# Patient Record
Sex: FEMALE
[De-identification: names, ages, dates, MRNs, and addresses within clinical notes are randomized per-mention and may not be internally consistent; named-entity substitution may affect disease eponyms.]

---

## 2019-04-02 ENCOUNTER — HOSPITAL ENCOUNTER (OUTPATIENT)
Dept: HOSPITAL 92 - BICMRI | Age: 69
Discharge: HOME | End: 2019-04-02
Attending: ANESTHESIOLOGY
Payer: MEDICARE

## 2019-04-02 DIAGNOSIS — M48.02: ICD-10-CM

## 2019-04-02 DIAGNOSIS — M54.12: Primary | ICD-10-CM

## 2019-04-02 PROCEDURE — 72141 MRI NECK SPINE W/O DYE: CPT

## 2019-04-02 NOTE — MRI
FExam: MRI cervical spine without contrast



HISTORY: Cervical radicular pain. Intermittent neck pain x2-3 years..



COMPARISON: None



FINDINGS:

 Limited evaluation due to motion dictation multiple sequences

Appropriate T1 marrow signal intensity of the cervical vertebra. Vertebral body height is maintained.
 No fracture.

No significant STIR hyperintensity to suggest vertebral body edema or ligamentous injury

Visualized brain parenchyma, cervical junction, cervical cord and upper thoracic cord have a normal s
ize and signal intensity





C2-C3: No significant central canal stenosis. Moderate right and moderate-to-severe left foraminal na
rrowing.



C2-C4: No significant central canal stenosis. Mild right and moderate to severe left foraminal narrow
ing.



C4-C5: No significant central canal stenosis. Moderate right and moderate to severe left foraminal na
rrowing. There is left facet hypertrophy.



C5-C6: No high-grade central canal stenosis. Moderate bilateral foraminal narrowing. Mild to moderate
 left facet hypertrophy.



C6-C7: No high-grade central canal stenosis. Mild bilateral foraminal narrowing.



C7-T1: Suboptimal evaluation. Based on sagittal images, no high-grade stenosis



IMPRESSION:

 Limited evaluation due to motion examination. No high-grade central canal stenosis. Varying degrees 
of foraminal narrowing as detailed above.



Reported By: Wendy Street 

Electronically Signed:  4/2/2019 10:00 AM

## 2019-06-27 ENCOUNTER — HOSPITAL ENCOUNTER (INPATIENT)
Dept: HOSPITAL 92 - ERS | Age: 69
LOS: 6 days | Discharge: HOME HEALTH | DRG: 377 | End: 2019-07-03
Attending: FAMILY MEDICINE | Admitting: FAMILY MEDICINE
Payer: MEDICARE

## 2019-06-27 VITALS — BODY MASS INDEX: 43.4 KG/M2

## 2019-06-27 DIAGNOSIS — K57.10: ICD-10-CM

## 2019-06-27 DIAGNOSIS — J45.901: ICD-10-CM

## 2019-06-27 DIAGNOSIS — J44.9: ICD-10-CM

## 2019-06-27 DIAGNOSIS — D63.1: ICD-10-CM

## 2019-06-27 DIAGNOSIS — I13.0: ICD-10-CM

## 2019-06-27 DIAGNOSIS — K26.4: Primary | ICD-10-CM

## 2019-06-27 DIAGNOSIS — Z79.51: ICD-10-CM

## 2019-06-27 DIAGNOSIS — F32.9: ICD-10-CM

## 2019-06-27 DIAGNOSIS — F03.90: ICD-10-CM

## 2019-06-27 DIAGNOSIS — I50.33: ICD-10-CM

## 2019-06-27 DIAGNOSIS — Z96.652: ICD-10-CM

## 2019-06-27 DIAGNOSIS — N18.3: ICD-10-CM

## 2019-06-27 DIAGNOSIS — E11.22: ICD-10-CM

## 2019-06-27 DIAGNOSIS — E78.5: ICD-10-CM

## 2019-06-27 DIAGNOSIS — E03.9: ICD-10-CM

## 2019-06-27 DIAGNOSIS — K63.5: ICD-10-CM

## 2019-06-27 DIAGNOSIS — M54.12: ICD-10-CM

## 2019-06-27 DIAGNOSIS — G47.33: ICD-10-CM

## 2019-06-27 DIAGNOSIS — N17.9: ICD-10-CM

## 2019-06-27 DIAGNOSIS — Z79.84: ICD-10-CM

## 2019-06-27 DIAGNOSIS — E66.01: ICD-10-CM

## 2019-06-27 DIAGNOSIS — Z79.899: ICD-10-CM

## 2019-06-27 LAB
ALBUMIN SERPL BCG-MCNC: 3.3 G/DL (ref 3.4–4.8)
ALP SERPL-CCNC: 272 U/L (ref 40–150)
ALT SERPL W P-5'-P-CCNC: 20 U/L (ref 8–55)
ANALYZER IN CARDIO: (no result)
ANION GAP SERPL CALC-SCNC: 15 MMOL/L (ref 10–20)
AST SERPL-CCNC: 22 U/L (ref 5–34)
BASE EXCESS STD BLDA CALC-SCNC: 3.1 MEQ/L
BASOPHILS # BLD AUTO: 0 THOU/UL (ref 0–0.2)
BASOPHILS NFR BLD AUTO: 0.1 % (ref 0–1)
BILIRUB SERPL-MCNC: 0.4 MG/DL (ref 0.2–1.2)
BUN SERPL-MCNC: 52 MG/DL (ref 9.8–20.1)
CA-I BLDA-SCNC: 1.19 MMOL/L (ref 1.12–1.3)
CALCIUM SERPL-MCNC: 9.3 MG/DL (ref 7.8–10.44)
CHLORIDE SERPL-SCNC: 101 MMOL/L (ref 98–107)
CK MB SERPL-MCNC: 3.5 NG/ML (ref 0–6.6)
CK SERPL-CCNC: 103 U/L (ref 29–168)
CO2 SERPL-SCNC: 28 MMOL/L (ref 23–31)
CREAT CL PREDICTED SERPL C-G-VRATE: 0 ML/MIN (ref 70–130)
EOSINOPHIL # BLD AUTO: 0.1 THOU/UL (ref 0–0.7)
EOSINOPHIL NFR BLD AUTO: 1 % (ref 0–10)
GLOBULIN SER CALC-MCNC: 3.4 G/DL (ref 2.4–3.5)
GLUCOSE SERPL-MCNC: 81 MG/DL (ref 80–115)
HCO3 BLDA-SCNC: 28.2 MEQ/L (ref 22–28)
HCT VFR BLDA CALC: 24 % (ref 36–47)
HGB BLD-MCNC: 7.6 G/DL (ref 12–16)
HGB BLD-MCNC: 7.6 G/DL (ref 12–16)
HGB BLDA-MCNC: 8.1 G/DL (ref 12–16)
IRON SERPL-MCNC: 78 UG/DL (ref 50–170)
LIPASE SERPL-CCNC: 37 U/L (ref 8–78)
LYMPHOCYTES # BLD: 1.8 THOU/UL (ref 1.2–3.4)
LYMPHOCYTES NFR BLD AUTO: 14 % (ref 21–51)
MCH RBC QN AUTO: 32.1 PG (ref 27–31)
MCV RBC AUTO: 95.4 FL (ref 78–98)
MONOCYTES # BLD AUTO: 0.6 THOU/UL (ref 0.11–0.59)
MONOCYTES NFR BLD AUTO: 4.5 % (ref 0–10)
NEUTROPHILS # BLD AUTO: 10.2 THOU/UL (ref 1.4–6.5)
NEUTROPHILS NFR BLD AUTO: 80.4 % (ref 42–75)
PCO2 BLDA: 45.8 MMHG (ref 35–45)
PH BLDA: 7.41 [PH] (ref 7.35–7.45)
PLATELET # BLD AUTO: 237 THOU/UL (ref 130–400)
PO2 BLDA: 65.7 MMHG (ref 80–?)
POTASSIUM BLD-SCNC: 4.04 MMOL/L (ref 3.7–5.3)
POTASSIUM SERPL-SCNC: 4 MMOL/L (ref 3.5–5.1)
RBC # BLD AUTO: 2.37 MILL/UL (ref 4.2–5.4)
RETICS/RBC NFR: 3.8 % (ref 0.5–1.5)
SODIUM SERPL-SCNC: 140 MMOL/L (ref 136–145)
SPECIMEN DRAWN FROM PATIENT: (no result)
TROPONIN I SERPL DL<=0.01 NG/ML-MCNC: (no result) NG/ML (ref ?–0.03)
TROPONIN I SERPL DL<=0.01 NG/ML-MCNC: (no result) NG/ML (ref ?–0.03)
UIBC SERPL-MCNC: 293 MCG/DL (ref 265–497)
VIT B12 SERPL-MCNC: 745 PG/ML (ref 211–911)
WBC # BLD AUTO: 12.7 THOU/UL (ref 4.8–10.8)

## 2019-06-27 PROCEDURE — 85014 HEMATOCRIT: CPT

## 2019-06-27 PROCEDURE — P9016 RBC LEUKOCYTES REDUCED: HCPCS

## 2019-06-27 PROCEDURE — 85046 RETICYTE/HGB CONCENTRATE: CPT

## 2019-06-27 PROCEDURE — 83550 IRON BINDING TEST: CPT

## 2019-06-27 PROCEDURE — 84484 ASSAY OF TROPONIN QUANT: CPT

## 2019-06-27 PROCEDURE — 93306 TTE W/DOPPLER COMPLETE: CPT

## 2019-06-27 PROCEDURE — 71045 X-RAY EXAM CHEST 1 VIEW: CPT

## 2019-06-27 PROCEDURE — 83540 ASSAY OF IRON: CPT

## 2019-06-27 PROCEDURE — 82274 ASSAY TEST FOR BLOOD FECAL: CPT

## 2019-06-27 PROCEDURE — 86850 RBC ANTIBODY SCREEN: CPT

## 2019-06-27 PROCEDURE — 80069 RENAL FUNCTION PANEL: CPT

## 2019-06-27 PROCEDURE — C9113 INJ PANTOPRAZOLE SODIUM, VIA: HCPCS

## 2019-06-27 PROCEDURE — 82553 CREATINE MB FRACTION: CPT

## 2019-06-27 PROCEDURE — 36430 TRANSFUSION BLD/BLD COMPNT: CPT

## 2019-06-27 PROCEDURE — 82746 ASSAY OF FOLIC ACID SERUM: CPT

## 2019-06-27 PROCEDURE — 83036 HEMOGLOBIN GLYCOSYLATED A1C: CPT

## 2019-06-27 PROCEDURE — 96361 HYDRATE IV INFUSION ADD-ON: CPT

## 2019-06-27 PROCEDURE — 86900 BLOOD TYPING SEROLOGIC ABO: CPT

## 2019-06-27 PROCEDURE — 94640 AIRWAY INHALATION TREATMENT: CPT

## 2019-06-27 PROCEDURE — 30233N1 TRANSFUSION OF NONAUTOLOGOUS RED BLOOD CELLS INTO PERIPHERAL VEIN, PERCUTANEOUS APPROACH: ICD-10-PCS | Performed by: INTERNAL MEDICINE

## 2019-06-27 PROCEDURE — 80048 BASIC METABOLIC PNL TOTAL CA: CPT

## 2019-06-27 PROCEDURE — 82550 ASSAY OF CK (CPK): CPT

## 2019-06-27 PROCEDURE — 83880 ASSAY OF NATRIURETIC PEPTIDE: CPT

## 2019-06-27 PROCEDURE — 96374 THER/PROPH/DIAG INJ IV PUSH: CPT

## 2019-06-27 PROCEDURE — 93005 ELECTROCARDIOGRAM TRACING: CPT

## 2019-06-27 PROCEDURE — 96375 TX/PRO/DX INJ NEW DRUG ADDON: CPT

## 2019-06-27 PROCEDURE — 82728 ASSAY OF FERRITIN: CPT

## 2019-06-27 PROCEDURE — 85018 HEMOGLOBIN: CPT

## 2019-06-27 PROCEDURE — 36415 COLL VENOUS BLD VENIPUNCTURE: CPT

## 2019-06-27 PROCEDURE — 36416 COLLJ CAPILLARY BLOOD SPEC: CPT

## 2019-06-27 PROCEDURE — 82607 VITAMIN B-12: CPT

## 2019-06-27 PROCEDURE — 83690 ASSAY OF LIPASE: CPT

## 2019-06-27 PROCEDURE — 86901 BLOOD TYPING SEROLOGIC RH(D): CPT

## 2019-06-27 PROCEDURE — 88305 TISSUE EXAM BY PATHOLOGIST: CPT

## 2019-06-27 PROCEDURE — 80053 COMPREHEN METABOLIC PANEL: CPT

## 2019-06-27 PROCEDURE — 82805 BLOOD GASES W/O2 SATURATION: CPT

## 2019-06-27 PROCEDURE — 85025 COMPLETE CBC W/AUTO DIFF WBC: CPT

## 2019-06-27 NOTE — RAD
EXAM: Single view of the chest



HISTORY:   Altered mental status



COMPARISON: 9/23/2016



FINDINGS: Single view of the chest shows a normal sized cardiomediastinal silhouette.  Stable opacity
 is seen in the right apex medially from the patient's aortic arch abnormality.  There is no

evidence of consolidation, mass, or pleural effusion. The bones are unremarkable.



IMPRESSION: No evidence of acute cardiopulmonary disease



Reported By: Tam Oliva 

Electronically Signed:  6/27/2019 3:25 PM

## 2019-06-27 NOTE — CON
DATE OF CONSULTATION:  06/27/2019



CHIEF COMPLAINT:  Weakness.



HISTORY OF PRESENT ILLNESS:  Ms. Gomez is a 69-year-old woman who presented to

the emergency room today after her friends found her to be weak and short of breath

this morning.  She has no chest pain.  She has had a pressure type pain in the left

upper quadrant over the last several days.  She has had no visible blood in the

stool, black stools or red stools.  She has had no diarrhea or constipation.  No

change in her appetite or weight.  She was found to have severe anemia in the

emergency room and she has been started on blood transfusion.  GI was consulted to

evaluate her anemia. 



PAST MEDICAL HISTORY:  She has a history of a malignant colon polyp resected in the

distant past.  Her last colonoscopy was in 2015 by Dr. Samuel and was normal except

for hemorrhoids.  She has diabetes mellitus and hypertension, chronic kidney

disease, dementia, hyperlipidemia, hypothyroidism, obesity. 



PAST SURGICAL HISTORY:  Left total knee replacement, multiple endoscopies.



FAMILY HISTORY:  Positive for colon cancer in her mother.



SOCIAL HISTORY:  No alcohol, tobacco, or drugs currently.



ALLERGIES:  NO KNOWN DRUG ALLERGIES.



MEDICATIONS:  At home prior to admission:

1. Atorvastatin.

2. Namenda.

3. Calcitriol.

4. Donepezil. 

5. Gabapentin.

6. Amitriptyline.

7. Levothyroxine.

8. Glipizide.

9. Tramadol.

10. Lisinopril.

11. Ventolin inhaler.

12. Acetaminophen with codeine.



REVIEW OF SYSTEMS:  Negative x10 systems reviewed except as stated in the history of

present illness. 



PHYSICAL EXAMINATION:

VITAL SIGNS:  Temperature 98.1, pulse 88, blood pressure 156/76. 

GENERAL:  She is in no acute distress.  Awake and alert.   

HEENT:  Eyes have no scleral icterus.  Oropharynx is clear without lesions.  No

cervical or supraclavicular lymphadenopathy. LUNGS:  Clear to auscultation

bilaterally. 

HEART:  Regular rate and rhythm without murmur. 

ABDOMEN:  Soft, nontender, and nondistended.  Bowel sounds are present.  No

hepatomegaly. EXTREMITIES:  No lower extremity edema. 

NEUROLOGIC:  Cranial nerves are grossly intact.  She had a Hemoccult in the

emergency room, which was negative for occult blood. 



IMPRESSION:  

1. Normocytic anemia.  Her hemoglobin is 7.6 with an MCV of 95.  This is a

significant drop from 04/16/2019 when her hemoglobin was 11.5.  She has had no overt

gastrointestinal bleeding associated with this and she is Hemoccult negative.

Source of the anemia is not determined at this point. 

2. History of colon cancer status post removal of malignant polyp.  Last colonoscopy

in 2015 was normal except for internal hemorrhoids. 

3. Left upper quadrant pressure type abdominal pain.  This appears to be mild

overall. 



RECOMMENDATIONS:  

1. Check iron studies, B12 and folate.  Check reticulocyte count.

2. We will follow through with the EGD and colonoscopy for tomorrow.

3. She is receiving transfusion today.

4. Continue to follow the trend of her creatinine and hemoglobin.







Job ID:  [id number]

## 2019-06-27 NOTE — HP
REASON FOR ADMISSION:  GI bleed, asthma exacerbation, and acute kidney injury.



HISTORY OF PRESENTING ILLNESS:  The patient gives history of feeling short of 
breath

and feeling weak from this morning.  She called her friend and neighbor who

essentially brought her to emergency room in a car.  She lives at [facility] from last 5 years.  She says she ambulates with a rolling walker.  No

obvious bleeding from her rectum that she has noticed.  No black stools.  No 
history

of hematemesis.  She says she fell 2 weeks back and hit her right knee with

bruising.  The patient also mentions that she has a followup appointment for

colonoscopy on  with Dr. Alba Samuel.  She has no complaints of chest 
pain or

palpitation.  No complaints of altered phlegm, but has some dry coughing spells 
due

to asthma.  She uses 2L oxygen at bedtime.  She also uses nebulizers twice 
daily.

Has no complaints of palpitations or fever. 



PAST MEDICAL AND SURGICAL HISTORY:  

1. History of dementia.

2. Chronic kidney disease.

3. Diabetes mellitus, type 2.

4. Dyslipidemia.

5. Hypertension.

6. Hypothyroidism.

7. Cervical radiculopathy and has a followup appointment for an MRI on  at

[facility]. 

8. Left total knee replacement.

9. Depression.

10. Obesity.



CURRENT MEDICATIONS:  The patient is on;

1. Atorvastatin 40 mg p.o. daily.

2. Namenda extended release 14 mg p.o. daily.

3. Calcitriol 0.5 mg p.o. daily.

4. Donepezil 5 mg p.o. nightly.

5. Gabapentin 300 mg p.o. twice daily.

6. Amitriptyline 25 mg p.o. daily.

7. Levothyroxine 100 mcg p.o. daily.

8. Glipizide 5 mg p.o. daily.

9. Tramadol 50 mg p.o. twice daily.

10. Lisinopril 2.5 mg p.o. daily.

11. Ventolin inhaler p.r.n.

12. Ventolin nebulizer twice daily.

13. The patient mentions that she forgets to take her aspirin, which she is not 
sure

when she last took. 



ALLERGIES:  NO KNOWN DRUG ALLERGIES.  SHE GETS A RASH WITH TAPE.



PERSONAL HISTORY:  Does not abuse alcohol or drugs.  No history of smoking.



FAMILY HISTORY:  Both parents  in their 80s from unknown cancer.  She cannot

recollect the exact nature of death they had. 



CODE STATUS:  Full.  Power of  is her son, Mr. Salty Gomez.
  She

normally ambulates with a walker for short distances.  She lives at [facility] from last 5 years. 



REVIEW OF SYSTEMS:  CONSTITUTIONAL:  Negative for weight loss or gain, ability 
to

conduct usual activities. 

SKIN:  Negative for rash, itching. 

EYES:  Negative for double vision, pain. 

ENT/MOUTH:  Negative for nose bleeding, neck stiffness, pain, tenderness. 

CARDIOVASCULAR:  Negative for palpitations, dyspnea on exertion, orthopnea. 

RESPIRATORY:  Negative for shortness of breath, wheezing, cough, hemoptysis, 
fever

or night sweats. 

GASTROINTESTINAL:  Negative for poor appetite, abdominal pain, heartburn, nausea
,

vomiting, constipation, or diarrhea. 

GENITOURINARY:  Negative for urgency, frequency, dysuria, nocturia. 

MUSCULOSKELETAL:  Negative for pain, swelling. 

NEUROLOGIC/PSYCHIATRIC:  Negative for anxiety, depression. 

ALLERGY/IMMUNOLOGIC:  Negative for skin rash, bleeding tendency.



PHYSICAL EXAMINATION:

GENERAL:  The patient is a 69-year-old female, who is currently not in any acute

distress. 

VITAL SIGNS:  Blood pressure 156/76, pulse 88 per minute, respiratory rate 22 
per

minute, temperature 98.1 degrees Fahrenheit, saturating 96% on 2L nasal 
cannula. 

NECK:  Supple.  No elevated JVP. 

EYES:  Extraocular muscles intact.  Pupils reacting to light. 

ORAL CAVITY:  Mucous membranes are dry.  No exudates or congestion. 

CARDIOVASCULAR SYSTEM:  S1 and S2 heard.  Regular rhythm. 

RESPIRATORY SYSTEM:  Air entry 1+ bilateral.  The breath sounds are distant.

Scattered rhonchi plus bilateral.  No rales. 

ABDOMEN:  Soft.  Bowel sounds heard.  No tenderness, rigidity, or guarding. 

EXTREMITIES:  No peripheral edema.  There is bruising seen over the right knee 
and

left shin area.  These are small bruises.  No edema or deformity seen in the

extremities.  Peripheral pulses are 1+ bilateral.  No ischemic ulcerations or

gangrene. 

CENTRAL NERVOUS SYSTEM:  No gross focal deficits noted.  The patient is alert,

awake, and responds well to verbal questions. 

PSYCHIATRIC:  The patient's mood is euthymic.  No hallucinations or delusions.



LABORATORY DATA:  EKG done shows normal sinus rhythm at 82 beats per minute.  
There

is LBBB seen.  QRS duration is 166 milliseconds, corrected QT is 483 
milliseconds.

White count of 12, hemoglobin and hematocrit 7.6 and 22.6, MCV is 95 with 
platelet

count of 237, and 80% neutrophils.  Blood gas done in the ER shows a pH of 7.41,

pCO2 of 45, pO2 of 65, bicarb is 28 on the blood gas.  Serum bicarb is 28, BUN 
52,

creatinine 2.4, and potassium is 4.0.  Liver enzymes; AST and ALT within normal

limits, alkaline phosphatase is 272, and total bilirubin 0.4.  Troponin-I 0.029,

CK-MB 3.5, and BNP is 202.  Albumin is 3.3.  Lipase is 37.  Chest x-ray done 
shows

no evidence of acute cardiopulmonary disease. 



CLINICAL IMPRESSION AND PLAN:  The patient will be admitted to telemetry for

gastrointestinal bleed with acute kidney injury.  The patient has mild asthma

exacerbation.  We will obtain hemoglobin and hematocrit q.6 h.  The ER physician

has ordered a unit of packed cells.  She appears hemodynamically stable at 
present.

She will be on DuoNeb q.6 h., Protonix drip.  We will obtain GI consultation 
with

Dr. Alex Shaw, who is on call.  Her prior colonoscopy done on 10/27/2015 by 
Dr. Alba Samuel showed small internal hemorrhoids, otherwise was normal.  She has

known history of malignant polyp and strong family history of colon cancer.  We 
will

continue her Lipitor, Namenda extended release, donepezil, gabapentin,

amitriptyline, levothyroxine, and lisinopril for now.  The patient will be on 
clear

liquid diet until GI clears her.  We will 

obtain an echo with 2D Doppler for left ventricular function as well.  The 
patient

says she has never had a stress test in the past, but has seen Dr. Van.  
She

will be gently hydrated with normal saline at 50 mL per hour until she is on a 
clear

liquid diet.  Code status is full. 







Job ID:  [id number]



[facility]

## 2019-06-28 LAB
ANION GAP SERPL CALC-SCNC: 17 MMOL/L (ref 10–20)
BUN SERPL-MCNC: 40 MG/DL (ref 9.8–20.1)
CALCIUM SERPL-MCNC: 8.6 MG/DL (ref 7.8–10.44)
CHLORIDE SERPL-SCNC: 103 MMOL/L (ref 98–107)
CO2 SERPL-SCNC: 25 MMOL/L (ref 23–31)
CREAT CL PREDICTED SERPL C-G-VRATE: 53 ML/MIN (ref 70–130)
GLUCOSE SERPL-MCNC: 183 MG/DL (ref 80–115)
HGB BLD-MCNC: 8.3 G/DL (ref 12–16)
HGB BLD-MCNC: 8.7 G/DL (ref 12–16)
HGB BLD-MCNC: 9.1 G/DL (ref 12–16)
POTASSIUM SERPL-SCNC: 4.5 MMOL/L (ref 3.5–5.1)
SODIUM SERPL-SCNC: 140 MMOL/L (ref 136–145)

## 2019-06-28 PROCEDURE — 0W3P8ZZ CONTROL BLEEDING IN GASTROINTESTINAL TRACT, VIA NATURAL OR ARTIFICIAL OPENING ENDOSCOPIC: ICD-10-PCS | Performed by: INTERNAL MEDICINE

## 2019-06-28 PROCEDURE — 0DBN8ZZ EXCISION OF SIGMOID COLON, VIA NATURAL OR ARTIFICIAL OPENING ENDOSCOPIC: ICD-10-PCS | Performed by: INTERNAL MEDICINE

## 2019-06-28 RX ADMIN — Medication SCH ML: at 21:52

## 2019-06-28 RX ADMIN — Medication SCH ML: at 08:57

## 2019-06-28 NOTE — PDOC.PN
- Subjective


Encounter Start Date: 06/28/19


Encounter Start Time: 10:50


Subjective: Admitted with SOB and weakness and was found to have anemia.


-: Feeling better. Awaiting endoscopic evaluation.





- Objective


Resuscitation Status - Order Detail:





06/27/19 17:43


Resuscitation Status Routine 


   Resuscitation Status: FULL: Full Resuscitation








Vital Signs & Weight: 


 Vital Signs (12 hours)











  Temp Pulse Resp BP Pulse Ox


 


 06/28/19 07:44   53 L  16  


 


 06/28/19 07:13  98.0 F  65  18  129/58 L  99


 


 06/28/19 03:04  98.5 F  59 L  18  139/76  98


 


 06/28/19 00:01      98


 


 06/28/19 00:00      98


 


 06/27/19 23:35  96.7 F L  63  20  161/77 H  97








 Weight











Weight                         267 lb














Result Diagrams: 


 06/28/19 06:09





 06/28/19 06:09


Additional Labs: 


 Accuchecks











  06/28/19 06/27/19 06/27/19





  05:33 22:02 18:10


 


POC Glucose  196 H  245 H  111 H














Phys Exam





- Physical Examination


Constitutional: NAD


morbidly obese


HEENT: moist MMs


Neck: supple


Respiratory: no wheezing, no rhonchi


fair air entry bilaterally with some transmitted sound


Cardiovascular: RRR


systolic murmur noted


Gastrointestinal: soft, non-tender, no distention, positive bowel sounds


obese


Musculoskeletal: no edema, pulses present


Neurological: non-focal, moves all 4 limbs


Hard of hearing.


Psychiatric: normal affect, A&O x 3





Dx/Plan


(1) Acute anemia


Code(s): D64.9 - ANEMIA, UNSPECIFIED   Status: Acute   





(2) Morbid obesity with BMI of 40.0-44.9, adult


Code(s): E66.01 - MORBID (SEVERE) OBESITY DUE TO EXCESS CALORIES; Z68.41 - BODY 

MASS INDEX (BMI) 40.0-44.9, ADULT   Status: Acute   





(3) COPD (chronic obstructive pulmonary disease)


Status: Acute   





(4) AARON (acute kidney injury)


Code(s): N17.9 - ACUTE KIDNEY FAILURE, UNSPECIFIED   Status: Acute   





(5) CKD (chronic kidney disease) stage 3, GFR 30-59 ml/min


Code(s): N18.3 - CHRONIC KIDNEY DISEASE, STAGE 3 (MODERATE)   Status: Acute   





(6) Diabetes mellitus


Code(s): E11.9 - TYPE 2 DIABETES MELLITUS WITHOUT COMPLICATIONS   Status: Acute

   





(7) HTN (hypertension)


Code(s): I10 - ESSENTIAL (PRIMARY) HYPERTENSION   Status: Acute   





- Plan


Continue PPI.


-: Follow H/H and transfuse as needed


-: Continue cautious IVF and monitor renal function.


-: Continue bronchodilators


-: Start insulin therapy. get hba1c in the am.





* .

## 2019-06-28 NOTE — OP
DATE OF PROCEDURE:  06/28/2019



PROCEDURES PERFORMED:  Esophagogastroduodenoscopy with control of hemorrhage and

colonoscopy with snare polypectomy. 



PREOPERATIVE DIAGNOSES:  Anemia, left upper quadrant abdominal pain, and history of

malignant colon polyp. 



DESCRIPTION OF PROCEDURE:  Informed consent was obtained from the patient.  She was

sedated with total intravenous anesthesia.  The bite block was placed and the

endoscope was advanced easily to the second portion of the duodenum and retroflexion

was performed in the stomach.  The esophagus was normal.  The GE junction was

normal.  The stomach was normal including retroflexed views.  The pylorus and first

portions of the duodenum were normal.  There was a small 4 mm erosion in the second

portion of the duodenum with a bloody central spot or AVM.  This was cauterized with

argon plasma coagulation.  The blood would be washed clear and then reaccumulate

prior to cautery and therefore, this lesion was treated.  Still, there was no

staining of old blood or significant amount of bleeding around this site and I doubt

this was a significant bleeding source.  There was a moderately large diverticulum

in the second portion of the duodenum.  The remainder of the duodenum was normal.

The patient was turned around.  Rectal exam was performed and was normal.  The

colonoscope was advanced to the terminal ileum with some difficulty due to ventral

hernia near the umbilicus that had to be reduced to advance the scope.  The mucosa

of the terminal ileum was normal.  The ileocecal valve was clearly identified.  The

mucosa could not be visualized due to retained fibrous vegetable matter in that

area.  The colonic mucosa was otherwise normal.  There was a 4 mm polyp removed from

the sigmoid colon by cold snare polypectomy.  The remainder of the colonic mucosa

was normal.  Retroflexed views in the rectum were normal. 



IMPRESSION:  

1. Small very lightly bleeding erosion in the second portion of the duodenum,

measuring around 4 mm, which was cauterized with good hemostasis confirmed.  I doubt

this is a major bleeding source. 

2. Duodenal diverticulum.

3. Otherwise normal EGD.

4. A 4 mm polyp was removed by cold snare polypectomy from the sigmoid colon.

5. Ventral/umbilical hernia containing colon, which had to be reduced to advance the

scope around the colon. 

6. Otherwise normal colonoscopy to the terminal ileum.  The cecum could not be

visualized due to a large amount of retained vegetable matter in this area.

However, the prep was otherwise good throughout the rest of the colon. 



RECOMMENDATIONS:  

1. Await histopathology.

2. Consider Hematology evaluation if the anemia persists.

3. I will sign off.  Please call if GI can help.







Job ID:  [id number]

## 2019-06-29 LAB
ALBUMIN SERPL BCG-MCNC: 3 G/DL (ref 3.4–4.8)
ANION GAP SERPL CALC-SCNC: 13 MMOL/L (ref 10–20)
BASOPHILS # BLD AUTO: 0 THOU/UL (ref 0–0.2)
BASOPHILS NFR BLD AUTO: 0.1 % (ref 0–1)
BUN SERPL-MCNC: 34 MG/DL (ref 9.8–20.1)
BUN/CREAT SERPL: 18.09
CALCIUM SERPL-MCNC: 8 MG/DL (ref 7.8–10.44)
CHLORIDE SERPL-SCNC: 103 MMOL/L (ref 98–107)
CO2 SERPL-SCNC: 27 MMOL/L (ref 23–31)
CREAT CL PREDICTED SERPL C-G-VRATE: 56 ML/MIN (ref 70–130)
EOSINOPHIL # BLD AUTO: 0.1 THOU/UL (ref 0–0.7)
EOSINOPHIL NFR BLD AUTO: 0.4 % (ref 0–10)
GLUCOSE SERPL-MCNC: 110 MG/DL (ref 80–115)
HGB BLD-MCNC: 8 G/DL (ref 12–16)
LYMPHOCYTES # BLD: 1.8 THOU/UL (ref 1.2–3.4)
LYMPHOCYTES NFR BLD AUTO: 13.7 % (ref 21–51)
MCH RBC QN AUTO: 30.7 PG (ref 27–31)
MCV RBC AUTO: 93.6 FL (ref 78–98)
MONOCYTES # BLD AUTO: 0.7 THOU/UL (ref 0.11–0.59)
MONOCYTES NFR BLD AUTO: 4.9 % (ref 0–10)
NEUTROPHILS # BLD AUTO: 10.8 THOU/UL (ref 1.4–6.5)
NEUTROPHILS NFR BLD AUTO: 80.9 % (ref 42–75)
PLATELET # BLD AUTO: 187 THOU/UL (ref 130–400)
POTASSIUM SERPL-SCNC: 3.7 MMOL/L (ref 3.5–5.1)
RBC # BLD AUTO: 2.6 MILL/UL (ref 4.2–5.4)
SODIUM SERPL-SCNC: 139 MMOL/L (ref 136–145)
WBC # BLD AUTO: 13.3 THOU/UL (ref 4.8–10.8)

## 2019-06-29 RX ADMIN — Medication SCH ML: at 08:41

## 2019-06-29 RX ADMIN — INSULIN LISPRO PRN UNIT: 100 INJECTION, SOLUTION INTRAVENOUS; SUBCUTANEOUS at 11:42

## 2019-06-29 NOTE — PDOC.PN
- Subjective


Encounter Start Date: 06/29/19


Encounter Start Time: 10:33


Subjective: Feeling better. Denied N/V/D. tolerating oral intake well.





- Objective


Resuscitation Status - Order Detail:





06/27/19 17:43


Resuscitation Status Routine 


   Resuscitation Status: FULL: Full Resuscitation








Vital Signs & Weight: 


 Vital Signs (12 hours)











  Temp Pulse Resp BP Pulse Ox


 


 06/29/19 08:00   68  16   99


 


 06/29/19 07:51  98.3 F  69  18  156/92 H  100


 


 06/29/19 04:08  98.1 F  68  18  145/67 H  100


 


 06/29/19 00:01  97.7 F  73  18  102/66  100








 Weight











Weight                         272 lb














I&O: 


 











 06/28/19 06/29/19 06/30/19





 06:59 06:59 06:59


 


Intake Total  1770 


 


Output Total  970 


 


Balance  800 











Result Diagrams: 


 06/29/19 05:00





 06/29/19 05:00


Additional Labs: 


 Accuchecks











  06/29/19 06/28/19 06/28/19





  05:32 20:13 16:54


 


POC Glucose  109  127 H  90














  06/28/19





  11:14


 


POC Glucose  151 H














Phys Exam





- Physical Examination


Constitutional: NAD


morbidly obese


HEENT: moist MMs


Neck: supple


Respiratory: no rales, no rhonchi


fair air entry bilatereally


Cardiovascular: RRR, no significant murmur


Gastrointestinal: soft, non-tender, no distention, positive bowel sounds


obese


Musculoskeletal: no edema, pulses present


Neurological: non-focal, moves all 4 limbs


Psychiatric: A&O x 3





Dx/Plan


(1) Acute anemia


Code(s): D64.9 - ANEMIA, UNSPECIFIED   Status: Acute   Comment: etiology 

unclear. GI bleed +/- anemia of CKD. EGD showed duodenal erosion and 

colonoscopy was unremarkabale. Iron chemistry was normal.   





(2) Morbid obesity with BMI of 40.0-44.9, adult


Code(s): E66.01 - MORBID (SEVERE) OBESITY DUE TO EXCESS CALORIES; Z68.41 - BODY 

MASS INDEX (BMI) 40.0-44.9, ADULT   Status: Acute   





(3) COPD (chronic obstructive pulmonary disease)


Status: Acute   





(4) AARON (acute kidney injury)


Code(s): N17.9 - ACUTE KIDNEY FAILURE, UNSPECIFIED   Status: Acute   Comment: 

Resolved.   





(5) CKD (chronic kidney disease) stage 3, GFR 30-59 ml/min


Code(s): N18.3 - CHRONIC KIDNEY DISEASE, STAGE 3 (MODERATE)   Status: Acute   





(6) Diabetes mellitus


Code(s): E11.9 - TYPE 2 DIABETES MELLITUS WITHOUT COMPLICATIONS   Status: Acute

   





(7) HTN (hypertension)


Code(s): I10 - ESSENTIAL (PRIMARY) HYPERTENSION   Status: Acute   





(8) Chronic diastolic (congestive) heart failure


Code(s): I50.32 - CHRONIC DIASTOLIC (CONGESTIVE) HEART FAILURE   Status: Acute 

  





- Plan


Transityion protonix infusion to oral


-: Increase activity.


-: Advance diet.


-: Continue other treatments


-: repeat CBC and BMP in the am.





* .

## 2019-06-30 LAB
ANION GAP SERPL CALC-SCNC: 13 MMOL/L (ref 10–20)
BASOPHILS # BLD AUTO: 0 THOU/UL (ref 0–0.2)
BASOPHILS NFR BLD AUTO: 0.3 % (ref 0–1)
BUN SERPL-MCNC: 30 MG/DL (ref 9.8–20.1)
CALCIUM SERPL-MCNC: 8.1 MG/DL (ref 7.8–10.44)
CHLORIDE SERPL-SCNC: 103 MMOL/L (ref 98–107)
CO2 SERPL-SCNC: 28 MMOL/L (ref 23–31)
CREAT CL PREDICTED SERPL C-G-VRATE: 51 ML/MIN (ref 70–130)
EOSINOPHIL # BLD AUTO: 0.4 THOU/UL (ref 0–0.7)
EOSINOPHIL NFR BLD AUTO: 3.1 % (ref 0–10)
GLUCOSE SERPL-MCNC: 105 MG/DL (ref 80–115)
HGB BLD-MCNC: 8.8 G/DL (ref 12–16)
LYMPHOCYTES # BLD: 1.8 THOU/UL (ref 1.2–3.4)
LYMPHOCYTES NFR BLD AUTO: 15 % (ref 21–51)
MCH RBC QN AUTO: 29.8 PG (ref 27–31)
MCV RBC AUTO: 94.7 FL (ref 78–98)
MONOCYTES # BLD AUTO: 0.6 THOU/UL (ref 0.11–0.59)
MONOCYTES NFR BLD AUTO: 5.1 % (ref 0–10)
NEUTROPHILS # BLD AUTO: 9.1 THOU/UL (ref 1.4–6.5)
NEUTROPHILS NFR BLD AUTO: 76.5 % (ref 42–75)
PLATELET # BLD AUTO: 174 THOU/UL (ref 130–400)
POTASSIUM SERPL-SCNC: 3.8 MMOL/L (ref 3.5–5.1)
RBC # BLD AUTO: 2.94 MILL/UL (ref 4.2–5.4)
SODIUM SERPL-SCNC: 140 MMOL/L (ref 136–145)
WBC # BLD AUTO: 11.9 THOU/UL (ref 4.8–10.8)

## 2019-06-30 RX ADMIN — Medication SCH: at 07:34

## 2019-06-30 RX ADMIN — INSULIN LISPRO PRN UNIT: 100 INJECTION, SOLUTION INTRAVENOUS; SUBCUTANEOUS at 12:44

## 2019-06-30 RX ADMIN — Medication SCH ML: at 20:04

## 2019-06-30 RX ADMIN — Medication SCH ML: at 09:15

## 2019-06-30 NOTE — PDOC.PN
- Subjective


Encounter Start Date: 06/30/19


Encounter Start Time: 18:32


Subjective: No new problem.


-: Was to be discharged but was deem unsafe as patient was requiring assistanc


-: Patient preferred to be discharged to her assisted living facility





- Objective


Resuscitation Status - Order Detail:





06/27/19 17:43


Resuscitation Status Routine 


   Resuscitation Status: FULL: Full Resuscitation








Vital Signs & Weight: 


 Vital Signs (12 hours)











  Temp Pulse Resp BP BP Pulse Ox


 


 06/30/19 15:40  98.7 F  84  15  147/79 H   96


 


 06/30/19 13:21   77  16    94 L


 


 06/30/19 12:34  98.4 F  93  16   162/94 H  95


 


 06/30/19 09:11  98.6 F  88  16  140/83   96


 


 06/30/19 08:45   75  16    96


 


 06/30/19 08:00       96








 Weight











Weight                         263 lb 3.2 oz














I&O: 


 











 06/29/19 06/30/19 07/01/19





 06:59 06:59 06:59


 


Intake Total 1770 1305 


 


Output Total 970 1370 


 


Balance 800 -65 











Result Diagrams: 


 06/30/19 07:19





 06/30/19 07:19


Additional Labs: 


 Accuchecks











  06/30/19 06/30/19 06/30/19





  16:24 11:11 05:17


 


POC Glucose  89  216 H  125 H














  06/29/19





  20:41


 


POC Glucose  220 H














Phys Exam





- Physical Examination


Constitutional: NAD


obese


HEENT: moist MMs


Neck: no JVD, supple


Respiratory: no wheezing, no rales, no rhonchi, clear to auscultation bilateral


Cardiovascular: RRR


Gastrointestinal: soft, non-tender, no distention, positive bowel sounds


obese


Musculoskeletal: no edema, pulses present


Neurological: non-focal, moves all 4 limbs


some memory lapses noted


Psychiatric: normal affect, A&O x 3





Dx/Plan


(1) Acute anemia


Code(s): D64.9 - ANEMIA, UNSPECIFIED   Status: Acute   Comment: etiology 

unclear. GI bleed +/- anemia of CKD. EGD showed duodenal erosion and 

colonoscopy was unremarkabale. Iron chemistry was normal.   





(2) Morbid obesity with BMI of 40.0-44.9, adult


Code(s): E66.01 - MORBID (SEVERE) OBESITY DUE TO EXCESS CALORIES; Z68.41 - BODY 

MASS INDEX (BMI) 40.0-44.9, ADULT   Status: Acute   





(3) COPD (chronic obstructive pulmonary disease)


Status: Acute   





(4) AARON (acute kidney injury)


Code(s): N17.9 - ACUTE KIDNEY FAILURE, UNSPECIFIED   Status: Acute   Comment: 

Resolved.   





(5) CKD (chronic kidney disease) stage 3, GFR 30-59 ml/min


Code(s): N18.3 - CHRONIC KIDNEY DISEASE, STAGE 3 (MODERATE)   Status: Acute   





(6) Diabetes mellitus


Code(s): E11.9 - TYPE 2 DIABETES MELLITUS WITHOUT COMPLICATIONS   Status: Acute

   





(7) HTN (hypertension)


Code(s): I10 - ESSENTIAL (PRIMARY) HYPERTENSION   Status: Acute   





(8) Chronic diastolic (congestive) heart failure


Code(s): I50.32 - CHRONIC DIASTOLIC (CONGESTIVE) HEART FAILURE   Status: Acute 

  





- Plan


Continue PPI.


-: Consult rehab screening.


-: Continue PT/OT and other treatments.


-: For discharge once placement is concluded





* .

## 2019-06-30 NOTE — DIS
DATE OF ADMISSION:  06/27/2019



DATE OF DISCHARGE:  06/30/2019



PRIMARY CARE PHYSICIAN:  Taiwo Burns MD



DISCHARGE DIAGNOSES:  

1. Acute symptomatic anemia.

2. Acute kidney injury.

3. Chronic kidney disease stage 3.

4. Type 2 diabetes mellitus.

5. Hypertension.

6. Physical deconditioning.

7. Acute respiratory insufficiency.

8. Possible asthma exacerbation.

9. Possible acute on chronic diastolic heart failure.

10. Morbid obesity.

11. Hypothyroidism.

12. Suspected obstructive sleep apnea.



CONSULT:  Gastroenterology.



PROCEDURES PERFORMED:  

1. Blood transfusion.

2. Esophagogastroduodenoscopy.

3. Colonoscopy.



HOSPITAL COURSE:  A 69-year-old obese female with known history of CKD, asthma, who

was admitted due to worsening weakness as well as shortness of breath.  She reported

a fall about 2 weeks prior to presentation.  There was no history of nausea,

vomiting, melena, hematemesis, or hematochezia.  She, however, admitted to using

naproxen.  She was found to have hemoglobin of 7.6 on presentation, which is acutely

lower than her baseline.  Though occult blood was negative, there was some concern

for GI bleeding given use of naproxen, hence the patient was transfused 1 unit of

packed red blood cells and subsequently was seen by Gastroenterology, who proceeded

with upper and lower endoscopy.  EGD showed small, lightly bleeding erosion in the

second portion of duodenum, measuring around 4 mm, which was cauterized with good

hemostasis.  The patient also was noted to have a duodenal diverticulum.

Colonoscopy was unremarkable except a 4-mm polyp in the sigmoid colon, which was

removed by cold snare polypectomy.  Following blood transfusion, hemoglobin remained

stable and there was no evidence of further bleeding.  However, chemistry was not

consistent with iron deficiency and it was felt that maybe the patient has both GI

bleed and anemia of chronic illness given history of chronic kidney disease stage

3/4.  The patient also received bronchodilator treatment and IV fluids for acute

kidney injury with improvement in creatinine to baseline.  She also was found to

have physical deconditioning and was treated with physical and occupational therapy

with improvement.  She was subsequently discharged home with home PT for further

restorative therapy.  She was advised to abstain from taking NSAIDs including

ibuprofen, celecoxib, and naproxen. 



PHYSICAL EXAMINATION:

VITAL SIGNS:  Temperature 98.6, pulse 88, respiratory rate 16, SpO2 of 96% on room

air, and blood pressure 140/83. 

GENERAL:  Obese, elderly female, in no obvious distress.  Afebrile.  Anicteric.

Acyanotic. 

HEENT:  Normocephalic and atraumatic.  Pupils are reacting to light.  Oral mucosa is

moist. 

CARDIOVASCULAR:  Regular rhythm and rate with normal heart sounds 1 and 2. 

RESPIRATORY:  Fair air entry bilaterally with no obvious crackle or rhonchi or use

of accessory muscles. 

GI:  Obese, soft, nontender, and nondistended with normal bowel sounds. 

EXTREMITIES:  Grossly normal looking, atraumatic with no edema, erythema, or

cyanosis.  Distal pulses are palpable. 

NEUROLOGIC:  Conscious, alert, oriented x3 with appropriate mental status.  Cranial

nerves 2 through 12 are intact.  The patient is ambulant with mildly unsteady gait. 



CONDITION AT DISCHARGE:  Improved.



DISPOSITION:  Home with home health, PT.



FOLLOWUP:  

1. With PCP in 7 days.

2. With GI in 2 weeks.

3. With Nephrology in 2 to 3 weeks.



DISCHARGE MEDICATIONS:  

1. Acetaminophen with codeine 300/60 mg p.o. one tablet b.i.d. p.r.n. for pain.

2. Albuterol HFA two inhaler q.6 p.r.n. for shortness of breath.

3. Amitriptyline 25 mg p.o. daily.

4. Lipitor 40 mg p.o. daily.

5. Calcitriol 0.5 mcg p.o. daily.

6. Gabapentin 300 mg p.o. b.i.d.

7. Glipizide 5 mg p.o. daily.

8. Levothyroxine 100 mcg p.o. daily.

9. Lisinopril 2.5 mg p.o. daily.

10. Memantine 14 mg capsule daily.

11. Protonix 40 mg p.o. daily.

12. Tramadol 50 mg p.o. b.i.d. p.r.n. for pain.



TIME SPENT:  This discharge took more than 38 minutes.







Job ID:  [id number]

## 2019-07-01 LAB
ANION GAP SERPL CALC-SCNC: 13 MMOL/L (ref 10–20)
BASOPHILS # BLD AUTO: 0.1 THOU/UL (ref 0–0.2)
BASOPHILS NFR BLD AUTO: 1.2 % (ref 0–1)
BUN SERPL-MCNC: 24 MG/DL (ref 9.8–20.1)
CALCIUM SERPL-MCNC: 8.2 MG/DL (ref 7.8–10.44)
CHLORIDE SERPL-SCNC: 104 MMOL/L (ref 98–107)
CO2 SERPL-SCNC: 25 MMOL/L (ref 23–31)
CREAT CL PREDICTED SERPL C-G-VRATE: 59 ML/MIN (ref 70–130)
EOSINOPHIL # BLD AUTO: 0.5 THOU/UL (ref 0–0.7)
EOSINOPHIL NFR BLD AUTO: 4.4 % (ref 0–10)
GLUCOSE SERPL-MCNC: 115 MG/DL (ref 80–115)
HGB BLD-MCNC: 8 G/DL (ref 12–16)
LYMPHOCYTES # BLD: 1.4 THOU/UL (ref 1.2–3.4)
LYMPHOCYTES NFR BLD AUTO: 12.9 % (ref 21–51)
MCH RBC QN AUTO: 30.8 PG (ref 27–31)
MCV RBC AUTO: 96.1 FL (ref 78–98)
MONOCYTES # BLD AUTO: 0.4 THOU/UL (ref 0.11–0.59)
MONOCYTES NFR BLD AUTO: 4.1 % (ref 0–10)
NEUTROPHILS # BLD AUTO: 8.1 THOU/UL (ref 1.4–6.5)
NEUTROPHILS NFR BLD AUTO: 77.5 % (ref 42–75)
PLATELET # BLD AUTO: 145 THOU/UL (ref 130–400)
POTASSIUM SERPL-SCNC: 4.1 MMOL/L (ref 3.5–5.1)
RBC # BLD AUTO: 2.58 MILL/UL (ref 4.2–5.4)
SODIUM SERPL-SCNC: 138 MMOL/L (ref 136–145)
WBC # BLD AUTO: 10.5 THOU/UL (ref 4.8–10.8)

## 2019-07-01 RX ADMIN — Medication SCH ML: at 09:28

## 2019-07-01 RX ADMIN — Medication SCH ML: at 21:44

## 2019-07-01 NOTE — PDOC.EVN
Event Note





- Event Note


Event Note: 





DC SUMMARY SAME AS BEFORE, ADDENDUM TO BE MADE





Patient not discharged due to placement issues, patient had arrangements made 

and then was discharged. Change DC date to 7/1/2019, no other changes to DC 

summary.

## 2019-07-02 RX ADMIN — Medication SCH ML: at 09:46

## 2019-07-02 RX ADMIN — Medication SCH ML: at 20:48

## 2019-07-02 NOTE — PDOC.EVN
Event Note





- Event Note


Event Note: 





ADDDENDUM TO DC SUMMARY





Change DC date to 7/2/2019, no other changes in DC summary

## 2019-07-02 NOTE — PDOC.PN
- Subjective


Encounter Start Date: 07/01/19


Encounter Start Time: 08:00





PATIENT SEEN AND EXAMINED YESTERDAY





NOTE ENTERED ONE DAY LATE AS PATIENT PENDING DISCHARGE DUE TO ARRANGEMENT 

ISSUES.





- Objective


Resuscitation Status - Order Detail:





06/27/19 17:43


Resuscitation Status Routine 


   Resuscitation Status: FULL: Full Resuscitation








Vital Signs & Weight: 


 Vital Signs (12 hours)











  Temp Pulse Resp BP Pulse Ox


 


 07/02/19 07:44  97.5 F L  87  18  136/89  95


 


 07/02/19 07:37   90  16  


 


 07/02/19 04:05  98.6 F  93  18  131/76  92 L


 


 07/02/19 00:00   92  16  148/62 H 


 


 07/01/19 20:06      95








 Weight











Weight                         258 lb














I&O: 


 











 07/01/19 07/02/19 07/03/19





 06:59 06:59 06:59


 


Intake Total 1020 1390 


 


Output Total 400 1260 


 


Balance 620 130 











Result Diagrams: 


 07/01/19 05:04





 07/01/19 05:04


Additional Labs: 


 Accuchecks











  07/02/19 07/01/19 07/01/19





  05:39 16:34 11:12


 


POC Glucose  132 H  113 H  177 H














Phys Exam





- Physical Examination


Constitutional: NAD


HEENT: PERRLA, moist MMs, sclera anicteric


Neck: no nodes


Respiratory: no wheezing, no rales


Cardiovascular: RRR, no significant murmur


Gastrointestinal: soft, non-tender





Dx/Plan


(1) Acute anemia


Code(s): D64.9 - ANEMIA, UNSPECIFIED   Status: Acute   Comment: etiology 

unclear. GI bleed +/- anemia of CKD. EGD showed duodenal erosion and 

colonoscopy was unremarkabale. Iron chemistry was normal.   





(2) CKD (chronic kidney disease) stage 3, GFR 30-59 ml/min


Code(s): N18.3 - CHRONIC KIDNEY DISEASE, STAGE 3 (MODERATE)   Status: Acute   





(3) COPD (chronic obstructive pulmonary disease)


Status: Acute   





(4) Chronic diastolic (congestive) heart failure


Code(s): I50.32 - CHRONIC DIASTOLIC (CONGESTIVE) HEART FAILURE   Status: Acute 

  





(5) Diabetes mellitus


Code(s): E11.9 - TYPE 2 DIABETES MELLITUS WITHOUT COMPLICATIONS   Status: Acute

   





(6) HTN (hypertension)


Code(s): I10 - ESSENTIAL (PRIMARY) HYPERTENSION   Status: Acute   





(7) Morbid obesity with BMI of 40.0-44.9, adult


Code(s): E66.01 - MORBID (SEVERE) OBESITY DUE TO EXCESS CALORIES; Z68.41 - BODY 

MASS INDEX (BMI) 40.0-44.9, ADULT   Status: Acute   





- Plan





* Pending discharge


* DC once arrangements made


* cont plan of care otherwise without any changes

## 2019-07-03 VITALS — TEMPERATURE: 98.7 F | DIASTOLIC BLOOD PRESSURE: 61 MMHG | SYSTOLIC BLOOD PRESSURE: 127 MMHG

## 2019-07-03 RX ADMIN — Medication SCH ML: at 08:54

## 2019-07-03 RX ADMIN — INSULIN LISPRO PRN UNIT: 100 INJECTION, SOLUTION INTRAVENOUS; SUBCUTANEOUS at 11:26

## 2019-07-03 NOTE — DIS
DATE OF ADMISSION:  06/27/2019



DATE OF DISCHARGE:  07/03/2019



ADDENDUM:  

The discharge was postponed.  Originally, she was discharged from the hospital on

the June 30, but because of the problem in placement, the discharge was postponed

until today, which is 07/03/2019.  She is going back to [facility].  Please refer to

discharge summary, which was done by Dr. Tiburcio Capps on the June 30.  There is no

change to her discharge medications or any disposition. 







Job ID:  [id number]

## 2019-07-05 ENCOUNTER — HOSPITAL ENCOUNTER (INPATIENT)
Dept: HOSPITAL 92 - ERS | Age: 69
LOS: 6 days | Discharge: NURSING, REHAB OR LONG-TERM CARE | DRG: 683 | End: 2019-07-11
Attending: HOSPITALIST | Admitting: HOSPITALIST
Payer: MEDICARE

## 2019-07-05 VITALS — BODY MASS INDEX: 46.5 KG/M2

## 2019-07-05 DIAGNOSIS — B96.4: ICD-10-CM

## 2019-07-05 DIAGNOSIS — F03.90: ICD-10-CM

## 2019-07-05 DIAGNOSIS — E78.5: ICD-10-CM

## 2019-07-05 DIAGNOSIS — J44.9: ICD-10-CM

## 2019-07-05 DIAGNOSIS — E03.9: ICD-10-CM

## 2019-07-05 DIAGNOSIS — N18.3: ICD-10-CM

## 2019-07-05 DIAGNOSIS — N17.9: Primary | ICD-10-CM

## 2019-07-05 DIAGNOSIS — I50.32: ICD-10-CM

## 2019-07-05 DIAGNOSIS — Z79.899: ICD-10-CM

## 2019-07-05 DIAGNOSIS — N39.0: ICD-10-CM

## 2019-07-05 DIAGNOSIS — E11.22: ICD-10-CM

## 2019-07-05 DIAGNOSIS — I13.0: ICD-10-CM

## 2019-07-05 DIAGNOSIS — F32.9: ICD-10-CM

## 2019-07-05 DIAGNOSIS — Z79.84: ICD-10-CM

## 2019-07-05 DIAGNOSIS — E66.01: ICD-10-CM

## 2019-07-05 LAB
ALBUMIN SERPL BCG-MCNC: 3.6 G/DL (ref 3.4–4.8)
ALP SERPL-CCNC: 269 U/L (ref 40–150)
ALT SERPL W P-5'-P-CCNC: 22 U/L (ref 8–55)
ANION GAP SERPL CALC-SCNC: 17 MMOL/L (ref 10–20)
AST SERPL-CCNC: 27 U/L (ref 5–34)
BACTERIA UR QL AUTO: (no result) HPF
BASOPHILS # BLD AUTO: 0 THOU/UL (ref 0–0.2)
BASOPHILS NFR BLD AUTO: 0.3 % (ref 0–1)
BILIRUB SERPL-MCNC: 0.8 MG/DL (ref 0.2–1.2)
BUN SERPL-MCNC: 29 MG/DL (ref 9.8–20.1)
CALCIUM SERPL-MCNC: 8.5 MG/DL (ref 7.8–10.44)
CHLORIDE SERPL-SCNC: 96 MMOL/L (ref 98–107)
CK MB SERPL-MCNC: 8.9 NG/ML (ref 0–6.6)
CK SERPL-CCNC: 228 U/L (ref 29–168)
CO2 SERPL-SCNC: 26 MMOL/L (ref 23–31)
CREAT CL PREDICTED SERPL C-G-VRATE: 0 ML/MIN (ref 70–130)
EOSINOPHIL # BLD AUTO: 0 THOU/UL (ref 0–0.7)
EOSINOPHIL NFR BLD AUTO: 0.1 % (ref 0–10)
GLOBULIN SER CALC-MCNC: 3.1 G/DL (ref 2.4–3.5)
GLUCOSE SERPL-MCNC: 111 MG/DL (ref 80–115)
HGB BLD-MCNC: 9.1 G/DL (ref 12–16)
LEUKOCYTE ESTERASE UR QL STRIP.AUTO: 75 LEU/UL
LYMPHOCYTES # BLD: 1 THOU/UL (ref 1.2–3.4)
LYMPHOCYTES NFR BLD AUTO: 7.5 % (ref 21–51)
MCH RBC QN AUTO: 30 PG (ref 27–31)
MCV RBC AUTO: 94.6 FL (ref 78–98)
MONOCYTES # BLD AUTO: 0.4 THOU/UL (ref 0.11–0.59)
MONOCYTES NFR BLD AUTO: 3 % (ref 0–10)
NEUTROPHILS # BLD AUTO: 11.9 THOU/UL (ref 1.4–6.5)
NEUTROPHILS NFR BLD AUTO: 89.1 % (ref 42–75)
PLATELET # BLD AUTO: 253 THOU/UL (ref 130–400)
POTASSIUM SERPL-SCNC: 4.1 MMOL/L (ref 3.5–5.1)
PROT UR STRIP.AUTO-MCNC: 30 MG/DL
RBC # BLD AUTO: 3.04 MILL/UL (ref 4.2–5.4)
RBC UR QL AUTO: (no result) HPF (ref 0–3)
SODIUM SERPL-SCNC: 135 MMOL/L (ref 136–145)
TROPONIN I SERPL DL<=0.01 NG/ML-MCNC: (no result) NG/ML (ref ?–0.03)
TROPONIN I SERPL DL<=0.01 NG/ML-MCNC: (no result) NG/ML (ref ?–0.03)
WBC # BLD AUTO: 13.3 THOU/UL (ref 4.8–10.8)
YEAST # UR AUTO: (no result) HPF

## 2019-07-05 PROCEDURE — 93005 ELECTROCARDIOGRAM TRACING: CPT

## 2019-07-05 PROCEDURE — 85025 COMPLETE CBC W/AUTO DIFF WBC: CPT

## 2019-07-05 PROCEDURE — 96360 HYDRATION IV INFUSION INIT: CPT

## 2019-07-05 PROCEDURE — 87077 CULTURE AEROBIC IDENTIFY: CPT

## 2019-07-05 PROCEDURE — 81015 MICROSCOPIC EXAM OF URINE: CPT

## 2019-07-05 PROCEDURE — 82553 CREATINE MB FRACTION: CPT

## 2019-07-05 PROCEDURE — 87186 SC STD MICRODIL/AGAR DIL: CPT

## 2019-07-05 PROCEDURE — 81003 URINALYSIS AUTO W/O SCOPE: CPT

## 2019-07-05 PROCEDURE — 76770 US EXAM ABDO BACK WALL COMP: CPT

## 2019-07-05 PROCEDURE — 90471 IMMUNIZATION ADMIN: CPT

## 2019-07-05 PROCEDURE — G0009 ADMIN PNEUMOCOCCAL VACCINE: HCPCS

## 2019-07-05 PROCEDURE — 36415 COLL VENOUS BLD VENIPUNCTURE: CPT

## 2019-07-05 PROCEDURE — 90670 PCV13 VACCINE IM: CPT

## 2019-07-05 PROCEDURE — 96361 HYDRATE IV INFUSION ADD-ON: CPT

## 2019-07-05 PROCEDURE — 84484 ASSAY OF TROPONIN QUANT: CPT

## 2019-07-05 PROCEDURE — 82550 ASSAY OF CK (CPK): CPT

## 2019-07-05 PROCEDURE — 36416 COLLJ CAPILLARY BLOOD SPEC: CPT

## 2019-07-05 PROCEDURE — 80053 COMPREHEN METABOLIC PANEL: CPT

## 2019-07-05 PROCEDURE — 87086 URINE CULTURE/COLONY COUNT: CPT

## 2019-07-05 PROCEDURE — 80048 BASIC METABOLIC PNL TOTAL CA: CPT

## 2019-07-06 LAB
ALBUMIN SERPL BCG-MCNC: 3 G/DL (ref 3.4–4.8)
ALP SERPL-CCNC: 244 U/L (ref 40–150)
ALT SERPL W P-5'-P-CCNC: 18 U/L (ref 8–55)
ANION GAP SERPL CALC-SCNC: 14 MMOL/L (ref 10–20)
AST SERPL-CCNC: 27 U/L (ref 5–34)
BASOPHILS # BLD AUTO: 0 THOU/UL (ref 0–0.2)
BASOPHILS NFR BLD AUTO: 0.3 % (ref 0–1)
BILIRUB SERPL-MCNC: 0.5 MG/DL (ref 0.2–1.2)
BUN SERPL-MCNC: 29 MG/DL (ref 9.8–20.1)
CALCIUM SERPL-MCNC: 8.3 MG/DL (ref 7.8–10.44)
CHLORIDE SERPL-SCNC: 102 MMOL/L (ref 98–107)
CO2 SERPL-SCNC: 24 MMOL/L (ref 23–31)
CREAT CL PREDICTED SERPL C-G-VRATE: 42 ML/MIN (ref 70–130)
EOSINOPHIL # BLD AUTO: 0.2 THOU/UL (ref 0–0.7)
EOSINOPHIL NFR BLD AUTO: 2.2 % (ref 0–10)
GLOBULIN SER CALC-MCNC: 3.2 G/DL (ref 2.4–3.5)
GLUCOSE SERPL-MCNC: 81 MG/DL (ref 80–115)
HGB BLD-MCNC: 8.3 G/DL (ref 12–16)
LYMPHOCYTES # BLD: 1.5 THOU/UL (ref 1.2–3.4)
LYMPHOCYTES NFR BLD AUTO: 13.3 % (ref 21–51)
MCH RBC QN AUTO: 30.1 PG (ref 27–31)
MCV RBC AUTO: 96.4 FL (ref 78–98)
MONOCYTES # BLD AUTO: 0.7 THOU/UL (ref 0.11–0.59)
MONOCYTES NFR BLD AUTO: 6.3 % (ref 0–10)
NEUTROPHILS # BLD AUTO: 8.5 THOU/UL (ref 1.4–6.5)
NEUTROPHILS NFR BLD AUTO: 78 % (ref 42–75)
PLATELET # BLD AUTO: 171 THOU/UL (ref 130–400)
POTASSIUM SERPL-SCNC: 3.9 MMOL/L (ref 3.5–5.1)
RBC # BLD AUTO: 2.75 MILL/UL (ref 4.2–5.4)
SODIUM SERPL-SCNC: 136 MMOL/L (ref 136–145)
WBC # BLD AUTO: 10.9 THOU/UL (ref 4.8–10.8)

## 2019-07-06 NOTE — PRG
DATE OF SERVICE:  07/06/2019



SUBJECTIVE:  Ms. Gomez is a pleasant 69-year-old female with past medical

history significant for stage 3/4 chronic kidney disease, dementia, type 2 
diabetes

mellitus, and recent admission with GI bleeding, who presented to the hospital 
with

complaints of nausea, vomiting, and diarrhea. 



The patient has been admitted with UTI and acute renal insufficiency.  Her 
nausea

and vomiting are much improved.  She is now tolerating a full diet without any

issue.  She denies chest pain and shortness of breath.  She denies fever and 
chills. 



OBJECTIVE:  VITAL SIGNS:  Blood pressure 126/61, pulse is 72, O2 saturation is 
94%

on room air, respirations 18, and temperature 98.6. 

GENERAL:  This is an obese  female, resting comfortably in bed, in no 
acute

distress. 

HEENT:  Head is atraumatic and normocephalic.  Mucous membranes appear moist. 

NECK:  Trachea is midline.  No JVD. 

CV:  S1 and S2.  Regular rhythm.  No appreciable murmurs, rubs, or gallops. 

LUNGS:  Regular respiratory rate and pattern.  Overall, clear to auscultation

bilaterally. 

ABDOMEN:  Positive bowel sounds.  Soft, nontender. 

EXTREMITIES:  No edema. 

SKIN:  Warm and dry. 

NEUROLOGIC:  Cranial nerves 2 through 12 are grossly intact.  The patient is 
alert

and oriented.  She is nonfocal. 



LABORATORY DATA:  White blood cell count 10.9, hemoglobin 8.3, hematocrit 26.5, 
and

platelet count is 171.  Sodium is 136, potassium 3.9, chloride 102, carbon 
dioxide

24, anion gap is 14, BUN is 29, creatinine 2.43, and glucose 81.  AST, ALT 
normal.

Alkaline phosphatase is 244. 



ASSESSMENT:  

1. Urinary tract infection, culture positive for gram-negative rods.

2. Acute on chronic renal insufficiency, secondary to dehydration, orthostatics

positive, creatinine 2.43, baseline appears to be 1.7 to 1.8. 

3. Indeterminate troponin x1 in the setting of above.

4. Recent gastrointestinal bleed secondary to bleeding duodenal ulcer in the 
setting

of nonsteroidal anti-inflammatory drug use, status post cauterization June 28, 2019,

hemoglobin stable. 

5. Early-onset dementia.

6. Type 2 diabetes mellitus.

7. Hyperlipidemia.

8. Diastolic dysfunction.



PLAN:  At this time, the patient's orthostatics are positive and she will need

continued IV fluid resuscitation; we will continue to monitor her kidney

function closely.  We will avoid nephrotoxins and NSAIDs.  We will continue

Rocephin, and await her culture results.  Continue her

PPI as well. Will obtain a renal ultrasound as well, as Cr at this point has 
even gone up from her admission.







Job ID:  [id number]



MTDD

## 2019-07-06 NOTE — HP
PRIMARY CARE PHYSICIAN:  Taiwo Burns MD



CHIEF COMPLAINT:  Nausea, vomiting, diarrhea.



HISTORY OF PRESENT ILLNESS:  Ms. Gomez is a 69-year-old female with a past

medical history of dementia, who reported to the emergency room today from the

nursing home with evaluation of hypotension, nausea, and decreased appetite.  The

patient denies bloody stools.  Per EMS, her blood pressure on route was 124/61.  Per

the patient's medical record, she was admitted to the hospital on 06/27/2019 for

acute anemia and GI bleed.  While she was admitted, she did receive a blood

transfusion and got a colonoscopy.  During the evaluation in the emergency room, the

patient was found to have a white blood cell count of 13.3, hemoglobin of 9.1, which

has improved over the last one on 07/01/2019, on that last admission when it was 8.

Chemistry; sodium 135, potassium 4.1, BUN 29; creatinine 2.3, which is a bump from

the last time, it was checked on 07/01/2019 and when it was 1.75; alkaline

phosphatase is 269, .  Initial troponin was also slightly bumped at 0.029.

The patient was also found to have a UTI.  UA was positive for a protein, blood,

leukocyte esterase, white blood cell count, squamous, 1+ bacteria.  The patient was

given Levaquin in the emergency room as well as a liter of fluid.  The patient was

initially going to be sent home, but troponin and recent history and hospitalization

felt that the patient should be admitted for observation, should have her troponins

trended, UTI treated with gentle hydration and rechecked in the morning to see how

she was feeling. 



PAST MEDICAL AND SURGICAL HISTORY:  

1. Dementia.

2. Chronic kidney disease.

3. Diabetes type 2.

4. Dyslipidemia.

5. Hypertension.

6. Hypothyroidism.

7. Cervical radiculopathy.

8. Left total knee replacement.

9. Depression.

10. Obesity.



ALLERGIES:  NONE.  DOES GET A RASH WITH TAPE.



SOCIAL HISTORY:  Denies any alcohol or drug use.  Lives in [place].  Denies

alcohol.  No smoking history. 



HOME MEDICATIONS:  

1. Tylenol with Codeine 300-60 mg one tablet p.o. b.i.d.

2. Albuterol inhaler two puffs q.6 hours as needed.

3. Elavil 25 mg p.o. daily.

4. Atorvastatin 40 mg p.o. daily.

5. Calcitriol 0.5 mg p.o. daily.

6. Gabapentin 300 mg p.o. b.i.d.

7. Glipizide 5 mg p.o. daily.

8. Levothyroxine 100 mcg p.o. daily.

9. Lisinopril 2.5 mg p.o. daily.

10. Namenda ER 5 mg p.o. at bedtime.

11. Protonix 20 mg p.o. daily.

12. Tramadol 50 mg p.o. q.12 hours as needed.



REVIEW OF SYSTEMS:  The patient reports that she had some nausea and some vomiting

earlier in the day, but reports that the nausea is better.  She denies any abdominal

pain.  Denies any constipation or diarrhea.  Denies fevers or chills.  Denies any

dizziness or headache.  All other systems are reviewed and are negative unless

mentioned in the HPI. 



PHYSICAL EXAMINATION:

VITAL SIGNS:  Blood pressure is 176/74, pulse is 65, respirations are 17,

temperature is 97.9, pO2 saturations are 97% on room air. 

CONSTITUTIONAL:  The patient appears nontoxic.  She is alert and oriented to person,

place, and time, is in no apparent distress. 

HEENT:  Head is atraumatic and normocephalic.  Eyes; eyelids are normal to

inspection.  Pupils equally round and reactive to light.  ENT; mucous membranes are

moist.  Mouth exam is normal. 

NECK:  Normal range of motion.  Trachea is midline. 

RESPIRATORY/CHEST:  Breath sounds are clear.  Chest movement is symmetrical. 

CARDIOVASCULAR:  Regular heart rate and rhythm.  Heart sounds are normal. 

ABDOMEN:  Nontender.  Bowel sounds are heard. 

BACK:  Normal inspection.  Normal range of motion. 

EXTREMITIES:  Upper extremity, normal inspection, normal range of motion.  Lower

extremity, normal inspection, normal range of motion.  Pedal pulses are equal.  No

edema is noted. 

NEUROLOGIC:  The patient is oriented to person, place, and time.  Speech is normal. 

SKIN:  Warm and dry.  Normal in color. 

PSYCH:  She has a normal affect.



IMAGING STUDIES:  EKG shows sinus suzanne, beats per minute 53.  Compared with the

previous EKG of 06/27, and there is no change.  There is a left axis. 



LABORATORY DATA:  White blood cell count 13.3, hemoglobin 9.1, and platelet count is

253.  Sodium is 135, potassium is 4.1, chloride is 96, BUN is 29, creatinine is 2.3,

estimated GFR is 21, glucose is 111, calcium is 8.5, AST is 27, ALT is 22, alkaline

phosphatase is 269.  CK is 228, CK-MB is 8.9.  First troponin is 0.029.  Subsequent

troponins are undetectable. 



ASSESSMENT AND PLAN:  

1. Elevated troponin.  The patient had a slightly bumped troponin initially in the

ER with an elevated CK-MB that either subsequently returned to normal.  The patient

was hospitalized from 06/27 through 07/03.  She did have an echocardiogram done at

that time, which showed ejection fraction visually estimated at 50%-55%, grade 1/3

diastolic, moderately dilated left atrium, mitral annular calcification, mild mitral

regurgitation, mild tricuspid regurgitation.  The patient denied any chest pain,

shortness of breath, any cardiac symptoms.  Felt it could be potentially acute on

chronic renal. 

2. Acute on chronic renal also with a urinary tract infection.  Creatinine on this

visit is 2.3.  Last time it was checked was 4 days ago at 171 and it was 1.75.

Gentle hydration.  The patient was given 1 L in the emergency room with 1/3

diastolic dysfunction.  We do not want to overload, but we will gently hydrate 75 mL

per hour x1 bag.  We will recheck creatinine in the morning.  The patient was given

Levaquin in the emergency room.  We scheduled some Rocephin while she is

hospitalized. 

3. Hyperlipidemia.  Home medications will be restarted.

4. Diabetes.  We will do Accu-Chek's before meals and at bedtime.  Add a sliding

scale for coverage. 

5. Dementia.  We will restart home medications.  We have sent urine out for culture.

 Check labs in the morning.  GI and deep venous thrombosis prophylaxes have 

been started.

6. Hospital course depend on clinical findings.







Job ID:  [id number]

## 2019-07-07 LAB
ALBUMIN SERPL BCG-MCNC: 2.8 G/DL (ref 3.4–4.8)
ALP SERPL-CCNC: 232 U/L (ref 40–150)
ALT SERPL W P-5'-P-CCNC: 18 U/L (ref 8–55)
ANION GAP SERPL CALC-SCNC: 10 MMOL/L (ref 10–20)
AST SERPL-CCNC: 28 U/L (ref 5–34)
BILIRUB SERPL-MCNC: 0.3 MG/DL (ref 0.2–1.2)
BUN SERPL-MCNC: 24 MG/DL (ref 9.8–20.1)
CALCIUM SERPL-MCNC: 7.9 MG/DL (ref 7.8–10.44)
CHLORIDE SERPL-SCNC: 103 MMOL/L (ref 98–107)
CO2 SERPL-SCNC: 27 MMOL/L (ref 23–31)
CREAT CL PREDICTED SERPL C-G-VRATE: 58 ML/MIN (ref 70–130)
GLOBULIN SER CALC-MCNC: 3.1 G/DL (ref 2.4–3.5)
GLUCOSE SERPL-MCNC: 94 MG/DL (ref 80–115)
HGB BLD-MCNC: 8.4 G/DL (ref 12–16)
MCH RBC QN AUTO: 31 PG (ref 27–31)
MCV RBC AUTO: 95.1 FL (ref 78–98)
MDIFF COMPLETE?: YES
PLATELET # BLD AUTO: 195 THOU/UL (ref 130–400)
POTASSIUM SERPL-SCNC: 4.1 MMOL/L (ref 3.5–5.1)
RBC # BLD AUTO: 2.69 MILL/UL (ref 4.2–5.4)
SODIUM SERPL-SCNC: 136 MMOL/L (ref 136–145)
WBC # BLD AUTO: 10.4 THOU/UL (ref 4.8–10.8)

## 2019-07-07 RX ADMIN — DOCUSATE SODIUM 50 MG AND SENNOSIDES 8.6 MG PRN TAB: 8.6; 5 TABLET, FILM COATED ORAL at 20:47

## 2019-07-07 RX ADMIN — HYDROCODONE BITARTRATE AND ACETAMINOPHEN PRN TAB: 5; 325 TABLET ORAL at 20:42

## 2019-07-07 NOTE — ULT
RENAL ULTRASOUND:

 

CLINICAL INDICATION: 

Diminished renal function.

 

FINDINGS: 

There are small kidneys bilaterally, each measuring approximately 8 cm in length without hydronephros
is or suspicious renal lesion evident.  Doppler color flow imaging is utilized and does reveal ureter
al jets within the urinary bladder, bilaterally.

 

IMPRESSION: 

Small-sized kidneys indicative of chronic medical renal disease.  There is no overt hydronephrosis.  


 

POS: RAYK

## 2019-07-07 NOTE — PDOC.PN
- Subjective


Encounter Start Date: 07/07/19


Encounter Start Time: 10:53





Ms. Gomez was seen today in follow-up of UTI and acute kidney injury. She 

does not have any complaints.  She is concerned however, about her strength and 

balance. She says she was falling a lot at home.





- Objective


MAR Reviewed: Yes


Vital Signs & Weight: 


 Vital Signs (12 hours)











  Temp Pulse Resp BP BP BP Pulse Ox


 


 07/07/19 07:45  97.7 F  80  20  119/70  137/68  119/75  94 L


 


 07/07/19 03:20  98.4 F  82  20  132/68    95








 Weight











Weight                         261 lb 14.4 oz














I&O: 


 











 07/06/19 07/07/19 07/08/19





 06:59 06:59 06:59


 


Intake Total 950 3645 


 


Output Total  1150 


 


Balance 950 2495 











Result Diagrams: 


 07/07/19 06:01





 07/07/19 06:01


Additional Labs: 


 Accuchecks











  07/07/19 07/06/19 07/06/19





  05:32 20:37 16:33


 


POC Glucose  93  109  121 H














Phys Exam





- Physical Examination


HEENT: PERRLA


Respiratory: no wheezing, no rales, no rhonchi, clear to auscultation bilateral


Cardiovascular: RRR, no significant murmur, no rub


Gastrointestinal: soft, non-tender, no distention, positive bowel sounds


Musculoskeletal: no edema





Dx/Plan


(1) UTI (urinary tract infection)


Status: Acute   





(2) Acute-on-chronic kidney injury


Code(s): N17.9 - ACUTE KIDNEY FAILURE, UNSPECIFIED; N18.9 - CHRONIC KIDNEY 

DISEASE, UNSPECIFIED   Status: Acute   





(3) COPD (chronic obstructive pulmonary disease)


Status: Chronic   





(4) Chronic diastolic (congestive) heart failure


Code(s): I50.32 - CHRONIC DIASTOLIC (CONGESTIVE) HEART FAILURE   Status: 

Chronic   





(5) Diabetes mellitus


Code(s): E11.9 - TYPE 2 DIABETES MELLITUS WITHOUT COMPLICATIONS   Status: 

Chronic   





(6) HTN (hypertension)


Code(s): I10 - ESSENTIAL (PRIMARY) HYPERTENSION   Status: Chronic   





- Plan





* UTI- urine culture is growing Proteus- which is sensitive to cephalosporins- 

will change to Omnicef


* Acute on chronic kidney disease- her renal function is back at baseline- 

renal ultrasound was noted- she had some findings consistent with chronic 

medical renal disease- will discontinue IV fluids


* Deconditioning- she would like to go to Rehab- will screen tomorrow


* DM- blood glucose is stable


* HTN- blood pressure is stable

## 2019-07-08 LAB
ANION GAP SERPL CALC-SCNC: 11 MMOL/L (ref 10–20)
BUN SERPL-MCNC: 25 MG/DL (ref 9.8–20.1)
CALCIUM SERPL-MCNC: 8.1 MG/DL (ref 7.8–10.44)
CHLORIDE SERPL-SCNC: 103 MMOL/L (ref 98–107)
CO2 SERPL-SCNC: 26 MMOL/L (ref 23–31)
CREAT CL PREDICTED SERPL C-G-VRATE: 64 ML/MIN (ref 70–130)
GLUCOSE SERPL-MCNC: 104 MG/DL (ref 80–115)
POTASSIUM SERPL-SCNC: 4.1 MMOL/L (ref 3.5–5.1)
SODIUM SERPL-SCNC: 136 MMOL/L (ref 136–145)

## 2019-07-08 RX ADMIN — HYDROCODONE BITARTRATE AND ACETAMINOPHEN PRN TAB: 5; 325 TABLET ORAL at 05:48

## 2019-07-08 RX ADMIN — HYDROCODONE BITARTRATE AND ACETAMINOPHEN PRN TAB: 5; 325 TABLET ORAL at 20:43

## 2019-07-08 NOTE — PDOC.PN
- Subjective


Encounter Start Date: 07/08/19


Encounter Start Time: 14:37





Ms. Gomez was seen today in follow-up of UTI and acute renal failure. He 

does not have any new complaints. She mentioned some swelling in her right 

ankle.





- Objective


MAR Reviewed: Yes


Vital Signs & Weight: 


 Vital Signs (12 hours)











  Temp Pulse Pulse Resp BP BP BP


 


 07/08/19 12:30  98.0 F  87   18    136/84


 


 07/08/19 10:45    96   145/65 H  


 


 07/08/19 08:30  97.6 F  74   18    133/71


 


 07/08/19 07:50       


 


 07/08/19 03:44  99.4 F  90   17   112/55 L 














  Pulse Ox


 


 07/08/19 12:30  96


 


 07/08/19 10:45 


 


 07/08/19 08:30  95


 


 07/08/19 07:50  95


 


 07/08/19 03:44  93 L








 Weight











Weight                         261 lb 14.4 oz














I&O: 


 











 07/07/19 07/08/19 07/09/19





 06:59 06:59 06:59


 


Intake Total 3645 1260 


 


Output Total 1150 1140 


 


Balance 2495 120 











Result Diagrams: 


 07/07/19 06:01





 07/08/19 06:02


Additional Labs: 


 Accuchecks











  07/08/19 07/08/19 07/07/19





  10:34 05:53 20:17


 


POC Glucose  126 H  115 H  106














  07/07/19 07/07/19





  16:34 10:48


 


POC Glucose  142 H  128 H














Phys Exam





- Physical Examination


HEENT: PERRLA


Respiratory: no wheezing, no rales, no rhonchi, clear to auscultation bilateral


Cardiovascular: RRR, no significant murmur, no rub


Gastrointestinal: soft, non-tender, no distention, positive bowel sounds


+ mild swelling about the right ankle, no erythema, no warmth


good D.P. pulses





Dx/Plan


(1) UTI (urinary tract infection)


Status: Acute   





(2) Acute-on-chronic kidney injury


Code(s): N17.9 - ACUTE KIDNEY FAILURE, UNSPECIFIED; N18.9 - CHRONIC KIDNEY 

DISEASE, UNSPECIFIED   Status: Acute   





(3) COPD (chronic obstructive pulmonary disease)


Status: Chronic   





(4) Chronic diastolic (congestive) heart failure


Code(s): I50.32 - CHRONIC DIASTOLIC (CONGESTIVE) HEART FAILURE   Status: 

Chronic   





(5) Diabetes mellitus


Code(s): E11.9 - TYPE 2 DIABETES MELLITUS WITHOUT COMPLICATIONS   Status: 

Chronic   





(6) HTN (hypertension)


Code(s): I10 - ESSENTIAL (PRIMARY) HYPERTENSION   Status: Chronic   





- Plan





* UTI- continue Omnicef


* Acute kidney injury- resolved


* DM- blood glucose is stable


* COPD- stable


* Discussed with Case Management- Patient is already in [facility] skilled 

nursing facility- will need to contact Insurance and make sure she can return- 

plan is back to [facility] with PT/OT.

## 2019-07-09 RX ADMIN — DOCUSATE SODIUM 50 MG AND SENNOSIDES 8.6 MG PRN TAB: 8.6; 5 TABLET, FILM COATED ORAL at 13:26

## 2019-07-09 RX ADMIN — HYDROCODONE BITARTRATE AND ACETAMINOPHEN PRN TAB: 5; 325 TABLET ORAL at 20:55

## 2019-07-09 NOTE — PDOC.PN
- Subjective


Encounter Start Date: 07/09/19


Encounter Start Time: 11:46


Subjective: Admitted due to N/V/D. Found to have UTI and AARON


-: Feeling better.





- Objective


Vital Signs & Weight: 


 Vital Signs (12 hours)











  Temp Pulse Resp BP Pulse Ox


 


 07/09/19 07:36  98.4 F  79  18  118/63  93 L


 


 07/09/19 04:06  99.0 F  82  14  132/80  95








 Weight











Weight                         261 lb 14.4 oz














I&O: 


 











 07/08/19 07/09/19 07/10/19





 06:59 06:59 06:59


 


Intake Total 1260 1060 


 


Output Total 1140 1560 


 


Balance 120 -500 











Result Diagrams: 


 07/07/19 06:01





 07/08/19 06:02


Additional Labs: 


 Accuchecks











  07/09/19 07/08/19 07/08/19





  05:26 20:09 16:49


 


POC Glucose  142 H  235 H  188 H














Phys Exam





- Physical Examination


Constitutional: NAD


morbidly obese


HEENT: PERRLA, moist MMs


Neck: supple


Respiratory: no wheezing, no rales


fair air entry bilaterally


Cardiovascular: RRR


Gastrointestinal: soft, non-tender, no distention, positive bowel sounds


morbidly obese


Musculoskeletal: pulses present


bilateral ankle edema


Neurological: non-focal, moves all 4 limbs


Memory lapses noted


Psychiatric: A&O x 3





Dx/Plan


(1) Acute-on-chronic kidney injury


Code(s): N17.9 - ACUTE KIDNEY FAILURE, UNSPECIFIED; N18.9 - CHRONIC KIDNEY 

DISEASE, UNSPECIFIED   Status: Acute   





(2) UTI (urinary tract infection)


Status: Acute   





(3) CKD (chronic kidney disease) stage 3, GFR 30-59 ml/min


Code(s): N18.3 - CHRONIC KIDNEY DISEASE, STAGE 3 (MODERATE)   Status: Acute   





(4) Morbid obesity with BMI of 40.0-44.9, adult


Code(s): E66.01 - MORBID (SEVERE) OBESITY DUE TO EXCESS CALORIES; Z68.41 - BODY 

MASS INDEX (BMI) 40.0-44.9, ADULT   Status: Acute   





(5) COPD (chronic obstructive pulmonary disease)


Status: Chronic   





(6) Chronic diastolic (congestive) heart failure


Code(s): I50.32 - CHRONIC DIASTOLIC (CONGESTIVE) HEART FAILURE   Status: 

Chronic   





(7) Diabetes mellitus


Code(s): E11.9 - TYPE 2 DIABETES MELLITUS WITHOUT COMPLICATIONS   Status: 

Chronic   





(8) HTN (hypertension)


Code(s): I10 - ESSENTIAL (PRIMARY) HYPERTENSION   Status: Chronic   





- Plan


Continue antibiotic.


-: For discharge once placement is concluded.


-: Continue other current medications


-: PT to continue.


-: monitor renal function





* .

## 2019-07-10 LAB
ANION GAP SERPL CALC-SCNC: 12 MMOL/L (ref 10–20)
BASOPHILS # BLD AUTO: 0 THOU/UL (ref 0–0.2)
BASOPHILS NFR BLD AUTO: 0.1 % (ref 0–1)
BUN SERPL-MCNC: 20 MG/DL (ref 9.8–20.1)
CALCIUM SERPL-MCNC: 7.8 MG/DL (ref 7.8–10.44)
CHLORIDE SERPL-SCNC: 101 MMOL/L (ref 98–107)
CO2 SERPL-SCNC: 27 MMOL/L (ref 23–31)
CREAT CL PREDICTED SERPL C-G-VRATE: 81 ML/MIN (ref 70–130)
EOSINOPHIL # BLD AUTO: 0.5 THOU/UL (ref 0–0.7)
EOSINOPHIL NFR BLD AUTO: 5.2 % (ref 0–10)
GLUCOSE SERPL-MCNC: 118 MG/DL (ref 80–115)
HGB BLD-MCNC: 8 G/DL (ref 12–16)
LYMPHOCYTES # BLD: 1.7 THOU/UL (ref 1.2–3.4)
LYMPHOCYTES NFR BLD AUTO: 16 % (ref 21–51)
MCH RBC QN AUTO: 30 PG (ref 27–31)
MCV RBC AUTO: 94.7 FL (ref 78–98)
MONOCYTES # BLD AUTO: 0.8 THOU/UL (ref 0.11–0.59)
MONOCYTES NFR BLD AUTO: 7.4 % (ref 0–10)
NEUTROPHILS # BLD AUTO: 7.4 THOU/UL (ref 1.4–6.5)
NEUTROPHILS NFR BLD AUTO: 71.3 % (ref 42–75)
PLATELET # BLD AUTO: 209 THOU/UL (ref 130–400)
POTASSIUM SERPL-SCNC: 4.3 MMOL/L (ref 3.5–5.1)
RBC # BLD AUTO: 2.67 MILL/UL (ref 4.2–5.4)
SODIUM SERPL-SCNC: 136 MMOL/L (ref 136–145)
WBC # BLD AUTO: 10.4 THOU/UL (ref 4.8–10.8)

## 2019-07-10 RX ADMIN — DOCUSATE SODIUM 50 MG AND SENNOSIDES 8.6 MG PRN TAB: 8.6; 5 TABLET, FILM COATED ORAL at 08:39

## 2019-07-10 NOTE — PDOC.PN
- Subjective


Encounter Start Date: 07/10/19


Encounter Start Time: 11:38


Subjective: Feeling better. Off oxygen


-: N/V and ill feeling has subsided.





- Objective


Vital Signs & Weight: 


 Vital Signs (12 hours)











  Temp Pulse Resp BP BP Pulse Ox


 


 07/10/19 07:46       96


 


 07/10/19 07:43  98.0 F  76  17   111/56 L  96


 


 07/10/19 04:00  98.6 F  80  20  131/66   92 L


 


 07/10/19 00:00  97.9 F     








 Weight











Weight                         261 lb 14.4 oz














I&O: 


 











 07/09/19 07/10/19 07/11/19





 06:59 06:59 06:59


 


Intake Total 1060 920 


 


Output Total 1560 1200 


 


Balance -500 -280 











Result Diagrams: 


 07/10/19 04:25





 07/10/19 04:25


Additional Labs: 


 Accuchecks











  07/10/19 07/09/19 07/09/19





  06:05 20:35 16:32


 


POC Glucose  121 H  146 H  166 H














  07/09/19





  11:34


 


POC Glucose  121 H














Phys Exam





- Physical Examination


Constitutional: NAD


obese


HEENT: PERRLA, moist MMs


Neck: no JVD


Respiratory: no wheezing, no rales, no rhonchi, clear to auscultation bilateral


Cardiovascular: RRR


Gastrointestinal: soft, non-tender, no distention, positive bowel sounds


mild bilateral feet/leg edema


Neurological: non-focal, moves all 4 limbs


awake and conversatoiona. some memory lapses noted





Dx/Plan


(1) Acute-on-chronic kidney injury


Code(s): N17.9 - ACUTE KIDNEY FAILURE, UNSPECIFIED; N18.9 - CHRONIC KIDNEY 

DISEASE, UNSPECIFIED   Status: Acute   





(2) UTI (urinary tract infection)


Status: Acute   





(3) CKD (chronic kidney disease) stage 3, GFR 30-59 ml/min


Code(s): N18.3 - CHRONIC KIDNEY DISEASE, STAGE 3 (MODERATE)   Status: Acute   





(4) Morbid obesity with BMI of 40.0-44.9, adult


Code(s): E66.01 - MORBID (SEVERE) OBESITY DUE TO EXCESS CALORIES; Z68.41 - BODY 

MASS INDEX (BMI) 40.0-44.9, ADULT   Status: Acute   





(5) COPD (chronic obstructive pulmonary disease)


Status: Chronic   





(6) Chronic diastolic (congestive) heart failure


Code(s): I50.32 - CHRONIC DIASTOLIC (CONGESTIVE) HEART FAILURE   Status: 

Chronic   





(7) Diabetes mellitus


Code(s): E11.9 - TYPE 2 DIABETES MELLITUS WITHOUT COMPLICATIONS   Status: 

Chronic   





(8) HTN (hypertension)


Code(s): I10 - ESSENTIAL (PRIMARY) HYPERTENSION   Status: Chronic   





- Plan


Start low dose lasix for fluid overload.


-: Continue PT/OT.


-: Awaiting placement


-: Repeat BMP in the am.





* .

## 2019-07-11 VITALS — SYSTOLIC BLOOD PRESSURE: 152 MMHG | DIASTOLIC BLOOD PRESSURE: 78 MMHG

## 2019-07-11 VITALS — TEMPERATURE: 98.1 F

## 2019-07-11 LAB
ANION GAP SERPL CALC-SCNC: 14 MMOL/L (ref 10–20)
BASOPHILS # BLD AUTO: 0 THOU/UL (ref 0–0.2)
BASOPHILS NFR BLD AUTO: 0.1 % (ref 0–1)
BUN SERPL-MCNC: 17 MG/DL (ref 9.8–20.1)
CALCIUM SERPL-MCNC: 8.6 MG/DL (ref 7.8–10.44)
CHLORIDE SERPL-SCNC: 95 MMOL/L (ref 98–107)
CO2 SERPL-SCNC: 32 MMOL/L (ref 23–31)
CREAT CL PREDICTED SERPL C-G-VRATE: 74 ML/MIN (ref 70–130)
EOSINOPHIL # BLD AUTO: 0.4 THOU/UL (ref 0–0.7)
EOSINOPHIL NFR BLD AUTO: 3.3 % (ref 0–10)
GLUCOSE SERPL-MCNC: 96 MG/DL (ref 80–115)
HGB BLD-MCNC: 9.1 G/DL (ref 12–16)
LYMPHOCYTES # BLD: 1.7 THOU/UL (ref 1.2–3.4)
LYMPHOCYTES NFR BLD AUTO: 15.6 % (ref 21–51)
MCH RBC QN AUTO: 32.2 PG (ref 27–31)
MCV RBC AUTO: 94.5 FL (ref 78–98)
MONOCYTES # BLD AUTO: 0.6 THOU/UL (ref 0.11–0.59)
MONOCYTES NFR BLD AUTO: 5.7 % (ref 0–10)
NEUTROPHILS # BLD AUTO: 8.1 THOU/UL (ref 1.4–6.5)
NEUTROPHILS NFR BLD AUTO: 75.4 % (ref 42–75)
PLATELET # BLD AUTO: 205 THOU/UL (ref 130–400)
POTASSIUM SERPL-SCNC: 3.9 MMOL/L (ref 3.5–5.1)
RBC # BLD AUTO: 2.81 MILL/UL (ref 4.2–5.4)
SODIUM SERPL-SCNC: 137 MMOL/L (ref 136–145)
WBC # BLD AUTO: 10.8 THOU/UL (ref 4.8–10.8)

## 2019-07-11 RX ADMIN — HYDROCODONE BITARTRATE AND ACETAMINOPHEN PRN TAB: 5; 325 TABLET ORAL at 09:08

## 2019-07-11 NOTE — DIS
DATE OF ADMISSION:  07/05/2019



DATE OF DISCHARGE:  07/11/2019



PRIMARY CARE PHYSICIAN:  Taiwo Burns MD.



DISCHARGE DIAGNOSES:  

1. Urinary tract infection due to Proteus mirabilis.

2. Acute on chronic kidney injury.

3. Chronic kidney disease, stage 3.

4. Morbid obesity.

5. Chronic obstructive pulmonary disease.

6. Chronic diastolic heart failure.

7. Type 2 diabetes mellitus.

8. Hypertension.



CONSULTS:  None.



HOSPITAL COURSE:  A 69-year-old obese female with history of CKD, dementia, recent

GI bleed, and physical deconditioning, requiring recent admission to a skilled

nursing facility, admitted due to acute onset of nausea and vomiting, associated

with generalized ill feeling and poor oral intake.  The patient was found to have

urinary tract infection associated as well as acute kidney injury with a creatinine

of 2.3, which is above her baseline of 1.7.  She was treated appropriately with

antibiotics and IV fluid with resolution of AARON and symptoms of nausea, vomiting,

and diarrhea.  Oral intake improved and antibiotics was subsequently transitioned to

 __________ with bacterial susceptibility.  Hospital course, however, was

complicated by development of bilateral leg swelling, which was thought to be

related to IV fluids and this improved with oral Lasix.  The patient also was found

to have physical deconditioning, requiring PT and was subsequently discharged back

to the skilled nursing facility for further restorative therapy. 



PHYSICAL EXAMINATION:

VITAL SIGNS:  Temperature 99.4, pulse 87, respiratory rate 18, SpO2 of 96% on room

air, blood pressure 108/67. 

GENERAL:  Morbidly obese female, in no obvious distress.  Afebrile.  Anicteric.

Acyanotic. 

HEENT:  Normocephalic, atraumatic.  Pupils are reacting to light.  Oral mucosa is

moist. 

CARDIOVASCULAR:  Regular rhythm and rate with normal heart sounds one and two. 

RESPIRATORY:  Fair air entry bilaterally with no obvious crackle or rhonchi or use

of accessory muscles. 

GI:  Abdomen is obese, soft, nontender, nondistended with normal bowel sounds. 

EXTREMITIES:  Grossly normal looking with bilateral ankle fullness.  However, no

obvious edema was appreciated. 



DISCHARGE DISPOSITION:  Skilled nursing facility.



DISCHARGE CONDITION:  Improved.



FOLLOWUP:  With the primary care provider at the facility within 3 days of discharge.



DISCHARGE MEDICATIONS:  

1. Albuterol sulfate two inhalations as needed.

2. Amitriptyline 25 mg p.o. daily.

3. Lipitor 40 mg p.o. daily.

4. Calcitriol 0.5 mcg daily.

5. Gabapentin 300 mg b.i.d.

6. Glipizide 5 mg p.o. daily.

7. Levothyroxine 100 mcg p.o. daily.

8. Memantine 14 mg capsule p.o. daily.

9. Protonix 20 mg p.o. daily.

10. Tramadol 50 mg b.i.d. as needed for pain.

11. Lasix 20 mg p.o. daily p.r.n. for edema, shortness of breath, and weight gain.



TIME SPENT:  This discharge took more than 35 minutes.







Job ID:  [id number]

## 2019-07-13 NOTE — EKG
Test Reason : 

Blood Pressure : ***/*** mmHG

Vent. Rate : 055 BPM     Atrial Rate : 055 BPM

   P-R Int : 168 ms          QRS Dur : 166 ms

    QT Int : 494 ms       P-R-T Axes : -08 -50 109 degrees

   QTc Int : 472 ms

 

Sinus bradycardia

Left axis deviation

Left bundle branch block

Abnormal ECG

No change from 6/27

 

Confirmed by FRANCISCA NIXON ([id number]),  FISH COLÓN ([id number]) on 7/13/2019 11:38:48 AM

 

Referred By:  HUSSAIN           Confirmed By:FRANCISCA NIXON

## 2019-09-03 ENCOUNTER — HOSPITAL ENCOUNTER (OUTPATIENT)
Dept: HOSPITAL 92 - BICMAMMO | Age: 69
Discharge: HOME | End: 2019-09-03
Attending: FAMILY MEDICINE
Payer: MEDICARE

## 2019-09-03 DIAGNOSIS — Z12.31: Primary | ICD-10-CM

## 2019-09-03 PROCEDURE — 77063 BREAST TOMOSYNTHESIS BI: CPT

## 2019-09-03 PROCEDURE — 77067 SCR MAMMO BI INCL CAD: CPT

## 2019-09-03 NOTE — MMO
Bilateral MAMMO Bilat Screen [facility].

 

CLINICAL HISTORY:

Patient is 69 years old and is seen for screening. The patient has no family

history of breast cancer.  The patient has no personal history of cancer.

 

VIEWS:

The views performed were:  bilateral craniocaudal; bilateral craniocaudal with

tomosynthesis; bilateral mediolateral oblique with tomosynthesis; and left

mediolateral oblique.

 

FILMS COMPARED:

The present examination has been compared to prior imaging studies performed at

[facility] on 08/21/2014, 08/10/2016, 08/14/2017 and 08/21/2018.

 

MAMMOGRAM FINDINGS:

The breasts are almost entirely fat.

 

There are no suspicious masses, suspicious calcifications, or new areas of

architectural distortion.

 

IMPRESSION:

THERE IS NO MAMMOGRAPHIC EVIDENCE OF MALIGNANCY.

 

A ROUTINE FOLLOW-UP MAMMOGRAM IN 1 YEAR IS RECOMMENDED.

 

THE RESULTS OF THIS EXAM WERE SENT TO THE PATIENT.

 

ACR BI-RADS Category 1 - Negative

 

MAMMOGRAPHY NOTE:

 1. A negative mammogram report should not delay a biopsy if a dominant of

 clinically suspicious mass is present.

 2. Approximately 10% to 15% of breast cancers are not detected by

 mammography.

 3. Adenosis and dense breasts may obscure an underlying neoplasm.

 

 

Reported by: ALICIA ALVES MD

Electonically Signed: [id number]

## 2019-09-11 ENCOUNTER — HOSPITAL ENCOUNTER (INPATIENT)
Dept: HOSPITAL 92 - ERS | Age: 69
LOS: 7 days | Discharge: NURSING, REHAB OR LONG-TERM CARE | DRG: 871 | End: 2019-09-18
Attending: INTERNAL MEDICINE | Admitting: INTERNAL MEDICINE
Payer: MEDICARE

## 2019-09-11 VITALS — BODY MASS INDEX: 44.6 KG/M2

## 2019-09-11 DIAGNOSIS — E03.9: ICD-10-CM

## 2019-09-11 DIAGNOSIS — I13.0: ICD-10-CM

## 2019-09-11 DIAGNOSIS — A41.1: Primary | ICD-10-CM

## 2019-09-11 DIAGNOSIS — D63.1: ICD-10-CM

## 2019-09-11 DIAGNOSIS — F32.9: ICD-10-CM

## 2019-09-11 DIAGNOSIS — I50.32: ICD-10-CM

## 2019-09-11 DIAGNOSIS — N17.9: ICD-10-CM

## 2019-09-11 DIAGNOSIS — Z96.652: ICD-10-CM

## 2019-09-11 DIAGNOSIS — N18.4: ICD-10-CM

## 2019-09-11 DIAGNOSIS — J44.9: ICD-10-CM

## 2019-09-11 DIAGNOSIS — Z91.048: ICD-10-CM

## 2019-09-11 DIAGNOSIS — E11.22: ICD-10-CM

## 2019-09-11 DIAGNOSIS — F03.90: ICD-10-CM

## 2019-09-11 DIAGNOSIS — K75.0: ICD-10-CM

## 2019-09-11 DIAGNOSIS — E66.01: ICD-10-CM

## 2019-09-11 DIAGNOSIS — Z79.899: ICD-10-CM

## 2019-09-11 DIAGNOSIS — I34.0: ICD-10-CM

## 2019-09-11 LAB
ALBUMIN SERPL BCG-MCNC: 3.2 G/DL (ref 3.4–4.8)
ALP SERPL-CCNC: 291 U/L (ref 40–150)
ALT SERPL W P-5'-P-CCNC: 12 U/L (ref 8–55)
ANION GAP SERPL CALC-SCNC: 18 MMOL/L (ref 10–20)
AST SERPL-CCNC: 19 U/L (ref 5–34)
BACTERIA UR QL AUTO: (no result) HPF
BILIRUB SERPL-MCNC: 0.6 MG/DL (ref 0.2–1.2)
BUN SERPL-MCNC: 24 MG/DL (ref 9.8–20.1)
CALCIUM SERPL-MCNC: 9.9 MG/DL (ref 7.8–10.44)
CHLORIDE SERPL-SCNC: 99 MMOL/L (ref 98–107)
CK SERPL-CCNC: 33 U/L (ref 29–168)
CO2 SERPL-SCNC: 25 MMOL/L (ref 23–31)
CREAT CL PREDICTED SERPL C-G-VRATE: 0 ML/MIN (ref 70–130)
GLOBULIN SER CALC-MCNC: 3.9 G/DL (ref 2.4–3.5)
GLUCOSE SERPL-MCNC: 163 MG/DL (ref 80–115)
HGB BLD-MCNC: 10.8 G/DL (ref 12–16)
MCH RBC QN AUTO: 27.3 PG (ref 27–31)
MCV RBC AUTO: 87 FL (ref 78–98)
MDIFF COMPLETE?: YES
PLATELET # BLD AUTO: 322 THOU/UL (ref 130–400)
POTASSIUM SERPL-SCNC: 4 MMOL/L (ref 3.5–5.1)
PROT UR STRIP.AUTO-MCNC: 30 MG/DL
RBC # BLD AUTO: 3.95 MILL/UL (ref 4.2–5.4)
RBC UR QL AUTO: (no result) HPF (ref 0–3)
SODIUM SERPL-SCNC: 138 MMOL/L (ref 136–145)
WBC # BLD AUTO: 26.4 THOU/UL (ref 4.8–10.8)
WBC UR QL AUTO: (no result) HPF (ref 0–3)

## 2019-09-11 PROCEDURE — 93306 TTE W/DOPPLER COMPLETE: CPT

## 2019-09-11 PROCEDURE — 99292 CRITICAL CARE ADDL 30 MIN: CPT

## 2019-09-11 PROCEDURE — 93005 ELECTROCARDIOGRAM TRACING: CPT

## 2019-09-11 PROCEDURE — 96365 THER/PROPH/DIAG IV INF INIT: CPT

## 2019-09-11 PROCEDURE — 81003 URINALYSIS AUTO W/O SCOPE: CPT

## 2019-09-11 PROCEDURE — 36415 COLL VENOUS BLD VENIPUNCTURE: CPT

## 2019-09-11 PROCEDURE — 96361 HYDRATE IV INFUSION ADD-ON: CPT

## 2019-09-11 PROCEDURE — 87186 SC STD MICRODIL/AGAR DIL: CPT

## 2019-09-11 PROCEDURE — 83605 ASSAY OF LACTIC ACID: CPT

## 2019-09-11 PROCEDURE — 87040 BLOOD CULTURE FOR BACTERIA: CPT

## 2019-09-11 PROCEDURE — A4353 INTERMITTENT URINARY CATH: HCPCS

## 2019-09-11 PROCEDURE — 36569 INSJ PICC 5 YR+ W/O IMAGING: CPT

## 2019-09-11 PROCEDURE — 76770 US EXAM ABDO BACK WALL COMP: CPT

## 2019-09-11 PROCEDURE — 80053 COMPREHEN METABOLIC PANEL: CPT

## 2019-09-11 PROCEDURE — 80048 BASIC METABOLIC PNL TOTAL CA: CPT

## 2019-09-11 PROCEDURE — 85007 BL SMEAR W/DIFF WBC COUNT: CPT

## 2019-09-11 PROCEDURE — 84484 ASSAY OF TROPONIN QUANT: CPT

## 2019-09-11 PROCEDURE — 96367 TX/PROPH/DG ADDL SEQ IV INF: CPT

## 2019-09-11 PROCEDURE — 85610 PROTHROMBIN TIME: CPT

## 2019-09-11 PROCEDURE — 85730 THROMBOPLASTIN TIME PARTIAL: CPT

## 2019-09-11 PROCEDURE — 84439 ASSAY OF FREE THYROXINE: CPT

## 2019-09-11 PROCEDURE — 47000 NEEDLE BIOPSY OF LIVER PERQ: CPT

## 2019-09-11 PROCEDURE — 81015 MICROSCOPIC EXAM OF URINE: CPT

## 2019-09-11 PROCEDURE — 87086 URINE CULTURE/COLONY COUNT: CPT

## 2019-09-11 PROCEDURE — 80202 ASSAY OF VANCOMYCIN: CPT

## 2019-09-11 PROCEDURE — C1751 CATH, INF, PER/CENT/MIDLINE: HCPCS

## 2019-09-11 PROCEDURE — 86803 HEPATITIS C AB TEST: CPT

## 2019-09-11 PROCEDURE — 87077 CULTURE AEROBIC IDENTIFY: CPT

## 2019-09-11 PROCEDURE — 77012 CT SCAN FOR NEEDLE BIOPSY: CPT

## 2019-09-11 PROCEDURE — 87340 HEPATITIS B SURFACE AG IA: CPT

## 2019-09-11 PROCEDURE — 88307 TISSUE EXAM BY PATHOLOGIST: CPT

## 2019-09-11 PROCEDURE — 85027 COMPLETE CBC AUTOMATED: CPT

## 2019-09-11 PROCEDURE — 36416 COLLJ CAPILLARY BLOOD SPEC: CPT

## 2019-09-11 PROCEDURE — 85025 COMPLETE CBC W/AUTO DIFF WBC: CPT

## 2019-09-11 PROCEDURE — 96360 HYDRATION IV INFUSION INIT: CPT

## 2019-09-11 PROCEDURE — 74176 CT ABD & PELVIS W/O CONTRAST: CPT

## 2019-09-11 PROCEDURE — 84443 ASSAY THYROID STIM HORMONE: CPT

## 2019-09-11 PROCEDURE — 87149 DNA/RNA DIRECT PROBE: CPT

## 2019-09-11 PROCEDURE — 86706 HEP B SURFACE ANTIBODY: CPT

## 2019-09-11 PROCEDURE — 76705 ECHO EXAM OF ABDOMEN: CPT

## 2019-09-11 PROCEDURE — 88333 PATH CONSLTJ SURG CYTO XM 1: CPT

## 2019-09-11 PROCEDURE — 82550 ASSAY OF CK (CPK): CPT

## 2019-09-11 PROCEDURE — 88334 PATH CONSLTJ SURG CYTO XM EA: CPT

## 2019-09-11 PROCEDURE — 51701 INSERT BLADDER CATHETER: CPT

## 2019-09-11 NOTE — ULT
RIGHT UPPER QUADRANT ULTRASOUND:

9/11/19

 

HISTORY: 

Sepsis. Evaluate for abscess. Liver mass. 

 

COMPARISON: 

None.

 

CORRELATION:

CT abdomen and pelvis without contrast, 9/11/19.

 

FINDINGS: 

Limited evaluation of the pancreas due to bowel gas. 

 

Heterogeneous appearance of the liver. There is an ill-defined hypodensity in the left hepatic lobe m
easuring 10.1 x 8.3 x 7.0 cm. Right hepatic lobe measures 15.1 cm and has a slightly heterogeneous ec
hotexture without definable mass. 

 

Common bile duct diameter is 0.4 cm. 

 

Surgically absent gallbladder. 

 

Right renal cortical thinning. No hydronephrosis. Right kidney measures 4.2 x 4.2 x 7.7 cm. 

 

Prominent right adrenal gland measuring 1.5 x 2.6 x 2.3 cm. 

 

IMPRESSION: 

Hypoechoic mass in the left hepatic lobe, incompletely evaluated. This lesion was noted on a CT perfo
rmed earlier today. Mass may represent a neoplastic process. Better interrogation with liver mass pro
tocol CT would be beneficial. 

 

POS: [facility]

## 2019-09-11 NOTE — HP
PRIMARY CARE PHYSICIAN:  Taiwo Burns MD



CHIEF COMPLAINT:  Syncope.



HISTORY OF PRESENT ILLNESS:  Ms. Gomez is a 69-year-old female, who reported to

the emergency room today via EMS after she was found slumped in her seat on the bus

on her way to the doctor's office.  EMS reports that she was hypotensive on the

scene.  The patient reports generalized weakness.  The patient reports that she had

2 episodes of kind of severe crampy abdominal pain diffusely and reported some

nausea.  Denied any vomiting.  Denies any diarrhea or constipation.  Denies any

fever or chills.  EMS reported that when they arrived, she was pale and lethargic,

with no signs of any trauma or neuro deficits.  During the evaluation in the ER, the

patient was found to have acute on chronic kidney injury with a creatinine of 2.15,

BUN of 24 with an estimated GFR of 23.  She was recently admitted in July of this

year and her GFR at that time was 38.  The patient also was found to have

leukocytosis with a white blood cell count of 26.4.  Hemoglobin appeared stable at

10.8 during her July admission, she was admitted for a GI bleed.  Her last

hemoglobin we checked here on 07/11/2019 was 9.1.  Chemistry other than the AARON,

glucose is 163, lactic acid is 2.9, alkaline phosphatase is 291, albumin 3.2,

globulin 3.9, and albumin/globulin ratio is 0.8.  They also did a CT scan without

contrast, impression; large mass in the left lower lobe of the liver, enlarged right

and left adrenal glands, evidence of posterior gastric fundal diverticulum, 1 cm

diameter slightly hyperdense exophytic nodule of the upper pole of the left kidney.

Consideration for followup nonemergent abdomen and pelvis CT with and without

contrast with liver mass protocol is recommended.  The patient admitted to the

telemetry unit for further management. 



PAST MEDICAL HISTORY:  Dementia; chronic kidney disease; diabetes, type 2;

dyslipidemia; hypertension; hypothyroidism; cervical radiculopathy; depression;

obesity; chronic obstructive pulmonary disease; chronic diastolic heart failure; and

hypertension. 



PAST SURGICAL HISTORY:  Left total knee replacement.



SOCIAL HISTORY:  Denies any alcohol or drug use.  Lives at [facility].  Uses a walker

for ambulation.  Denies any alcohol.  No smoking history. 



ALLERGIES:  NONE.  DOES GET A RASH WITH TAPE.



MEDICATIONS:  Home medications, which still need to be verified;

1. Atorvastatin 40 mg p.o. once a day. 

2. Calcitriol 0.5 mcg one tablet p.o. once a day.

3. Gabapentin 300 mg twice a day.

4. Amitriptyline 25 mg p.o. once a day.

5. Levothyroxine 1000 mcg p.o. once a day.  

6. Glipizide 1 tab 5 mg p.o. once a day.

7. Tramadol 50 mg p.o. once a day at bedtime.

8. Lisinopril 2.5 mg p.o. once a day.

9. Namenda XR 14 mg p.o. once a day.

10. Tylenol No. 3 every 12 hours as needed p.r.n.

11. Ventolin HFA one inhalation every 6 hours p.r.n.

12. Donepezil 5 mg p.o. once a day.



REVIEW OF SYSTEMS:  The patient reports some nausea.  Denies any vomiting.  Does

report some cramping like pain, which is diffuse.  Reports currently right now pain

is mostly epigastric.  Denies any constipation or diarrhea.  Denies any fever or

chills.  Does report not feeling well this morning, was going to her doctor's office

to get evaluated.  Denies any dizziness or headache.  Denies any chest pain.  Denies

any shortness of breath.  All other systems were reviewed and are negative unless

mentioned in the HPI. 



PHYSICAL EXAMINATION:

VITAL SIGNS:  Blood pressure 100/61, pulse is 71, respirations are 19, temperature

is 97.7, pO2 sats are 98% with 2 L of oxygen. 

CONSTITUTIONAL:  The patient appears ill.  She is alert and oriented to person,

place, and time. 

HEENT:  Head is atraumatic and normocephalic.  Eyes, pupils are equally round and

reactive to light.  Conjunctivae are pale.  There is no photophobia.  ENT, mouth

exam is normal.  Mucous membranes are moist. 

NECK:  Normal range of motion.  Trachea is midline. 

RESPIRATORY:  Chest, breath sounds are clear.  No signs of any respiratory distress. 

CARDIOVASCULAR:  Heart sounds are normal.  Regular heart rate and rhythm. 

ABDOMEN:  Tender in epigastric region and diffusely.  There is no rebound or

guarding. 

BACK:  Normal range of motion.  No CVA tenderness. 

EXTREMITIES:  Upper extremity; normal inspection, normal range of motion.  Motor

strength is normal.  Extremities are cold.  Radial pulses are normal.  Lower

extremity; motor strength is normal.  Sensation intact.  Pedal pulses are equal.

Lower extremities are also cold.  There is no calf tenderness noted. 

NEURO:  The patient is oriented to person, place, and time.  Speech is normal.

There are no focal motor or sensory deficits. 

SKIN:  Dry and pale. 

PSYCH:  Has a normal affect.



DIAGNOSTIC STUDIES:  EKG in the emergency room shows a normal sinus rhythm, beats

per minute are 64, has a complete left bundle-branch block, axis is left. 



ASSESSMENT AND PLAN:  

1. Sepsis.  The patient was given vancomycin and Zosyn 2 g in the ER and 2 L of

fluid.  Echocardiogram done in June of this year shows an ejection fraction of 50%

to 55% with grade 1/3 diastolic dysfunction.  We will continue the IV hydration at

50 mL per hour.  The patient on the last admission in July was fluid overloaded and

so we will attempt to not repeat that, but can increase fluids as needed.  We will

continue the vancomycin and Zosyn.  We have asked pharmacy to dose the vancomycin.

We will recheck lactic acid and recheck a CBC in the morning.  An abdominal

ultrasound has been ordered to try and differentiate what exactly is going on with

the mass as the patient's kidney function does not allow us to use contrast with the

CT scan. 

2. Acute on chronic kidney injury, on fluids.  We will recheck this in the morning.

3. Leukocytosis.  Please see #1.  We will recheck this in the morning.

4. History of diabetes, type 2.  Accu-Cheks a.c. and at bedtime.  Add a sliding

scale as needed for coverage. 

5. Hypothyroidism history.  We will restart home medications.  Check a TSH level in

the morning. 

6. History of chronic obstructive pulmonary disease.  This appears stable.  We can 

add p.r.n. DuoNebs as needed.

7. History of neuropathy.  We will continue the home medications.

8. Case discussed with Dr. Cope, who agrees with the plan.

9. Deep venous thrombosis and gastrointestinal prophylaxis have been started.

10. Hospital course depend on clinical findings.





Job ID:  [id number]

## 2019-09-11 NOTE — PDOC.EVN
Event Note





- Event Note


Event Note: 





Case reviewed.  Patient examined.  She has a little discomfort in the epigastric

, LUQ area.  Modestly tender there and RUQ.  She appeared to be septic.  No 

source identified.  Discussed with Radiology.  The liver lesion could be 

consistent with abscess.  Will attempt to drain it in the am.  Continue IVF and 

abx.

## 2019-09-11 NOTE — CT
Exam:

Abdomen and pelvic CT scan without IV contrast:



HISTORY:

Abdominal pain, anemia, hypotensive



FINDINGS:

Left hemidiaphragm elevation. The lung bases are clear. Large somewhat lobulated poorly circumscribed
 low-attenuation mass in the left lobe of the liver measuring 8.9 x 11.8 cm in size concerning for

primary or metastatic malignancy. Status post cholecystectomy with air within the common bile duct an
d intrahepatic ducts without significant dilatation. Probable 3.5 cm diameter posterior gastric

fundal diverticulum. Right adrenal mass measuring 1.6 x 2.4 x 2.6 cm. Left adrenal mass measuring 1.9
 x 2.4 x 2.3 cm. Visualized pancreas and spleen are unremarkable. Minimal irregularity of the

superior pole of the left kidney with some mild perirenal fat stranding with a 1 cm diameter slightly
 hyperdense nodular exophytic focus indeterminate from this study. Hemorrhagic cyst versus solid

nodule. No renal hydronephrosis. Moderate size right periumbilical fat-containing hernia. No CT evide
nce for acute appendicitis. Unremarkable uterus and adnexal regions.



IMPRESSION:

Large mass in the left lobe of the liver.

Enlarged right and left adrenal glands, not having the appearance of adenomas worrisome for metastasi
s.

Evidence for a posterior gastric fundal diverticulum.

1 cm diameter slightly hyperdense exophytic nodule off the upper pole of the left kidney, nonspecific
.

Consideration for follow-up nonemergent abdomen and pelvic CT scan with and without contrast with samanta
 mass protocol.



Reported By: Bipin Ortiz 

Electronically Signed:  9/11/2019 11:05 AM

## 2019-09-12 LAB
ALBUMIN SERPL BCG-MCNC: 2.5 G/DL (ref 3.4–4.8)
ALP SERPL-CCNC: 231 U/L (ref 40–150)
ALT SERPL W P-5'-P-CCNC: 13 U/L (ref 8–55)
ANION GAP SERPL CALC-SCNC: 13 MMOL/L (ref 10–20)
APTT PPP: 37.8 SEC (ref 22.9–36.1)
AST SERPL-CCNC: 26 U/L (ref 5–34)
BASOPHILS # BLD AUTO: 0 THOU/UL (ref 0–0.2)
BASOPHILS NFR BLD AUTO: 0.1 % (ref 0–1)
BILIRUB SERPL-MCNC: 0.4 MG/DL (ref 0.2–1.2)
BUN SERPL-MCNC: 27 MG/DL (ref 9.8–20.1)
CALCIUM SERPL-MCNC: 8.3 MG/DL (ref 7.8–10.44)
CHLORIDE SERPL-SCNC: 104 MMOL/L (ref 98–107)
CO2 SERPL-SCNC: 24 MMOL/L (ref 23–31)
CREAT CL PREDICTED SERPL C-G-VRATE: 41 ML/MIN (ref 70–130)
EOSINOPHIL # BLD AUTO: 0.4 THOU/UL (ref 0–0.7)
EOSINOPHIL NFR BLD AUTO: 2.2 % (ref 0–10)
GLOBULIN SER CALC-MCNC: 3.2 G/DL (ref 2.4–3.5)
GLUCOSE SERPL-MCNC: 104 MG/DL (ref 80–115)
HBSAG INDEX: 0.2 S/CO (ref 0–0.99)
HBV SURFACE AB SERPL IA-ACNC: 1.73 MIU/ML
HEP C INDEX: 0.05 S/CO (ref 0–0.79)
HGB BLD-MCNC: 8.9 G/DL (ref 12–16)
INR PPP: 1.2
LYMPHOCYTES # BLD: 1.2 THOU/UL (ref 1.2–3.4)
LYMPHOCYTES NFR BLD AUTO: 7.6 % (ref 21–51)
MCH RBC QN AUTO: 27.8 PG (ref 27–31)
MCV RBC AUTO: 87.8 FL (ref 78–98)
MONOCYTES # BLD AUTO: 1.1 THOU/UL (ref 0.11–0.59)
MONOCYTES NFR BLD AUTO: 6.5 % (ref 0–10)
NEUTROPHILS # BLD AUTO: 13.7 THOU/UL (ref 1.4–6.5)
NEUTROPHILS NFR BLD AUTO: 83.7 % (ref 42–75)
PLATELET # BLD AUTO: 214 THOU/UL (ref 130–400)
POTASSIUM SERPL-SCNC: 4.1 MMOL/L (ref 3.5–5.1)
PROTHROMBIN TIME: 15.1 SEC (ref 12–14.7)
RBC # BLD AUTO: 3.22 MILL/UL (ref 4.2–5.4)
SODIUM SERPL-SCNC: 137 MMOL/L (ref 136–145)
WBC # BLD AUTO: 16.4 THOU/UL (ref 4.8–10.8)

## 2019-09-12 PROCEDURE — 0FB03ZX EXCISION OF LIVER, PERCUTANEOUS APPROACH, DIAGNOSTIC: ICD-10-PCS

## 2019-09-12 PROCEDURE — B548ZZA ULTRASONOGRAPHY OF SUPERIOR VENA CAVA, GUIDANCE: ICD-10-PCS

## 2019-09-12 PROCEDURE — 02HV33Z INSERTION OF INFUSION DEVICE INTO SUPERIOR VENA CAVA, PERCUTANEOUS APPROACH: ICD-10-PCS

## 2019-09-12 RX ADMIN — VANCOMYCIN HYDROCHLORIDE SCH MLS: 750 INJECTION, POWDER, LYOPHILIZED, FOR SOLUTION INTRAVENOUS at 12:46

## 2019-09-12 NOTE — ULT
RENAL ULTRASOUND:

9/12/19

 

HISTORY: 

Evaluate renal mass seen on recent CT examination. 

 

TECHNIQUE:  

Multiplanar gray scale sonographic imaging of the kidneys and urinary bladder obtained.  

 

FINDINGS: 

Evaluation of the kidneys and urinary bladder are limited secondary to body habitus and obscuration f
rom bowel gas. Recent CT examination demonstrated bilateral adrenal masses. Left adrenal mass cannot 
be definitively visualized. There is a probable solid mass in the region of the right adrenal gland m
easuring up to 3.2 cm. 

 

The small lesion identified within the upper pole of the left kidney on recent CT cannot be visualize
d on this examination, likely secondary to obscuration by bowel gas. The left kidney measures approxi
mately 8.3 x 4.5 x 4.3 cm. 

 

Right kidney measures approximately 7.7 x 3.9 x 4.5 cm. Both kidneys are small. No hydronephrosis is 
seen.

 

The urinary bladder could not be visualized secondary to patient positioning. 

 

IMPRESSION: 

Suboptimal assessment of the kidneys. Both kidneys are relatively small. Upper pole of left kidney wa
s obscured by bowel gas and thus the lesion seen within the upper pole of the left kidney on recent C
T cannot be evaluated on this study. Recommend follow-up CT of the abdomen with and without IV contra
st. 

 

POS: [facility]

## 2019-09-12 NOTE — PDOC.HOSPP
- Subjective


Subjective: 





Doing well today.  Tolerated the liver lesion biopsy well.  Feels well in 

general.  





- Objective


Vital Signs & Weight: 


 Vital Signs (12 hours)











  Temp Pulse Pulse Pulse Resp BP BP


 


 09/12/19 19:45  98.2 F  73    16  


 


 09/12/19 16:29  98.9 F  89    16  


 


 09/12/19 14:45    72  87   136/67  136/81


 


 09/12/19 11:18  98.0 F  74    18  














  BP Pulse Ox


 


 09/12/19 19:45  128/64  96


 


 09/12/19 16:29  118/69  96


 


 09/12/19 14:45  


 


 09/12/19 11:18  130/72  99








 Weight











Admit Weight                   252 lb


 


Weight                         252 lb














I&O: 


 











 09/11/19 09/12/19 09/13/19





 06:59 06:59 06:59


 


Intake Total  1100 720


 


Balance  1100 720











Result Diagrams: 


 09/12/19 04:11





 09/12/19 04:11


Additional Labs: 


 Accuchecks











  09/12/19 09/12/19 09/12/19





  20:20 17:04 10:59


 


POC Glucose  102  98  108














  09/12/19 09/11/19





  05:25 18:12


 


POC Glucose  92  149 H














Hospitalist ROS





- Medication


Medications: 


Active Medications











Generic Name Dose Route Start Last Admin





  Trade Name Mattyq  PRN Reason Stop Dose Admin


 


Acetaminophen  650 mg  09/11/19 13:14  09/12/19 14:48





  Tylenol  PO   650 mg





  Q4H PRN   Administration





  Headache/Fever/Mild Pain (1-3)   





     





     





     


 


Famotidine  20 mg  09/11/19 21:00  09/12/19 20:18





  Pepcid  PO   20 mg





  QPM DEDE   Administration





     





     





     





     


 


Piperacillin Sod/Tazobactam  100 mls @ 200 mls/hr  09/11/19 18:00  09/12/19 18:

20





  Sod 4.5 gm/ Sodium Chloride  IVPB   100 mls





  Q6HR DEDE   Administration





     





     





     





     


 


Vancomycin HCl 750 mg/ Sodium  250 mls @ 250 mls/hr  09/12/19 12:00  09/12/19 12

:46





  Chloride  IVPB   250 mls





  1200 DEDE   Administration





     





     





     





     














- Exam


Heart: RRR, no murmur, no gallops, no rubs, normal peripheral pulses


Respiratory: CTAB, no wheezes, no rales, no ronchi, normal chest expansion, no 

tachypnea, normal percussion


Gastrointestinal: soft, non-tender, non-distended, normal bowel sounds, no 

palpable masses, no hepatomegaly, no splenomegaly, no bruit


Skin: normal turgor


Musculoskeletal: normal tone


Psychiatric: normal affect, normal behavior, A&O x 3





Hosp A/P


(1) Sepsis


Code(s): A41.9 - SEPSIS, UNSPECIFIED ORGANISM   Status: Acute   





(2) Liver mass


Code(s): R16.0 - HEPATOMEGALY, NOT ELSEWHERE CLASSIFIED   Status: Acute   





(3) Morbid obesity with BMI of 40.0-44.9, adult


Code(s): E66.01 - MORBID (SEVERE) OBESITY DUE TO EXCESS CALORIES; Z68.41 - BODY 

MASS INDEX (BMI) 40.0-44.9, ADULT   Status: Acute   





(4) COPD (chronic obstructive pulmonary disease)


Status: Chronic   





(5) Chronic diastolic (congestive) heart failure


Code(s): I50.32 - CHRONIC DIASTOLIC (CONGESTIVE) HEART FAILURE   Status: 

Chronic   





(6) Diabetes mellitus


Code(s): E11.9 - TYPE 2 DIABETES MELLITUS WITHOUT COMPLICATIONS   Status: 

Chronic   





(7) HTN (hypertension)


Code(s): I10 - ESSENTIAL (PRIMARY) HYPERTENSION   Status: Chronic   





(8) CKD (chronic kidney disease), stage IV


Code(s): N18.4 - CHRONIC KIDNEY DISEASE, STAGE 4 (SEVERE)   Status: Acute   





(9) Anemia in chronic kidney disease


Code(s): N18.9 - CHRONIC KIDNEY DISEASE, UNSPECIFIED; D63.1 - ANEMIA IN CHRONIC 

KIDNEY DISEASE   Status: Acute   





- Plan





Feeling much better.


Had liver biopsy today.  


Initial concern for liver abscess, but nothing drained.


Blood cultures with one likely contaminant.  One with [facility] with ID still pending.


Given the initial sepsis, Consult ID and continue IV abx.

## 2019-09-12 NOTE — CT
CT Liver Perc Biopsy



History: Liver mass/abscess



Comparison: Gallbladder ultrasound and CT prior day



Findings: Patient was brought to the CT suite. All questions were answered. Informed consent was obta
ined. Timeout performed.



Patient's abdomen was prepped and draped in normal sterile fashion. 8 mL of lidocaine was instilled i
nto the superficial and deep soft tissues. First a 18-gauge 10 cm was used to access the left

hepatic mass. A total of (3) 33 mm cores were obtained. Pathology confirmed atypical cells.



Next a 15 cm trocar was inserted within the center of the mass. There was no free fluid able to be as
pirated.



Patient tolerated the procedure well without complication.



Impression: 

1. Technically successful CT-guided liver mass biopsy.

2. Attempted aspiration/drainage placement although there was no significant central necrosis or pus 
able to be aspirated.



Reported By: Singh Lala 

Electronically Signed:  9/12/2019 11:41 AM

## 2019-09-13 LAB — VANCOMYCIN TROUGH SERPL-MCNC: 35.4 UG/ML

## 2019-09-13 RX ADMIN — VANCOMYCIN HYDROCHLORIDE SCH MLS: 750 INJECTION, POWDER, LYOPHILIZED, FOR SOLUTION INTRAVENOUS at 11:29

## 2019-09-13 RX ADMIN — HYDROCODONE BITARTRATE AND ACETAMINOPHEN PRN TAB: 5; 325 TABLET ORAL at 05:26

## 2019-09-13 NOTE — PDOC.HOSPP
- Subjective


Subjective: 





Has some nausea this morning.  No vomiting.   No abdominal pain.





- Objective


Vital Signs & Weight: 


 Vital Signs (12 hours)











  Temp Pulse Pulse Resp BP BP BP


 


 09/13/19 11:58  98.6 F  67   18   137/74 


 


 09/13/19 10:02    74   133/94 H  


 


 09/13/19 07:58  98.4 F  75   18    118/64


 


 09/13/19 07:50       














  Pulse Ox


 


 09/13/19 11:58  96


 


 09/13/19 10:02 


 


 09/13/19 07:58  95


 


 09/13/19 07:50  94 L








 Weight











Admit Weight                   252 lb


 


Weight                         252 lb














I&O: 


 











 09/12/19 09/13/19 09/14/19





 06:59 06:59 06:59


 


Intake Total 1100 1320 


 


Balance 1100 1320 











Result Diagrams: 


 09/12/19 04:11





 09/12/19 04:11


Additional Labs: 


 Accuchecks











  09/13/19 09/13/19 09/12/19





  10:58 05:33 20:20


 


POC Glucose  82  101  102














  09/12/19





  17:04


 


POC Glucose  98














Hospitalist ROS





- Medication


Medications: 


Active Medications











Generic Name Dose Route Start Last Admin





  Trade Name Freq  PRN Reason Stop Dose Admin


 


Acetaminophen  650 mg  09/11/19 13:14  09/12/19 14:48





  Tylenol  PO   650 mg





  Q4H PRN   Administration





  Headache/Fever/Mild Pain (1-3)   





     





     





     


 


Hydrocodone Bitart/Acetaminophen  1 tab  09/11/19 13:14  09/13/19 05:26





  [place] 5/325  PO   1 tab





  Q4H PRN   Administration





  Moderate Pain (4-6)   





     





     





     


 


Famotidine  20 mg  09/11/19 21:00  09/12/19 20:18





  Pepcid  PO   20 mg





  QPM DEDE   Administration





     





     





     





     


 


Piperacillin Sod/Tazobactam  100 mls @ 200 mls/hr  09/11/19 18:00  09/13/19 13:

09





  Sod 4.5 gm/ Sodium Chloride  IVPB   100 mls





  Q6HR DEDE   Administration





     





     





     





     


 


Vancomycin HCl 750 mg/ Sodium  250 mls @ 250 mls/hr  09/12/19 12:00  09/13/19 11

:29





  Chloride  IVPB   250 mls





  1200 DEDE   Administration





     





     





     





     


 


Ondansetron HCl  4 mg  09/11/19 13:14  09/13/19 09:31





  Zofran Odt  PO   4 mg





  Q6H PRN   Administration





  Nausea/Vomiting   





     





     





     














- Exam


General Appearance: NAD, awake alert


General - other findings: Morbidly obese.


Heart: RRR, no murmur, no gallops, no rubs, normal peripheral pulses


Respiratory: CTAB, no wheezes, no rales, no ronchi, normal chest expansion, no 

tachypnea, normal percussion


Gastrointestinal: soft, non-distended, normal bowel sounds, no palpable masses, 

no hepatomegaly, no splenomegaly, no bruit


Gastrointestinal - other findings: Very mild general tenderness.  


Skin: normal turgor


Neurological: no focal deficits


Musculoskeletal: normal tone


Psychiatric: normal affect, normal behavior





Hosp A/P


(1) Sepsis


Code(s): A41.9 - SEPSIS, UNSPECIFIED ORGANISM   Status: Acute   





(2) Liver mass


Code(s): R16.0 - HEPATOMEGALY, NOT ELSEWHERE CLASSIFIED   Status: Acute   





(3) Morbid obesity with BMI of 40.0-44.9, adult


Code(s): E66.01 - MORBID (SEVERE) OBESITY DUE TO EXCESS CALORIES; Z68.41 - BODY 

MASS INDEX (BMI) 40.0-44.9, ADULT   Status: Acute   





(4) COPD (chronic obstructive pulmonary disease)


Status: Chronic   





(5) Chronic diastolic (congestive) heart failure


Code(s): I50.32 - CHRONIC DIASTOLIC (CONGESTIVE) HEART FAILURE   Status: 

Chronic   





(6) Diabetes mellitus


Code(s): E11.9 - TYPE 2 DIABETES MELLITUS WITHOUT COMPLICATIONS   Status: 

Chronic   





(7) HTN (hypertension)


Code(s): I10 - ESSENTIAL (PRIMARY) HYPERTENSION   Status: Chronic   





(8) CKD (chronic kidney disease), stage IV


Code(s): N18.4 - CHRONIC KIDNEY DISEASE, STAGE 4 (SEVERE)   Status: Acute   





(9) Anemia in chronic kidney disease


Code(s): N18.9 - CHRONIC KIDNEY DISEASE, UNSPECIFIED; D63.1 - ANEMIA IN CHRONIC 

KIDNEY DISEASE   Status: Acute   





(10) Gram-positive cocci bacteremia


Code(s): R78.81 - BACTEREMIA   Status: Acute   





- Plan





Feeling much better.


Had liver biopsy 9/12.  


Initial concern for liver abscess, but nothing drained.


Blood cultures with one likely contaminant.  One with GPC with ID still pending.


Given the initial sepsis, Consult ID and continue IV abx.


Afebrile since admission.

## 2019-09-14 LAB
ANION GAP SERPL CALC-SCNC: 15 MMOL/L (ref 10–20)
BUN SERPL-MCNC: 19 MG/DL (ref 9.8–20.1)
CALCIUM SERPL-MCNC: 8.9 MG/DL (ref 7.8–10.44)
CHLORIDE SERPL-SCNC: 102 MMOL/L (ref 98–107)
CO2 SERPL-SCNC: 19 MMOL/L (ref 23–31)
CREAT CL PREDICTED SERPL C-G-VRATE: 50 ML/MIN (ref 70–130)
GLUCOSE SERPL-MCNC: 65 MG/DL (ref 80–115)
HGB BLD-MCNC: 10.1 G/DL (ref 12–16)
MCH RBC QN AUTO: 28.5 PG (ref 27–31)
MCV RBC AUTO: 87.4 FL (ref 78–98)
MDIFF COMPLETE?: YES
PLATELET # BLD AUTO: 198 THOU/UL (ref 130–400)
POTASSIUM SERPL-SCNC: 3.4 MMOL/L (ref 3.5–5.1)
RBC # BLD AUTO: 3.55 MILL/UL (ref 4.2–5.4)
SODIUM SERPL-SCNC: 133 MMOL/L (ref 136–145)
VANCOMYCIN TROUGH SERPL-MCNC: 21.7 UG/ML
WBC # BLD AUTO: 12.3 THOU/UL (ref 4.8–10.8)

## 2019-09-14 RX ADMIN — VANCOMYCIN HYDROCHLORIDE SCH: 750 INJECTION, POWDER, LYOPHILIZED, FOR SOLUTION INTRAVENOUS at 13:43

## 2019-09-14 NOTE — EKG
Test Reason : 

Blood Pressure : ***/*** mmHG

Vent. Rate : 064 BPM     Atrial Rate : 064 BPM

   P-R Int : 146 ms          QRS Dur : 148 ms

    QT Int : 438 ms       P-R-T Axes : -07 -64 093 degrees

   QTc Int : 451 ms

 

Normal sinus rhythm

Left axis deviation

Left bundle branch block

Abnormal ECG

 

Confirmed by NIDHI EDGAR, MARKEL TORRES ([id number]),  FISH COLÓN ([id number]) on 9/14/2019 1:26:01 PM

 

Referred By:             Confirmed By:MARKEL TERRELL MD

## 2019-09-14 NOTE — PDOC.HOSPP
- Subjective


Encounter Date: 09/14/19


Encounter Time: 09:35


Subjective: 





f/u MRSE bacteremia on Vanc/Zosyn and intermittent hypotension. Feels ok 

overall but nursing has noted occasional hypotension.





- Objective


Vital Signs & Weight: 


 Vital Signs (12 hours)











  Temp Pulse Resp BP Pulse Ox


 


 09/14/19 08:55  98.3 F  90  18  85/52 L  96


 


 09/14/19 04:00  99.3 F  119 H  20  108/53 L  92 L








 Weight











Admit Weight                   252 lb


 


Weight                         253 lb 6.4 oz














I&O: 


 











 09/13/19 09/14/19 09/15/19





 06:59 06:59 06:59


 


Intake Total 1320 1160 


 


Output Total  1040 


 


Balance 1320 120 











Result Diagrams: 


 09/14/19 05:21





 09/14/19 05:21


Additional Labs: 


 Accuchecks











  09/14/19 09/13/19 09/13/19





  05:27 20:23 17:08


 


POC Glucose  69 L  90  95














  09/13/19





  10:58


 


POC Glucose  82








Microbiology





09/11/19 10:50   Urine Straight Catheter   Urine Culture - Final


                              NO GROWTH AT 48 HOURS


09/11/19 09:48   Venous blood - Right Arm   Blood Culture - Preliminary


                              Coagulase Neg Staphylococcus


                              Coagulase Neg Staphylococcus#2


09/11/19 09:40   Venous blood - Left Hand   Blood Culture - Preliminary


                              Coagulase Neg Staphylococcus


                              Coagulase Neg Staphylococcus#2





 Laboratory Tests











  09/11/19 09/11/19 09/12/19





  09:38 09:38 04:11


 


WBC   26.4 H 


 


Hgb   10.8 L 


 


Neutrophils %   


 


Neutrophils % (Manual)   79 H 


 


Band Neuts % (Manual)   


 


Potassium    4.1


 


Creatinine  2.15 H   2.42 H


 


Vancomycin Trough   














  09/12/19 09/13/19 09/14/19





  04:11 13:08 05:21


 


WBC  16.4 H  


 


Hgb  8.9 L  


 


Neutrophils %  83.7 H  


 


Neutrophils % (Manual)    77 H


 


Band Neuts % (Manual)    17 H


 


Potassium   


 


Creatinine   


 


Vancomycin Trough   35.4 H* 











EKG Reviewed by me: Yes (Tele - SR)





Hospitalist ROS





- Medication


Medications: 


Active Medications











Generic Name Dose Route Start Last Admin





  Trade Name Freq  PRN Reason Stop Dose Admin


 


Acetaminophen  650 mg  09/11/19 13:14  09/12/19 14:48





  Tylenol  PO   650 mg





  Q4H PRN   Administration





  Headache/Fever/Mild Pain (1-3)   





     





     





     


 


Hydrocodone Bitart/Acetaminophen  1 tab  09/11/19 13:14  09/13/19 05:26





  [place] 5/325  PO   1 tab





  Q4H PRN   Administration





  Moderate Pain (4-6)   





     





     





     


 


Famotidine  20 mg  09/11/19 21:00  09/13/19 21:03





  Pepcid  PO   20 mg





  QPM DEDE   Administration





     





     





     





     


 


Piperacillin Sod/Tazobactam  100 mls @ 200 mls/hr  09/11/19 18:00  09/14/19 06:

18





  Sod 4.5 gm/ Sodium Chloride  IVPB   100 mls





  Q6HR DEDE   Administration





     





     





     





     


 


Vancomycin HCl 750 mg/ Sodium  250 mls @ 250 mls/hr  09/12/19 12:00  09/13/19 11

:29





  Chloride  IVPB   250 mls





  1200 DEDE   Administration





     





     





     





     


 


Ondansetron HCl  4 mg  09/11/19 13:14  09/14/19 02:59





  Zofran Odt  PO   4 mg





  Q6H PRN   Administration





  Nausea/Vomiting   





     





     





     














- Exam


General Appearance: NAD, awake alert


Eye: PERRL, anicteric sclera


ENT: normocephalic atraumatic, no oropharyngeal lesions


Neck: supple, symmetric, no JVD, no thyromegaly, no lymphadenopathy


Heart: RRR, no murmur, no gallops, no rubs


Respiratory: CTAB, no wheezes, no rales, no ronchi


Gastrointestinal: soft, non-distended, normal bowel sounds, no palpable masses, 

no guarding


Extremities: no cyanosis, no clubbing


Skin: normal turgor, no lesions


Neurological: cranial nerve grossly intact, no focal deficits, no new deficit


Musculoskeletal: generalized weakness


Psychiatric: A&O x 3





Hosp A/P


(1) Sepsis


Code(s): A41.9 - SEPSIS, UNSPECIFIED ORGANISM   Status: Acute   


Plan: 


MRSE on 2/2 blood cx, continue Vanc/Zosyn pending final blood cx results








(2) Hypotension


Status: Acute   


Plan: 


Hold all BP medications, IVF NS 100ml/h, serial BP monitoring








(3) AARON (acute kidney injury)


Code(s): N17.9 - ACUTE KIDNEY FAILURE, UNSPECIFIED   Status: Acute   


Plan: 


Improved, avoid nephrotoxic meds and limit contrast exposure








(4) CKD (chronic kidney disease), stage IV


Code(s): N18.4 - CHRONIC KIDNEY DISEASE, STAGE 4 (SEVERE)   Status: Acute   





(5) Diabetes mellitus


Code(s): E11.9 - TYPE 2 DIABETES MELLITUS WITHOUT COMPLICATIONS   Status: 

Chronic   


Plan: 


ISS, hold Glipizide pending more consistent po intake








- Plan


continue antibiotics, PT/OT, , out of bed/ambulate, DVT proph w/

SCDs





Continue Vanc/Zosyn pending final blood cx sensitivities


Add NS 100ml/h


Hold oral hypoglycemics until po intake more consistent


OOB/PT


AM lab: BMP, CBC, FT4

## 2019-09-15 LAB
ANION GAP SERPL CALC-SCNC: 14 MMOL/L (ref 10–20)
BUN SERPL-MCNC: 20 MG/DL (ref 9.8–20.1)
CALCIUM SERPL-MCNC: 8.1 MG/DL (ref 7.8–10.44)
CHLORIDE SERPL-SCNC: 104 MMOL/L (ref 98–107)
CO2 SERPL-SCNC: 22 MMOL/L (ref 23–31)
CREAT CL PREDICTED SERPL C-G-VRATE: 43 ML/MIN (ref 70–130)
GLUCOSE SERPL-MCNC: 78 MG/DL (ref 80–115)
HGB BLD-MCNC: 8.9 G/DL (ref 12–16)
MCH RBC QN AUTO: 28 PG (ref 27–31)
MCV RBC AUTO: 87.4 FL (ref 78–98)
MDIFF COMPLETE?: YES
PLATELET # BLD AUTO: 149 THOU/UL (ref 130–400)
POTASSIUM SERPL-SCNC: 3.6 MMOL/L (ref 3.5–5.1)
RBC # BLD AUTO: 3.19 MILL/UL (ref 4.2–5.4)
SODIUM SERPL-SCNC: 136 MMOL/L (ref 136–145)
WBC # BLD AUTO: 20.7 THOU/UL (ref 4.8–10.8)

## 2019-09-15 RX ADMIN — HYDROCODONE BITARTRATE AND ACETAMINOPHEN PRN TAB: 5; 325 TABLET ORAL at 12:31

## 2019-09-15 RX ADMIN — CEFTRIAXONE SCH MLS: 2 INJECTION, POWDER, FOR SOLUTION INTRAMUSCULAR; INTRAVENOUS at 15:42

## 2019-09-15 NOTE — CON
DATE OF CONSULTATION:  09/15/2019



REASON FOR CONSULTATION:  Possible liver abscess.



HISTORY OF PRESENT ILLNESS:  A 69-year-old with history of CKD, stage 3 to 4; 
type 2

diabetes; and obesity, who is a resident in a nursing home, [place], and was

admitted in June with gastrointestinal bleeding.  Hemoglobin was 7.6 on 
admission,

and she was transfused 1 unit, and GI did an upper and lower endoscopy.  She 
had a

small erosion in the second portion of duodenum, which was cauterized.  There 
was an

evidence of duodenal diverticulum.  Colonoscopy just with a 4-mm polyp in the

sigmoid colon, which was removed in July, she came in with acute onset of 
nausea and

vomiting, general malaise, poor oral intake.  She was given antimicrobial 
therapy,

broad spectrum, and was diagnosed with urinary tract infection.  The 
microbiology

demonstrated Proteus mirabilis with a broad susceptibility profile.  At this 
time,

she was brought in on 09/11 after having developed a near syncopal event while 
being

transported in the bus to her doctor's office.  She was found to be hypotensive.

Prior to this, she recalls episodes of crampy abdominal pain in the epigastric 
area,

some nausea, creatinine was about the same as previously.  She had a white cell

count elevation 26,000, hemoglobin of 10.8.  Abdomen and pelvis CT showed a 9 x 
12

cm mass, left lobe of the liver.  The bile duct appeared normal.  There was 
right

and left adrenal mass measuring 1.6 x 2 cm.  Microbiology with 2/2 sets of blood

cultures with Staphylococcus epidermidis.  This was a methicillin-resistant 
strain.

The samples were obtained about 10 minutes apart from 2 separate sites.  Urine

culture was no growth for 48 hours.  Biopsy was performed, no fluid was 
aspirated.

The results showed atypical ductal proliferation with cytologic atypia, the

background of acute inflammation, extensive necrosis.  The patient has been 
given

broad-spectrum antimicrobial coverage with vancomycin and currently, she feels

improved.  Denies headaches.  No visual symptoms, sore throat, odynophagia, or

dysphagia.  No neck pain.  A little bit of low back pain, which she has noticed

since admission, but not previously.  No vomiting anymore.  Still with some

abdominal pain in the epigastric area.  No joint symptoms or skin disorder.  No

neurological changes. 



PAST MEDICAL HISTORY:  CKD, stage 3 to 4; type 2 diabetes; dyslipidemia;

hypertension; hypothyroidism; upper GI bleed; duodenal ulcer; colonoscopy with 
colon

polyp; COPD; obesity; diastolic heart failure; hypertension; and bilateral 
TKRs. 



SOCIAL HISTORY:  [place] resident.  Never smoker.  No alcoholic beverage use.



ALLERGIES:  TAPE.



MEDICATIONS:  

1. Zosyn.

2. Vancomycin.

3. Now, she is on albuterol.

4. Hydrocodone.

5. Calcitriol.



PHYSICAL EXAMINATION:

VITAL SIGNS:  She has been afebrile since admission, blood pressure 119/70, 
pulse

99, respirations 18, and O2 saturation 94% to 97%. 

SKIN:  Normal.  No lymphadenopathy. 

HEENT:  Ocular movements conjugate.  Sclerae white.  Pupils are equal.  Nasal

passages patent.  Ear exam normal.  Oral cavity with artificial dentures, 
otherwise

normal. 

NECK:  Supple.  No jugular venous distention. 

LUNGS:  Symmetric, clear breath sounds. 

HEART:  S1 and S2 without murmurs.  No S3 or S4. 

ABDOMEN:  With moderate tenderness.  Some distention.  Tenderness localized to 
the

epigastric area mostly.  Liver about 2 cm below the right costal margin.  No

splenomegaly.  No other masses.  No bladder distention. 

EXTREMITIES:  Artificial joints in the knees appear without any inflammatory

process.  She is able to move extremities on command.  Pulses 1+ in dorsalis 
pedis.

No edema.  Plantar responses are flexor. 

NEUROLOGIC:  She knows her name and knows where she is.  Recollection otherwise 
is

somewhat limited. 



LABORATORY DATA:  White cell count is down from 26 to 20, hemoglobin 8.9, MCV 87
,

platelets 149, bands are down from 17 to 9.  INR 1.2.  Creatinine is 2.15 to 
2.3.

AST is normal.  ALT is normal.  Alkaline phosphatase elevated at 231.  
Bilirubin was

0.4, albumin is down from 3.2 to 2.5, globulin down from 3.9 to 3.2.  T4 0.86.  
TSH

0.1.  Urinalysis essentially normal.  Hepatitis serology negative.  Vancomycin

trough 35 and now 21. 



Chest x-ray with no acute cardiopulmonary disease from June.



ASSESSMENT:  Type 2 diabetes; obesity; hypertension; liver mass, left lobe, 9 x 
12

cm with necrosis, acute inflammatory changes, and some atypical ductal cells, 
and

positive blood cultures for Staph epidermidis. 



DISCUSSION:  The differential diagnosis includes liver abscess as the more 
likely

scenario.  This is probably hematogenous, although portal vein origin is not

completely ruled out.  Endocarditis is less likely.  The Staph epidermidis could

represent contamination of the sample or could be a true pathogen, in her case, 
if

the Staph epidermidis usually associated with devices such as central lines, 

shunts, and so on artificial heart valves.  Her case would be quite unusual to 
have

primary Staph epidermidis liver abscess without any other device in her body.  
She

does have those joint replacements, but they do not appear to have any 
inflammatory

process right now, so it is possible that the Staph epi represents 
contamination of

the sample, but it is hard to prove that beyond reasonable doubt without 
cultures

from the abscess itself.  Those were not feasible due to the lack of fluid that 
was

possible to be aspirated, so we are going to have to do is to treat her 
empirically

with a combination of Rocephin and vancomycin for protracted periods of time.

Follow up imaging studies.  Obtain a 2D echocardiogram, may need a ANGIE 
depending on

findings.  PICC line placement.  I will treat her for probably at least 6 weeks.

Follow up CT in about 2 to 3 weeks to see how it is progressing.  Malignancy 
appears

to be less likely.  The atypical cells are probably reactive change from the

inflammatory process.  Evidently, if the lesion does not reduce in size, then we

will have to repeat biopsy or maybe even consider surgical approach. 







Job ID:  [id number]



MTDD

## 2019-09-15 NOTE — PDOC.HOSPP
- Subjective


Encounter Date: 09/15/19


Encounter Time: 13:15


Subjective: 





f/u for MRSE bacteremia on Vanc/Zosyn. Feels better overall and BP improved.





- Objective


Vital Signs & Weight: 


 Vital Signs (12 hours)











  Temp Pulse Resp BP BP Pulse Ox


 


 09/15/19 09:01  98.3 F  99  18   119/70  94 L


 


 09/15/19 04:00  98.3 F  65  14  126/80   97








 Weight











Admit Weight                   252 lb


 


Weight                         253 lb 6.4 oz














I&O: 


 











 09/14/19 09/15/19 09/16/19





 06:59 06:59 06:59


 


Intake Total 1160 1560 


 


Output Total 1040 760 


 


Balance 120 800 











Result Diagrams: 


 09/15/19 05:36





 09/15/19 05:36


Additional Labs: 


 Accuchecks











  09/15/19 09/15/19 09/14/19





  10:55 05:39 20:23


 


POC Glucose  366 H  86  103














  09/14/19





  17:01


 


POC Glucose  85








Microbiology





09/11/19 10:50   Urine Straight Catheter   Urine Culture - Final


                              NO GROWTH AT 48 HOURS


09/11/19 09:48   Venous blood - Right Arm   Blood Culture - Final


                              Staphylococcus epidermidis


09/11/19 09:40   Venous blood - Left Hand   Blood Culture - Final


                              Staphylococcus epidermidis#2


09/11/19 09:48   Venous blood - Right Arm   Blood Culture - Preliminary


                              Coagulase Neg Staphylococcus


                              Coagulase Neg Staphylococcus#2


09/11/19 09:40   Venous blood - Left Hand   Blood Culture - Preliminary


                              Coagulase Neg Staphylococcus


                              Coagulase Neg Staphylococcus#2





 Laboratory Tests











  09/11/19 09/11/19 09/12/19





  09:38 09:38 04:11


 


WBC   26.4 H 


 


Hgb   10.8 L 


 


Neutrophils %   


 


Neutrophils % (Manual)   79 H 


 


Band Neuts % (Manual)   


 


Potassium    4.1


 


Creatinine  2.15 H   2.42 H


 


Vancomycin Trough   














  09/12/19 09/13/19 09/14/19





  04:11 13:08 05:21


 


WBC  16.4 H  


 


Hgb  8.9 L  


 


Neutrophils %  83.7 H  


 


Neutrophils % (Manual)    77 H


 


Band Neuts % (Manual)    17 H


 


Potassium   


 


Creatinine   


 


Vancomycin Trough   35.4 H* 








Liver Pathology -





reactive inflammation, atypia with necrosis


EKG Reviewed by me: Yes (Tele - SR)





Hospitalist ROS





- Medication


Medications: 


Active Medications











Generic Name Dose Route Start Last Admin





  Trade Name Freq  PRN Reason Stop Dose Admin


 


Acetaminophen  650 mg  09/11/19 13:14  09/12/19 14:48





  Tylenol  PO   650 mg





  Q4H PRN   Administration





  Headache/Fever/Mild Pain (1-3)   





     





     





     


 


Hydrocodone Bitart/Acetaminophen  1 tab  09/11/19 13:14  09/15/19 12:31





  [place] 5/325  PO   1 tab





  Q4H PRN   Administration





  Moderate Pain (4-6)   





     





     





     


 


Amitriptyline HCl  25 mg  09/15/19 09:00  09/15/19 09:05





  Elavil  PO   25 mg





  DAILY DEDE   Administration





     





     





     





     


 


Calcitriol  0.5 mcg  09/15/19 09:00  09/15/19 09:05





  Rocaltrol  PO   0.5 mcg





  DAILY DEDE   Administration





     





     





     





     


 


Donepezil HCl  5 mg  09/14/19 21:00  09/14/19 20:53





  Aricept  PO   5 mg





  HS DEDE   Administration





     





     





     





     


 


Famotidine  20 mg  09/11/19 21:00  09/14/19 20:53





  Pepcid  PO   20 mg





  QPM DEDE   Administration





     





     





     





     


 


Gabapentin  300 mg  09/14/19 21:00  09/15/19 09:05





  Neurontin  PO   300 mg





  BID DEDE   Administration





     





     





     





     


 


Piperacillin Sod/Tazobactam  100 mls @ 200 mls/hr  09/11/19 18:00  09/15/19 12:

27





  Sod 4.5 gm/ Sodium Chloride  IVPB   100 mls





  Q6HR DEDE   Administration





     





     





     





     


 


Sodium Chloride  1,000 mls @ 100 mls/hr  09/14/19 10:15  09/15/19 13:19





  Normal Saline 0.9%  IV   1,000 mls





  .Q10H DEDE   Administration





     





     





     





     


 


Vancomycin HCl 500 mg/ Sodium  100 mls @ 100 mls/hr  09/14/19 13:00  09/14/19 13

:43





  Chloride  IVPB   100 mls





  Q24HR DEDE   Administration





     





     





     





     


 


Levothyroxine Sodium  100 mcg  09/15/19 06:00  09/15/19 05:46





  Synthroid  PO   100 mcg





  0600 DEDE   Administration





     





     





     





     


 


Memantine  5 mg  09/15/19 09:00  09/15/19 09:05





  Namenda  PO   5 mg





  BID DEDE   Administration





     





     





     





     


 


Ondansetron HCl  4 mg  09/11/19 13:14  09/14/19 02:59





  Zofran Odt  PO   4 mg





  Q6H PRN   Administration





  Nausea/Vomiting   





     





     





     














- Exam


General Appearance: NAD, awake alert


Eye: PERRL, anicteric sclera


ENT: normocephalic atraumatic, no oropharyngeal lesions


Neck: supple, symmetric, no JVD, no thyromegaly, no lymphadenopathy


Heart: RRR, no murmur, no gallops, no rubs, normal peripheral pulses


Respiratory: CTAB, no wheezes, no rales, no ronchi, normal chest expansion


Gastrointestinal: soft, non-distended, normal bowel sounds, no palpable masses


Gastrointestinal - other findings: mild TTP in RUQ


Extremities: no cyanosis, no clubbing, no edema


Skin: normal turgor


Neurological: cranial nerve grossly intact, no new deficit


Musculoskeletal: normal tone, generalized weakness


Psychiatric: A&O x 3





Hosp A/P


(1) Sepsis


Code(s): A41.9 - SEPSIS, UNSPECIFIED ORGANISM   Status: Acute   


Plan: 


Likely due to hepatic abscess +/- MRSE as dominant organism, ID consulted, 

check Echo to r/o endocarditis, likely will need IV abx for x 4-6 weeks








(2) Hypotension


Status: Acute   


Plan: 


Improved, continue tx as outlined above








(3) AARON (acute kidney injury)


Code(s): N17.9 - ACUTE KIDNEY FAILURE, UNSPECIFIED   Status: Acute   


Plan: 


Limit nephrotoxic exposure and contrast agents, serial creatinine








(4) CKD (chronic kidney disease), stage IV


Code(s): N18.4 - CHRONIC KIDNEY DISEASE, STAGE 4 (SEVERE)   Status: Acute   





(5) Diabetes mellitus


Code(s): E11.9 - TYPE 2 DIABETES MELLITUS WITHOUT COMPLICATIONS   Status: 

Chronic   


Plan: 


Labile, resume home DM regimen, ISS








- Plan


continue antibiotics, PT/OT, , out of bed/ambulate, DVT proph w/

SCDs





Continue Vancomycin with plans for PICC line placement and IV abx


2D echo pending to r/o endocarditis


Decrease IVFs' 75ml/h


Hold oral hypoglycemics until po intake more consistent


OOB/PT


AM lab: BMP, CBC

## 2019-09-16 LAB
ANION GAP SERPL CALC-SCNC: 11 MMOL/L (ref 10–20)
BUN SERPL-MCNC: 19 MG/DL (ref 9.8–20.1)
CALCIUM SERPL-MCNC: 8 MG/DL (ref 7.8–10.44)
CHLORIDE SERPL-SCNC: 104 MMOL/L (ref 98–107)
CO2 SERPL-SCNC: 22 MMOL/L (ref 23–31)
CREAT CL PREDICTED SERPL C-G-VRATE: 48 ML/MIN (ref 70–130)
GLUCOSE SERPL-MCNC: 109 MG/DL (ref 80–115)
HGB BLD-MCNC: 9.1 G/DL (ref 12–16)
MCH RBC QN AUTO: 28.4 PG (ref 27–31)
MCV RBC AUTO: 87.6 FL (ref 78–98)
MDIFF COMPLETE?: YES
PLATELET # BLD AUTO: 129 THOU/UL (ref 130–400)
POTASSIUM SERPL-SCNC: 3.4 MMOL/L (ref 3.5–5.1)
RBC # BLD AUTO: 3.19 MILL/UL (ref 4.2–5.4)
SODIUM SERPL-SCNC: 134 MMOL/L (ref 136–145)
VANCOMYCIN TROUGH SERPL-MCNC: 15.7 UG/ML
WBC # BLD AUTO: 14.1 THOU/UL (ref 4.8–10.8)

## 2019-09-16 RX ADMIN — CEFTRIAXONE SCH MLS: 2 INJECTION, POWDER, FOR SOLUTION INTRAMUSCULAR; INTRAVENOUS at 15:58

## 2019-09-16 NOTE — SPC
Sonographic guided left upper extremity PICC placement



HISTORY: Staph infection. Liver mass.



FINDINGS: After explaining the procedure and answering all questions, the left arm was prepped and dr
aped in usual sterile fashion. Sterile technique, buffered local anesthesia, sonographic guidance,

and a 22-gauge needle were used to carefully access the left basilic vein. Standard technique was use
d to place the tip of a 5 [place] single lumen PICC so that the tip lies at the cavoatrial junction

of a left-sided superior vena cava. Catheter was flushed and secured externally. Patient tolerated th
e procedure well and was returned in unchanged condition.



Fluoroscopy time 0.1 minute.











IMPRESSION: Technically successful left upper extremity PICC placement. Ready for use.



Persistent left superior vena cava.



Reported By: MERRICK Mckee 

Electronically Signed:  9/16/2019 4:23 PM

## 2019-09-16 NOTE — PRG
DATE OF SERVICE:  09/16/2019



SUBJECTIVE:  Feeling better.  Pain has resolved in the abdominal area.  No

respiratory symptoms.  No diarrhea.  No genitourinary symptoms. 



OBJECTIVE:  VITAL SIGNS:  T-max 98.8, blood pressure 130/70, pulse 71, respirations

18, and O2 saturation 99%. 

GENERAL:  Appears in no distress. 

HEENT:  Ocular movements conjugate. 

LUNGS:  Clear. 

HEART:  S1 and S2, regular rate. 

ABDOMEN:  Mildly tender in the epigastric area.  Bowel sounds are present. 

MUSCULOSKELETAL:  No joint inflammatory activity.



LABORATORY DATA:  White cell count 14.1, hemoglobin 9.1, platelets 129,000.

Creatinine 2.13.  Microbiology with Staph epidermidis, two sets of blood cultures.

Echo not well visualized.  Aortic valve leaflets.  EF 55%.  Other valves were

normal.  PICC line has been inserted. 



ASSESSMENT AND DISCUSSION:  Type 2 diabetes, obesity, hypertension, liver mass

likely abscess.  The area could not be sent to micro because of the lack of fluid

when aspiration was attempted.  The Staphylococcus epidermidis could represent

contamination of the sample or true pathogenic role, although, that is less likely.

The recommendation is to continue Rocephin and vancomycin for at least 42 days.

Follow up liver imaging studies to verify resolution.  May need repeat attempt at

aspiration pending on clinical progress as well as progress of laboratory results. 







Job ID:  [id number]

## 2019-09-16 NOTE — PDOC.HOSPP
- Subjective


Encounter Date: 09/16/19


Encounter Time: 12:10


Subjective: 





f/u for hepatic abscess on Rocephin/Vancomycin with MRSE + blood cx x 2. Feels 

ok overall.





- Objective


Vital Signs & Weight: 


 Vital Signs (12 hours)











  Temp Pulse Resp BP BP Pulse Ox


 


 09/16/19 07:01  98.2 F  81  16  139/81   99


 


 09/16/19 05:23      118/57 L 


 


 09/16/19 04:00  98.8 F  93  16   147/76 H  98








 Weight











Admit Weight                   252 lb


 


Weight                         260 lb 11.2 oz














I&O: 


 











 09/15/19 09/16/19 09/17/19





 06:59 06:59 06:59


 


Intake Total 1560 2310 


 


Output Total 760 800 


 


Balance 800 1510 











Result Diagrams: 


 09/16/19 05:03





 09/16/19 05:03


Additional Labs: 


 Accuchecks











  09/16/19 09/16/19 09/15/19





  10:38 05:48 20:18


 


POC Glucose  142 H  110  136 H














  09/15/19





  17:09


 


POC Glucose  168 H








Microbiology





09/11/19 10:50   Urine Straight Catheter   Urine Culture - Final


                              NO GROWTH AT 48 HOURS


09/11/19 09:48   Venous blood - Right Arm   Blood Culture - Final


                              Staphylococcus epidermidis


09/11/19 09:40   Venous blood - Left Hand   Blood Culture - Final


                              Staphylococcus epidermidis#2


09/11/19 09:48   Venous blood - Right Arm   Blood Culture - Preliminary


                              Coagulase Neg Staphylococcus


                              Coagulase Neg Staphylococcus#2


09/11/19 09:40   Venous blood - Left Hand   Blood Culture - Preliminary


                              Coagulase Neg Staphylococcus


                              Coagulase Neg Staphylococcus#2





 Laboratory Tests











  09/11/19 09/11/19 09/12/19





  09:38 09:38 04:11


 


WBC   26.4 H 


 


Hgb   10.8 L 


 


Neutrophils %   


 


Neutrophils % (Manual)   79 H 


 


Band Neuts % (Manual)   


 


Potassium    4.1


 


Creatinine  2.15 H   2.42 H


 


Vancomycin Trough   














  09/12/19 09/13/19 09/14/19





  04:11 13:08 05:21


 


WBC  16.4 H  


 


Hgb  8.9 L  


 


Neutrophils %  83.7 H  


 


Neutrophils % (Manual)    77 H


 


Band Neuts % (Manual)    17 H


 


Potassium   


 


Creatinine   


 


Vancomycin Trough   35.4 H* 











Radiology Reviewed by me: Yes (Echo - EF 55-60%, technically limited exam)


EKG Reviewed by me: Yes (Tele - SR)





Hospitalist ROS





- Medication


Medications: 


Active Medications











Generic Name Dose Route Start Last Admin





  Trade Name Freq  PRN Reason Stop Dose Admin


 


Acetaminophen  650 mg  09/11/19 13:14  09/12/19 14:48





  Tylenol  PO   650 mg





  Q4H PRN   Administration





  Headache/Fever/Mild Pain (1-3)   





     





     





     


 


Hydrocodone Bitart/Acetaminophen  1 tab  09/11/19 13:14  09/15/19 12:31





  Norco 5/325  PO   1 tab





  Q4H PRN   Administration





  Moderate Pain (4-6)   





     





     





     


 


Calcitriol  0.5 mcg  09/15/19 09:00  09/16/19 09:07





  Rocaltrol  PO   0.5 mcg





  DAILY DEDE   Administration





     





     





     





     


 


Donepezil HCl  5 mg  09/14/19 21:00  09/15/19 22:06





  Aricept  PO   5 mg





  HS DEDE   Administration





     





     





     





     


 


Famotidine  20 mg  09/11/19 21:00  09/15/19 22:06





  Pepcid  PO   20 mg





  QPM DEDE   Administration





     





     





     





     


 


Gabapentin  300 mg  09/14/19 21:00  09/16/19 09:07





  Neurontin  PO   300 mg





  BID DEDE   Administration





     





     





     





     


 


Glipizide  5 mg  09/16/19 09:00  09/16/19 09:07





  Glucotrol  PO   5 mg





  DAILY DEDE   Administration





     





     





     





     


 


Vancomycin HCl 500 mg/ Sodium  100 mls @ 100 mls/hr  09/14/19 13:00  09/15/19 14

:33





  Chloride  IVPB   100 mls





  Q24HR DEDE   Administration





     





     





     





     


 


Sodium Chloride  1,000 mls @ 75 mls/hr  09/15/19 13:44  09/16/19 07:34





  Normal Saline 0.9%  IV   1,000 mls





  .[id number] DEDE   Administration





     





     





     





     


 


Ceftriaxone Sodium 2 gm/  100 mls @ 200 mls/hr  09/15/19 14:00  09/15/19 15:42





  Sodium Chloride  IVPB   100 mls





  Q24HR DEDE   Administration





     





     





     





     


 


Levothyroxine Sodium  100 mcg  09/15/19 06:00  09/16/19 07:32





  Synthroid  PO   100 mcg





  0600 DEDE   Administration





     





     





     





     


 


Memantine  5 mg  09/15/19 09:00  09/16/19 09:07





  Namenda  PO   5 mg





  BID DEDE   Administration





     





     





     





     


 


Ondansetron HCl  4 mg  09/11/19 13:14  09/14/19 02:59





  Zofran Odt  PO   4 mg





  Q6H PRN   Administration





  Nausea/Vomiting   





     





     





     














- Exam


General Appearance: NAD, awake alert


Eye: PERRL, anicteric sclera


ENT: normocephalic atraumatic, no oropharyngeal lesions


Neck: supple, symmetric, no JVD, no thyromegaly


Heart: RRR, no gallops, no rubs, normal peripheral pulses


Respiratory: CTAB, no wheezes, no rales, no ronchi, normal chest expansion


Gastrointestinal: soft, non-distended, normal bowel sounds


Extremities: no cyanosis, 1+ LE edema


Skin: normal turgor


Neurological: cranial nerve grossly intact, no new deficit


Musculoskeletal: normal tone, generalized weakness


Psychiatric: normal affect, A&O x 3





Hosp A/P


(1) Hepatic abscess


Code(s): K75.0 - ABSCESS OF LIVER   Status: Acute   


Plan: 


Suspected due to MRSE, continue Vanc/Rocephin IV, PICC line for IV abx x 6 weeks








(2) Sepsis


Code(s): A41.9 - SEPSIS, UNSPECIFIED ORGANISM   Status: Acute   


Plan: 


Due to MRSE with hepatic abscess, continue Rocephin/Vancomycin








(3) Hypotension


Status: Acute   


Plan: 


Resolved








(4) AARON (acute kidney injury)


Code(s): N17.9 - ACUTE KIDNEY FAILURE, UNSPECIFIED   Status: Acute   


Plan: 


Improving, avoid nephrotoxic meds and limit contrast, continue IVF NS @ 75ml/h








(5) CKD (chronic kidney disease), stage IV


Code(s): N18.4 - CHRONIC KIDNEY DISEASE, STAGE 4 (SEVERE)   Status: Chronic   





(6) Diabetes mellitus


Code(s): E11.9 - TYPE 2 DIABETES MELLITUS WITHOUT COMPLICATIONS   Status: 

Chronic   





- Plan


continue antibiotics, PT/OT, , out of bed/ambulate, DVT proph w/

SCDs





Continue Vancomycin/Rocephin with PICC line placement today


2D echo r/o endocarditis


Decrease IVFs' 75ml/h


Hold oral hypoglycemics until po intake more consistent


OOB/PT


AM lab: BMP, CBC

## 2019-09-17 LAB
ANION GAP SERPL CALC-SCNC: 13 MMOL/L (ref 10–20)
BUN SERPL-MCNC: 16 MG/DL (ref 9.8–20.1)
CALCIUM SERPL-MCNC: 8.4 MG/DL (ref 7.8–10.44)
CHLORIDE SERPL-SCNC: 107 MMOL/L (ref 98–107)
CO2 SERPL-SCNC: 19 MMOL/L (ref 23–31)
CREAT CL PREDICTED SERPL C-G-VRATE: 60 ML/MIN (ref 70–130)
GLUCOSE SERPL-MCNC: 89 MG/DL (ref 80–115)
HGB BLD-MCNC: 9.4 G/DL (ref 12–16)
MCH RBC QN AUTO: 27 PG (ref 27–31)
MCV RBC AUTO: 84.9 FL (ref 78–98)
MDIFF COMPLETE?: YES
PLATELET # BLD AUTO: 100 THOU/UL (ref 130–400)
POTASSIUM SERPL-SCNC: 3.5 MMOL/L (ref 3.5–5.1)
RBC # BLD AUTO: 3.48 MILL/UL (ref 4.2–5.4)
SODIUM SERPL-SCNC: 135 MMOL/L (ref 136–145)
VANCOMYCIN TROUGH SERPL-MCNC: 13.7 UG/ML
WBC # BLD AUTO: 14.1 THOU/UL (ref 4.8–10.8)

## 2019-09-17 RX ADMIN — CEFTRIAXONE SCH MLS: 2 INJECTION, POWDER, FOR SOLUTION INTRAMUSCULAR; INTRAVENOUS at 14:09

## 2019-09-17 NOTE — PDOC.HOSPP
- Subjective


Encounter Date: 09/17/19


Encounter Time: 15:25


Subjective: 





f/u for hepatic abscess with MRSE on Vanc/Rocephin with plans for 6 wks of IV 

abx. Feels ok overall.





- Objective


Vital Signs & Weight: 


 Vital Signs (12 hours)











  Temp Pulse Resp BP BP BP Pulse Ox


 


 09/17/19 11:09  98.4 F  82  18    115/73  95


 


 09/17/19 07:29  97.7 F  83  20  128/76    94 L


 


 09/17/19 04:00  99.2 F  83  20   120/68   92 L








 Weight











Admit Weight                   252 lb


 


Weight                         261 lb 8 oz














I&O: 


 











 09/16/19 09/17/19 09/18/19





 06:59 06:59 06:59


 


Intake Total 2310 2445 


 


Output Total 800  


 


Balance 1510 2445 











Result Diagrams: 


 09/17/19 05:34





 09/17/19 05:34


Additional Labs: 


 Accuchecks











  09/17/19 09/17/19 09/16/19





  10:50 05:40 20:15


 


POC Glucose  144 H  97  129 H














  09/16/19





  16:22


 


POC Glucose  120 H








Microbiology





09/11/19 10:50   Urine Straight Catheter   Urine Culture - Final


                              NO GROWTH AT 48 HOURS


09/11/19 09:48   Venous blood - Right Arm   Blood Culture - Final


                              Staphylococcus epidermidis


09/11/19 09:40   Venous blood - Left Hand   Blood Culture - Final


                              Staphylococcus epidermidis#2


09/11/19 09:48   Venous blood - Right Arm   Blood Culture - Preliminary


                              Coagulase Neg Staphylococcus


                              Coagulase Neg Staphylococcus#2


09/11/19 09:40   Venous blood - Left Hand   Blood Culture - Preliminary


                              Coagulase Neg Staphylococcus


                              Coagulase Neg Staphylococcus#2





 Laboratory Tests











  09/11/19 09/11/19 09/12/19





  09:38 09:38 04:11


 


WBC   26.4 H 


 


Hgb   10.8 L 


 


Neutrophils %   


 


Neutrophils % (Manual)   79 H 


 


Band Neuts % (Manual)   


 


Potassium    4.1


 


Creatinine  2.15 H   2.42 H


 


Vancomycin Trough   














  09/12/19 09/13/19 09/14/19





  04:11 13:08 05:21


 


WBC  16.4 H  


 


Hgb  8.9 L  


 


Neutrophils %  83.7 H  


 


Neutrophils % (Manual)    77 H


 


Band Neuts % (Manual)    17 H


 


Potassium   


 


Creatinine   


 


Vancomycin Trough   35.4 H* 














  09/15/19 09/15/19 09/16/19





  05:36 05:36 05:03


 


WBC   20.7 H 


 


Hgb   8.9 L 


 


Neutrophils %   


 


Neutrophils % (Manual)   79 H  56


 


Band Neuts % (Manual)   


 


Potassium   


 


Creatinine  2.32 H  


 


Vancomycin Trough   











EKG Reviewed by me: Yes (Tele - SR)





Hospitalist ROS





- Medication


Medications: 


Active Medications











Generic Name Dose Route Start Last Admin





  Trade Name Freq  PRN Reason Stop Dose Admin


 


Acetaminophen  650 mg  09/11/19 13:14  09/12/19 14:48





  Tylenol  PO   650 mg





  Q4H PRN   Administration





  Headache/Fever/Mild Pain (1-3)   





     





     





     


 


Hydrocodone Bitart/Acetaminophen  1 tab  09/11/19 13:14  09/15/19 12:31





  Norco 5/325  PO   1 tab





  Q4H PRN   Administration





  Moderate Pain (4-6)   





     





     





     


 


Amitriptyline HCl  25 mg  09/16/19 21:00  09/16/19 21:36





  Elavil  PO   25 mg





  HS DEDE   Administration





     





     





     





     


 


Calcitriol  0.5 mcg  09/15/19 09:00  09/17/19 08:37





  Rocaltrol  PO   0.5 mcg





  DAILY DEDE   Administration





     





     





     





     


 


Donepezil HCl  5 mg  09/14/19 21:00  09/16/19 21:36





  Aricept  PO   5 mg





  HS DEDE   Administration





     





     





     





     


 


Famotidine  20 mg  09/11/19 21:00  09/16/19 21:35





  Pepcid  PO   20 mg





  QPM DEDE   Administration





     





     





     





     


 


Gabapentin  300 mg  09/14/19 21:00  09/17/19 08:37





  Neurontin  PO   300 mg





  BID DEDE   Administration





     





     





     





     


 


Glipizide  5 mg  09/16/19 09:00  09/17/19 08:37





  Glucotrol  PO   5 mg





  DAILY DDEE   Administration





     





     





     





     


 


Vancomycin HCl 500 mg/ Sodium  100 mls @ 100 mls/hr  09/14/19 13:00  09/16/19 14

:12





  Chloride  IVPB   100 mls





  Q24HR DEDE   Administration





     





     





     





     


 


Ceftriaxone Sodium 2 gm/  100 mls @ 200 mls/hr  09/15/19 14:00  09/17/19 14:09





  Sodium Chloride  IVPB   100 mls





  Q24HR DEDE   Administration





     





     





     





     


 


Levothyroxine Sodium  100 mcg  09/15/19 06:00  09/17/19 05:22





  Synthroid  PO   100 mcg





  0600 DEDE   Administration





     





     





     





     


 


Memantine  5 mg  09/15/19 09:00  09/17/19 08:37





  Namenda  PO   5 mg





  BID DEDE   Administration





     





     





     





     


 


Ondansetron HCl  4 mg  09/11/19 13:14  09/14/19 02:59





  Zofran Odt  PO   4 mg





  Q6H PRN   Administration





  Nausea/Vomiting   





     





     





     


 


Sodium Chloride  10 ml  09/11/19 13:14  09/17/19 08:39





  Flush - Normal Saline  IVF   10 ml





  PRN PRN   Administration





  Saline Flush   





     





     





     














- Exam


General Appearance: NAD, awake alert


Eye: PERRL, anicteric sclera


ENT: normocephalic atraumatic, no oropharyngeal lesions


Neck: supple, symmetric, no JVD, no thyromegaly, no lymphadenopathy


Heart: RRR, no murmur, no gallops, no rubs, normal peripheral pulses


Respiratory: CTAB, no wheezes, no rales, no ronchi


Gastrointestinal: soft, non-tender, non-distended, normal bowel sounds


Extremities: no cyanosis, no clubbing


Skin: normal turgor, no lesions


Neurological: cranial nerve grossly intact, no new deficit


Musculoskeletal: normal tone, normal strength


Psychiatric: normal affect, A&O x 3





Hosp A/P


(1) Hepatic abscess


Code(s): K75.0 - ABSCESS OF LIVER   Status: Acute   


Plan: 


Continue IV Rocephin/Vancomycin for 6wks therapy








(2) Sepsis


Code(s): A41.9 - SEPSIS, UNSPECIFIED ORGANISM   Status: Acute   





(3) Hypotension


Status: Acute   


Plan: 


Resolved








(4) AARON (acute kidney injury)


Code(s): N17.9 - ACUTE KIDNEY FAILURE, UNSPECIFIED   Status: Acute   


Plan: 


Improved, saline lock IVF








(5) CKD (chronic kidney disease), stage IV


Code(s): N18.4 - CHRONIC KIDNEY DISEASE, STAGE 4 (SEVERE)   Status: Chronic   





(6) Diabetes mellitus


Code(s): E11.9 - TYPE 2 DIABETES MELLITUS WITHOUT COMPLICATIONS   Status: 

Chronic   





- Plan


continue antibiotics, PT/OT, , out of bed/ambulate, DVT proph w/

SCDs





Continue Vancomycin/Rocephin with PICC line placed 9/16/19


Saline lock IVF's


Hold oral hypoglycemics until po intake more consistent


OOB/PT


D/C to [place] in 24h

## 2019-09-18 VITALS — DIASTOLIC BLOOD PRESSURE: 83 MMHG | SYSTOLIC BLOOD PRESSURE: 129 MMHG

## 2019-09-18 VITALS — TEMPERATURE: 98.1 F

## 2019-09-18 NOTE — DIS
DATE OF ADMISSION:  09/11/2019



DATE OF DISCHARGE:  09/18/2019



DISCHARGE DIAGNOSES:  

1. Hepatic abscess.

2. Sepsis secondary to #1 with methicillin-resistant Staphylococcus epidermidis.

3. Hypotension, resolved.

4. Acute kidney injury, resolved.

5. Chronic kidney disease stage 4, stable.

6. Diabetes mellitus type 2, stable.

7. Deconditioning.



CONSULTATIONS:  Dr. Jay Espitia with Infectious Disease Service.



PERTINENT LAB AND X-RAY FINDINGS:  Creatinine ranged between 1.70 to 2.42, estimated

GFR ranged between 20 to 30, lactic acid level ranged between 2.8 to 2.9, alkaline

phosphatase ranged between 231 to 291, albumin 2.5, TSH 0.105, free T4 level 0.86.

CBC showed a white blood cell count ranged between 14.1 to 26.4, hemoglobin ranged

between 8.9 to 10.8.  Vancomycin trough 13.7 on 09/17/2019.  Blood cultures x2 dated

on 09/11/2019 showed methicillin-resistant Staphylococcus epidermidis.  Urine

culture dated on 09/11/2019 showed no growth at 48 hours.  CT of the abdomen and

pelvis dated on 09/11/2019 showed mass of the left lobe of the liver.  Abdominal

ultrasound dated on 09/11/2019 showed hypoechoic mass in the left hepatic lobe.  CT

of the liver with percutaneous biopsy showed attempted aspiration and drainage

without significant fluid or aspiration.  Bilateral renal ultrasound dated on

09/12/2019 showed suboptimal assessment of the kidneys.  2D transthoracic

echocardiogram dated on 09/16/2019 showed ejection fraction of 50% to 55%.  Mild

biatrial enlargement.  Mild mitral and tricuspid valve regurgitation. 



HOSPITAL COURSE:  The patient was initially admitted after presenting with syncopal

episode with associated hypotension and generalized weakness.  The patient with

cramping abdominal pain, undergoing evaluation including CT of the abdomen and

pelvis showing evidence of a hepatic mass in the left lobe.  The patient underwent

ultrasound evaluation confirming the presence of the mass proceeding to CT-guided

percutaneous biopsy of the site.  No specific fluid was aspirated; however, the

patient did have 2/2 blood cultures positive for methicillin-resistant

Staphylococcus epidermidis.  The patient was treated for sepsis initially with

broad-spectrum IV antibiotic therapy.  The patient was continued on vancomycin and

Zosyn.  However, Infectious Disease consult was obtained due to the positive blood

culture results.  After review of the organisms identified, the patient was placed

on Rocephin 2 g daily in addition to vancomycin.  Recommendations per Infectious

Disease Service were to place a PICC line and to continue IV antibiotic therapy for

approximately 6 weeks.  Clinical status was unclear as to the significance of the

MRSE as a dominant organism or a contaminant.  Given the patient's presentation and

sepsis, the patient was treated aggressively with IV antibiotic therapy.  The

patient tolerated the IV antibiotic therapy and exhibited stable vital signs.  The

patient tolerated regular oral intake and ambulated with a rolling walker during the

hospital course.  I have examined the patient at the time of discharge and discussed

followup instructions.  The patient verbalized understanding and agreement and ready

for discharge on 09/18/2019. 



DISCHARGE MEDICATIONS:  

1. Ventolin HFA 2 puffs inhaled q.6 hours p.r.n.

2. Amitriptyline 25 mg p.o. daily.

3. Lipitor 40 mg p.o. daily.

4. Calcitriol 0.5 mcg p.o. daily.

5. Aricept 5 mg p.o. at bedtime.

6. Gabapentin 300 mg p.o. b.i.d.

7. Glipizide 5 mg p.o. daily.

8. Levothyroxine 100 mcg p.o. daily.

9. Lisinopril 2.5 mg p.o. daily.

10. Memantine extended release 14 mg p.o. daily.

11. Protonix 20 mg p.o. daily.

12. Tramadol 50 mg p.o. at bedtime p.r.n.

13. Rocephin 2 g IV q.24 hours until 10/30/2019.

14. Vancomycin 500 mg IV q.24 hours until 10/30/2019.

15. Lasix 20 mg p.o. daily.



FOLLOWUP:  The patient to follow up with Dr. Jay Espitia in 2 to 3 weeks after

discharge.  The patient will follow up with her primary care provider, Dr. Burns. 



SPECIAL INSTRUCTIONS:  Weekly CBC, CMP, CRP, vancomycin trough.  Recommend repeat CT

imaging of the liver within 3 weeks of discharge. 



CONDITION ON DISCHARGE:  Fair.



ACTIVITY:  Ad rebeca.  Rolling walker for ambulation.



DIET:  ADA.



CODE STATUS:  Full.



DISPOSITION:  Discharged to [facility] on 09/18/2019.



TIME SPENT:  Total time preparing and coordinating discharge, 37 minutes.







Job ID:  [id number]

## 2019-09-23 ENCOUNTER — HOSPITAL ENCOUNTER (INPATIENT)
Dept: HOSPITAL 9 - MADMS | Age: 69
LOS: 18 days | Discharge: ANOTHER HOSPITAL | DRG: 871 | End: 2019-10-11
Attending: FAMILY MEDICINE | Admitting: FAMILY MEDICINE
Payer: MEDICARE

## 2019-09-23 VITALS — BODY MASS INDEX: 43.1 KG/M2

## 2019-09-23 DIAGNOSIS — A41.02: Primary | ICD-10-CM

## 2019-09-23 DIAGNOSIS — E11.22: ICD-10-CM

## 2019-09-23 DIAGNOSIS — E27.9: ICD-10-CM

## 2019-09-23 DIAGNOSIS — R93.89: ICD-10-CM

## 2019-09-23 DIAGNOSIS — K86.9: ICD-10-CM

## 2019-09-23 DIAGNOSIS — J44.9: ICD-10-CM

## 2019-09-23 DIAGNOSIS — E66.9: ICD-10-CM

## 2019-09-23 DIAGNOSIS — I50.32: ICD-10-CM

## 2019-09-23 DIAGNOSIS — K75.0: ICD-10-CM

## 2019-09-23 DIAGNOSIS — I13.0: ICD-10-CM

## 2019-09-23 DIAGNOSIS — F03.90: ICD-10-CM

## 2019-09-23 DIAGNOSIS — Z79.899: ICD-10-CM

## 2019-09-23 DIAGNOSIS — F32.9: ICD-10-CM

## 2019-09-23 DIAGNOSIS — Z96.652: ICD-10-CM

## 2019-09-23 DIAGNOSIS — R53.81: ICD-10-CM

## 2019-09-23 DIAGNOSIS — R53.1: ICD-10-CM

## 2019-09-23 DIAGNOSIS — N18.4: ICD-10-CM

## 2019-09-23 DIAGNOSIS — M10.9: ICD-10-CM

## 2019-09-23 DIAGNOSIS — K31.4: ICD-10-CM

## 2019-09-23 PROCEDURE — 85025 COMPLETE CBC W/AUTO DIFF WBC: CPT

## 2019-09-23 PROCEDURE — 81001 URINALYSIS AUTO W/SCOPE: CPT

## 2019-09-23 PROCEDURE — 83880 ASSAY OF NATRIURETIC PEPTIDE: CPT

## 2019-09-23 PROCEDURE — 84550 ASSAY OF BLOOD/URIC ACID: CPT

## 2019-09-23 PROCEDURE — 80048 BASIC METABOLIC PNL TOTAL CA: CPT

## 2019-09-23 PROCEDURE — 80053 COMPREHEN METABOLIC PANEL: CPT

## 2019-09-23 PROCEDURE — 86140 C-REACTIVE PROTEIN: CPT

## 2019-09-23 PROCEDURE — 76856 US EXAM PELVIC COMPLETE: CPT

## 2019-09-23 PROCEDURE — 74178 CT ABD&PLV WO CNTR FLWD CNTR: CPT

## 2019-09-23 PROCEDURE — 80202 ASSAY OF VANCOMYCIN: CPT

## 2019-09-23 PROCEDURE — 36416 COLLJ CAPILLARY BLOOD SPEC: CPT

## 2019-09-23 PROCEDURE — 36415 COLL VENOUS BLD VENIPUNCTURE: CPT

## 2019-09-23 RX ADMIN — CEFTRIAXONE SCH MLS: 2 INJECTION, POWDER, FOR SOLUTION INTRAMUSCULAR; INTRAVENOUS at 20:03

## 2019-09-23 RX ADMIN — Medication SCH ML: at 19:55

## 2019-09-24 LAB
ALBUMIN SERPL BCG-MCNC: 2.5 G/DL (ref 3.4–4.8)
ALP SERPL-CCNC: 160 U/L (ref 40–150)
ALT SERPL W P-5'-P-CCNC: 8 U/L (ref 8–55)
ANION GAP SERPL CALC-SCNC: 17 MMOL/L (ref 10–20)
AST SERPL-CCNC: 17 U/L (ref 5–34)
BASOPHILS # BLD AUTO: 0.1 THOU/UL (ref 0–0.2)
BASOPHILS NFR BLD AUTO: 0.4 % (ref 0–1)
BILIRUB SERPL-MCNC: 0.3 MG/DL (ref 0.2–1.2)
BUN SERPL-MCNC: 17 MG/DL (ref 9.8–20.1)
CALCIUM SERPL-MCNC: 8 MG/DL (ref 7.8–10.44)
CHLORIDE SERPL-SCNC: 96 MMOL/L (ref 98–107)
CO2 SERPL-SCNC: 28 MMOL/L (ref 23–31)
CREAT CL PREDICTED SERPL C-G-VRATE: 48 ML/MIN (ref 70–130)
CRP SERPL-MCNC: 13.23 MG/DL
EOSINOPHIL # BLD AUTO: 0.9 THOU/UL (ref 0–0.7)
EOSINOPHIL NFR BLD AUTO: 6.6 % (ref 0–10)
GLOBULIN SER CALC-MCNC: 3.2 G/DL (ref 2.4–3.5)
GLUCOSE SERPL-MCNC: 93 MG/DL (ref 80–115)
HGB BLD-MCNC: 8.1 G/DL (ref 12–16)
LYMPHOCYTES # BLD AUTO: 2.5 THOU/UL (ref 1.2–3.4)
LYMPHOCYTES NFR BLD AUTO: 18.9 % (ref 21–51)
MCH RBC QN AUTO: 26.5 PG (ref 27–31)
MCV RBC AUTO: 82.3 FL (ref 78–98)
MONOCYTES # BLD AUTO: 1.1 THOU/UL (ref 0.11–0.59)
MONOCYTES NFR BLD AUTO: 8.5 % (ref 0–10)
NEUTROPHILS # BLD AUTO: 8.7 THOU/UL (ref 1.4–6.5)
NEUTROPHILS NFR BLD AUTO: 65.6 % (ref 42–75)
PLATELET # BLD AUTO: 206 THOU/UL (ref 130–400)
POTASSIUM SERPL-SCNC: 2.9 MMOL/L (ref 3.5–5.1)
RBC # BLD AUTO: 3.05 MILL/UL (ref 4.2–5.4)
SODIUM SERPL-SCNC: 138 MMOL/L (ref 136–145)
URATE SERPL-MCNC: 10.6 MG/DL (ref 2.6–6)
WBC # BLD AUTO: 13.3 THOU/UL (ref 4.8–10.8)

## 2019-09-24 RX ADMIN — Medication SCH ML: at 20:29

## 2019-09-24 RX ADMIN — CEFTRIAXONE SCH MLS: 2 INJECTION, POWDER, FOR SOLUTION INTRAMUSCULAR; INTRAVENOUS at 19:37

## 2019-09-24 RX ADMIN — Medication SCH ML: at 09:15

## 2019-09-24 RX ADMIN — ALBUTEROL SULFATE PRN INH: 90 AEROSOL, METERED RESPIRATORY (INHALATION) at 16:38

## 2019-09-24 RX ADMIN — Medication PRN ML: at 19:41

## 2019-09-24 NOTE — HP
ADMISSION DIAGNOSIS:  Acute rehabilitation and long-term IV antibiotics status 
post

sepsis, deconditioning, and hepatic abscesses. 



HISTORY OF PRESENT ILLNESS:  Ms. Gomez is a 69-year-old  female who

presented to the emergency room via EMS on September 11, 2019.  The patient 
slumped

in her seat on bus on her way to the doctor's office.  EMS reported she was 
hypotensive at

scene. Patient reported generalized weakness. The patient complained of severe

cramping in the abdomen and underwent a CT of the abdomen and pelvis which 
showed

hepatic masses to the left lobe.  The patient underwent ultrasound evaluation

with CT-guided percutaneous biopsy of the site.  No specific fluid was able to

be aspirated.  However, the patient did have 2/2 positive blood cultures for

methicillin-resistant Staphylococcus epidermitis.  She was initially treated 
with

broad-spectrum IV antibiotic and continued on vancomycin and Zosyn.  She was 
seen by

ID specialist, Dr. Espitia, who recommended placing the patient on 2 g of Rocephin

daily for 6 weeks in addition to vancomycin daily for 6 weeks.  Given the 
patient's

presentation of sepsis, the patient was treated aggressively with IV fluids and 
IV

antibiotics and she progressively improved. Due to sepsis, the patient was 
noted to

be deconditioned and was able to ambulate with a rolling walker. The decision 
was

made to discharge the patient to [facility] and the 
patient

is to follow up with Dr. Espitia and primary care physician.  Once the patient 
got to

[facility], she was not pleased with the place and decided to

transfer and continue IV long-term antibiotic at [facility]. 



Upon evaluation of the patient today, she was excited to be facility. She 
complained

of right big toe severe pain.  She denies any history of gout.  She stated the 
pain

started yesterday and great toe became very swollen and erythematous.  The 
patient

states she is not able to stand on foot due to pain and she is not able to 
ambulate,

but she is excited to be able to start therapy once pain to lower extremity

resolves.  The patient states she is unable to take Tylenol No. 3 or 4 due to 
nausea

episodes while on medicine. 



PAST MEDICAL HISTORY:  Dementia, chronic kidney disease, type 2 diabetes,

hypertension, hypothyroidism, chronic diastolic heart failure, COPD, obesity,

depression, cervical radiculopathy. 



PAST SURGICAL HISTORY:  Left total knee replacement.



SOCIAL HISTORY:  Denies alcohol or illicit drug use.  Uses a walker for 
ambulation.



ALLERGIES:  NO KNOWN DRUG ALLERGIES, BUT POSITIVE ALLERGY TO TAPE.



MEDICATIONS:  

1. Ventolin 2 puffs q.6 p.r.n.

2. Amitriptyline 25 daily.

3. Lipitor 40 daily.

4. Calcitriol  0.5 mcg 1 daily.

5. Aricept 5 mg at bedtime.

6. Gabapentin 300 b.i.d.

7. Glipizide 5 mg daily.

8. Levothyroxine 100 mcg daily.

9. Lisinopril 2.5 daily.

10. Memantine extended release 14 daily.

11. Protonix 20 daily.

12. Tramadol 50 at bedtime p.r.n.

13. Rocephin 2 g IV q.24 hours until October 30, 2019.

14. Vancomycin 500 mg IV daily until October 30, 2019.

15. Lasix 20 mg daily.



REVIEW OF SYSTEMS:  GENERAL: The patient complains of weakness.  The patient

complains of lower extremity right toe pain.  The patient complains of unable to

ambulate due to severe pain.  The patient denies chest pain.  Denies shortness 
of

breath.  Denies palpitations.  Denies nausea or vomiting.  Denies headache.  
Denies

dizziness.  All other systems were reviewed and were negative unless mentioned 
in H

and P. 



PHYSICAL EXAMINATION:

VITAL SIGNS: Temperature 98.8, pulse 96, respirations 20, blood pressure 113/56
, O2

saturation 95% on room air. 

GENERAL:  The patient is alert, awake, oriented x3, lying in bed, in mild 
apparent

distress due to right toe pain. 

HEENT:  Normocephalic, atraumatic.  Pupils are round, equal, reactive to light.

Conjunctivae are clear.  ENT, mouth normal.  Mucous membranes are moist. 

NECK: Trachea is midline.  Normal range of motion.  Supple.  No JVD. 

RESPIRATION: Clear to auscultation bilaterally.  No respiratory distress.  No

wheezing.  No coughing.  CARDIOVASCULAR: Heart sounds regular rate and rhythm, 
S1

and S2. 

ABDOMEN:  Positive bowel sounds.  Nontender and nondistended.  No guarding, no

rebound. 

EXTREMITIES:  Right great toe, erythematous, swollen, tender to touch.  Calf, no

edema bilaterally. 

PSYCH:  Flat affect. 

NEURO:  The patient is alert, awake, oriented x3.  No focal deficits noted.



ASSESSMENT:  

1. Hepatic abscesses.

2. Sepsis secondary to #1 with methicillin-resistant Staphylococcus epidermitis.

3. Chronic kidney disease, stage 4.

4. Diabetes type 2.

5. Physical deconditioning.



PLAN:  The patient is a 69-year-old  female who is currently admitted 
for

physical debility, on long-time IV antibiotics.  We will consult Physical 
Therapy

for gait strengthening.  We will consult Occupational Therapy to help with

activities of daily living.  We will continue IV Rocephin and IV vancomycin x6 
weeks

per recommendation of ID specialist.  We will get weekly CBC, CMP, and CRP, and

vancomycin trough.  We will repeat CT of the liver within 3 weeks per ID

recommendation. We will check uric acid due to great toe pain and treat 
accordingly. No NSAIDS due to CKD

 The patient to follow up with Dr. Espitia in 3 weeks.  The patient is

to follow up with the primary care physician after discharge from skilled

rehabilitation.  We will place the patient on SCD for DVT prophylaxis and 
Pepcid for

GI prophylaxis.  We will monitor the patient closely for any hemodynamic

instabilities.  We will place the patient on Accu-Cheks before meals and at 
bedtime

and continue home diabetes medication. 



CODE STATUS:  The patient is a full code.



DISPOSITION:  Back to home.



ESTIMATED LENGTH OF STAY:  Six weeks until completion of IV antibiotics.







Job ID:  [id number]



[facility]

## 2019-09-25 LAB
ANION GAP SERPL CALC-SCNC: 20 MMOL/L (ref 10–20)
BUN SERPL-MCNC: 24 MG/DL (ref 9.8–20.1)
CALCIUM SERPL-MCNC: 8.6 MG/DL (ref 7.8–10.44)
CHLORIDE SERPL-SCNC: 95 MMOL/L (ref 98–107)
CO2 SERPL-SCNC: 23 MMOL/L (ref 23–31)
CREAT CL PREDICTED SERPL C-G-VRATE: 41 ML/MIN (ref 70–130)
GLUCOSE SERPL-MCNC: 124 MG/DL (ref 80–115)
POTASSIUM SERPL-SCNC: 4.4 MMOL/L (ref 3.5–5.1)
SODIUM SERPL-SCNC: 134 MMOL/L (ref 136–145)

## 2019-09-25 RX ADMIN — ALBUTEROL SULFATE PRN INH: 90 AEROSOL, METERED RESPIRATORY (INHALATION) at 16:45

## 2019-09-25 RX ADMIN — Medication PRN ML: at 21:08

## 2019-09-25 RX ADMIN — CEFTRIAXONE SCH MLS: 2 INJECTION, POWDER, FOR SOLUTION INTRAMUSCULAR; INTRAVENOUS at 20:27

## 2019-09-25 RX ADMIN — Medication SCH ML: at 08:12

## 2019-09-25 RX ADMIN — Medication SCH ML: at 20:27

## 2019-09-26 LAB — VANCOMYCIN TROUGH SERPL-MCNC: 14.1 UG/ML

## 2019-09-26 RX ADMIN — ALBUTEROL SULFATE PRN INH: 90 AEROSOL, METERED RESPIRATORY (INHALATION) at 16:57

## 2019-09-26 RX ADMIN — Medication SCH ML: at 20:11

## 2019-09-26 RX ADMIN — CEFTRIAXONE SCH MLS: 2 INJECTION, POWDER, FOR SOLUTION INTRAMUSCULAR; INTRAVENOUS at 20:06

## 2019-09-26 RX ADMIN — Medication SCH ML: at 08:26

## 2019-09-27 RX ADMIN — Medication SCH ML: at 20:15

## 2019-09-27 RX ADMIN — Medication SCH ML: at 08:09

## 2019-09-27 RX ADMIN — CEFTRIAXONE SCH MLS: 2 INJECTION, POWDER, FOR SOLUTION INTRAMUSCULAR; INTRAVENOUS at 20:14

## 2019-09-28 LAB — VANCOMYCIN TROUGH SERPL-MCNC: 12.7 UG/ML

## 2019-09-28 RX ADMIN — ALUMINUM HYDROXIDE, MAGNESIUM HYDROXIDE, AND SIMETHICONE PRN ML: 200; 200; 20 SUSPENSION ORAL at 13:12

## 2019-09-28 RX ADMIN — Medication SCH ML: at 08:33

## 2019-09-28 RX ADMIN — ALBUTEROL SULFATE PRN INH: 90 AEROSOL, METERED RESPIRATORY (INHALATION) at 13:11

## 2019-09-28 RX ADMIN — Medication SCH ML: at 20:32

## 2019-09-28 RX ADMIN — Medication PRN ML: at 21:23

## 2019-09-28 RX ADMIN — CEFTRIAXONE SCH MLS: 2 INJECTION, POWDER, FOR SOLUTION INTRAMUSCULAR; INTRAVENOUS at 20:31

## 2019-09-29 RX ADMIN — ALBUTEROL SULFATE PRN INH: 90 AEROSOL, METERED RESPIRATORY (INHALATION) at 18:17

## 2019-09-29 RX ADMIN — VANCOMYCIN HYDROCHLORIDE SCH MLS: 750 INJECTION, POWDER, LYOPHILIZED, FOR SOLUTION INTRAVENOUS at 20:12

## 2019-09-29 RX ADMIN — CEFTRIAXONE SCH MLS: 2 INJECTION, POWDER, FOR SOLUTION INTRAMUSCULAR; INTRAVENOUS at 20:11

## 2019-09-29 RX ADMIN — Medication SCH ML: at 08:08

## 2019-09-29 RX ADMIN — Medication SCH ML: at 20:10

## 2019-09-30 RX ADMIN — CEFTRIAXONE SCH MLS: 2 INJECTION, POWDER, FOR SOLUTION INTRAMUSCULAR; INTRAVENOUS at 20:18

## 2019-09-30 RX ADMIN — ALBUTEROL SULFATE PRN INH: 90 AEROSOL, METERED RESPIRATORY (INHALATION) at 09:58

## 2019-09-30 RX ADMIN — Medication SCH ML: at 20:22

## 2019-09-30 RX ADMIN — VANCOMYCIN HYDROCHLORIDE SCH MLS: 750 INJECTION, POWDER, LYOPHILIZED, FOR SOLUTION INTRAVENOUS at 20:17

## 2019-09-30 RX ADMIN — Medication PRN ML: at 20:23

## 2019-09-30 RX ADMIN — Medication SCH ML: at 08:12

## 2019-09-30 NOTE — ULT
EXAM:

Transabdominal and transvaginal pelvic ultrasound



PROVIDED CLINICAL HISTORY:

Postmenopausal bleeding



COMPARISON:

None



FINDINGS:

The uterus measures approximately 9 x 3 x 5 cm. No focal myometrial abnormality is evident. There is 
thickening of the uterine endometrium, measuring at least 1 cm and possibly up to 1.5 cm. Nabothian

cysts are seen.



The ovaries are not identified. No evidence for free pelvic fluid.



IMPRESSION:

Abnormal thickening of the uterine endometrium for reported patient postmenopausal status, suspicious
 for endometrial carcinoma given the provided clinical history of vaginal bleeding. Correlation

with tissue sampling is recommended.



Reported By: Jefferson Li 

Electronically Signed:  9/30/2019 10:12 AM

## 2019-10-01 LAB
ALBUMIN SERPL BCG-MCNC: 2.7 G/DL (ref 3.4–4.8)
ALP SERPL-CCNC: 150 U/L (ref 40–110)
ALT SERPL W P-5'-P-CCNC: 11 U/L (ref 8–55)
ANION GAP SERPL CALC-SCNC: 16 MMOL/L (ref 10–20)
AST SERPL-CCNC: 16 U/L (ref 5–34)
BASOPHILS # BLD AUTO: 0.1 THOU/UL (ref 0–0.2)
BASOPHILS NFR BLD AUTO: 0.8 % (ref 0–1)
BILIRUB SERPL-MCNC: 0.4 MG/DL (ref 0.2–1.2)
BUN SERPL-MCNC: 31 MG/DL (ref 9.8–20.1)
CALCIUM SERPL-MCNC: 8.4 MG/DL (ref 7.8–10.44)
CHLORIDE SERPL-SCNC: 98 MMOL/L (ref 98–107)
CO2 SERPL-SCNC: 29 MMOL/L (ref 23–31)
CREAT CL PREDICTED SERPL C-G-VRATE: 58 ML/MIN (ref 70–130)
CRP SERPL-MCNC: 6.8 MG/DL
EOSINOPHIL # BLD AUTO: 0.9 THOU/UL (ref 0–0.7)
EOSINOPHIL NFR BLD AUTO: 7.6 % (ref 0–10)
GLOBULIN SER CALC-MCNC: 3.2 G/DL (ref 2.4–3.5)
GLUCOSE SERPL-MCNC: 97 MG/DL (ref 80–115)
HGB BLD-MCNC: 9.2 G/DL (ref 12–16)
LYMPHOCYTES # BLD AUTO: 2.5 THOU/UL (ref 1.2–3.4)
LYMPHOCYTES NFR BLD AUTO: 20.7 % (ref 21–51)
MCH RBC QN AUTO: 26.3 PG (ref 27–31)
MCV RBC AUTO: 84.2 FL (ref 78–98)
MONOCYTES # BLD AUTO: 0.7 THOU/UL (ref 0.11–0.59)
MONOCYTES NFR BLD AUTO: 6.2 % (ref 0–10)
NEUTROPHILS # BLD AUTO: 7.7 THOU/UL (ref 1.4–6.5)
NEUTROPHILS NFR BLD AUTO: 64.6 % (ref 42–75)
PLATELET # BLD AUTO: 198 THOU/UL (ref 130–400)
POTASSIUM SERPL-SCNC: 4.4 MMOL/L (ref 3.5–5.1)
RBC # BLD AUTO: 3.47 MILL/UL (ref 4.2–5.4)
SODIUM SERPL-SCNC: 139 MMOL/L (ref 136–145)
VANCOMYCIN TROUGH SERPL-MCNC: 18 UG/ML
WBC # BLD AUTO: 11.9 THOU/UL (ref 4.8–10.8)

## 2019-10-01 RX ADMIN — CEFTRIAXONE SCH MLS: 2 INJECTION, POWDER, FOR SOLUTION INTRAMUSCULAR; INTRAVENOUS at 20:06

## 2019-10-01 RX ADMIN — VANCOMYCIN HYDROCHLORIDE SCH MLS: 750 INJECTION, POWDER, LYOPHILIZED, FOR SOLUTION INTRAVENOUS at 21:26

## 2019-10-01 RX ADMIN — Medication SCH ML: at 21:32

## 2019-10-01 RX ADMIN — Medication SCH ML: at 08:07

## 2019-10-02 RX ADMIN — ALBUTEROL SULFATE PRN INH: 90 AEROSOL, METERED RESPIRATORY (INHALATION) at 09:24

## 2019-10-02 RX ADMIN — Medication SCH ML: at 09:26

## 2019-10-02 RX ADMIN — Medication SCH ML: at 19:49

## 2019-10-02 RX ADMIN — VANCOMYCIN HYDROCHLORIDE SCH MLS: 750 INJECTION, POWDER, LYOPHILIZED, FOR SOLUTION INTRAVENOUS at 21:10

## 2019-10-02 RX ADMIN — CEFTRIAXONE SCH MLS: 2 INJECTION, POWDER, FOR SOLUTION INTRAMUSCULAR; INTRAVENOUS at 19:47

## 2019-10-03 RX ADMIN — ALBUTEROL SULFATE PRN INH: 90 AEROSOL, METERED RESPIRATORY (INHALATION) at 08:23

## 2019-10-03 RX ADMIN — CEFTRIAXONE SCH MLS: 2 INJECTION, POWDER, FOR SOLUTION INTRAMUSCULAR; INTRAVENOUS at 19:39

## 2019-10-03 RX ADMIN — ALUMINUM HYDROXIDE, MAGNESIUM HYDROXIDE, AND SIMETHICONE PRN ML: 200; 200; 20 SUSPENSION ORAL at 10:32

## 2019-10-03 RX ADMIN — VANCOMYCIN HYDROCHLORIDE SCH MLS: 750 INJECTION, POWDER, LYOPHILIZED, FOR SOLUTION INTRAVENOUS at 20:54

## 2019-10-03 RX ADMIN — Medication SCH ML: at 20:55

## 2019-10-03 RX ADMIN — Medication SCH ML: at 08:25

## 2019-10-03 RX ADMIN — Medication PRN ML: at 19:51

## 2019-10-04 LAB — BACTERIA UR QL AUTO: (no result) HPF

## 2019-10-04 RX ADMIN — ALBUTEROL SULFATE PRN INH: 90 AEROSOL, METERED RESPIRATORY (INHALATION) at 13:53

## 2019-10-04 RX ADMIN — VANCOMYCIN HYDROCHLORIDE SCH MLS: 750 INJECTION, POWDER, LYOPHILIZED, FOR SOLUTION INTRAVENOUS at 21:17

## 2019-10-04 RX ADMIN — CEFTRIAXONE SCH MLS: 2 INJECTION, POWDER, FOR SOLUTION INTRAMUSCULAR; INTRAVENOUS at 17:40

## 2019-10-04 RX ADMIN — Medication PRN ML: at 21:02

## 2019-10-04 RX ADMIN — ALUMINUM HYDROXIDE, MAGNESIUM HYDROXIDE, AND SIMETHICONE PRN ML: 200; 200; 20 SUSPENSION ORAL at 17:47

## 2019-10-04 RX ADMIN — Medication SCH ML: at 21:02

## 2019-10-04 RX ADMIN — Medication SCH ML: at 08:03

## 2019-10-05 LAB — VANCOMYCIN TROUGH SERPL-MCNC: 17.3 UG/ML

## 2019-10-05 RX ADMIN — Medication PRN ML: at 21:49

## 2019-10-05 RX ADMIN — ALUMINUM HYDROXIDE, MAGNESIUM HYDROXIDE, AND SIMETHICONE PRN ML: 200; 200; 20 SUSPENSION ORAL at 10:17

## 2019-10-05 RX ADMIN — VANCOMYCIN HYDROCHLORIDE SCH MLS: 750 INJECTION, POWDER, LYOPHILIZED, FOR SOLUTION INTRAVENOUS at 21:48

## 2019-10-05 RX ADMIN — Medication PRN ML: at 17:10

## 2019-10-05 RX ADMIN — CEFTRIAXONE SCH MLS: 2 INJECTION, POWDER, FOR SOLUTION INTRAMUSCULAR; INTRAVENOUS at 17:08

## 2019-10-05 RX ADMIN — ALBUTEROL SULFATE PRN INH: 90 AEROSOL, METERED RESPIRATORY (INHALATION) at 13:07

## 2019-10-05 RX ADMIN — Medication SCH ML: at 08:16

## 2019-10-05 RX ADMIN — Medication SCH ML: at 21:54

## 2019-10-06 RX ADMIN — ALUMINUM HYDROXIDE, MAGNESIUM HYDROXIDE, AND SIMETHICONE PRN ML: 200; 200; 20 SUSPENSION ORAL at 13:07

## 2019-10-06 RX ADMIN — ALUMINUM HYDROXIDE, MAGNESIUM HYDROXIDE, AND SIMETHICONE PRN ML: 200; 200; 20 SUSPENSION ORAL at 19:42

## 2019-10-06 RX ADMIN — VANCOMYCIN HYDROCHLORIDE SCH MLS: 750 INJECTION, POWDER, LYOPHILIZED, FOR SOLUTION INTRAVENOUS at 20:33

## 2019-10-06 RX ADMIN — Medication SCH ML: at 08:27

## 2019-10-06 RX ADMIN — Medication SCH ML: at 20:27

## 2019-10-06 RX ADMIN — ALBUTEROL SULFATE PRN INH: 90 AEROSOL, METERED RESPIRATORY (INHALATION) at 14:24

## 2019-10-06 RX ADMIN — CEFTRIAXONE SCH MLS: 2 INJECTION, POWDER, FOR SOLUTION INTRAMUSCULAR; INTRAVENOUS at 17:07

## 2019-10-07 RX ADMIN — CEFTRIAXONE SCH MLS: 2 INJECTION, POWDER, FOR SOLUTION INTRAMUSCULAR; INTRAVENOUS at 16:56

## 2019-10-07 RX ADMIN — Medication SCH ML: at 08:26

## 2019-10-07 RX ADMIN — ALUMINUM HYDROXIDE, MAGNESIUM HYDROXIDE, AND SIMETHICONE PRN ML: 200; 200; 20 SUSPENSION ORAL at 10:54

## 2019-10-07 RX ADMIN — Medication SCH ML: at 20:25

## 2019-10-07 RX ADMIN — VANCOMYCIN HYDROCHLORIDE SCH MLS: 750 INJECTION, POWDER, LYOPHILIZED, FOR SOLUTION INTRAVENOUS at 20:24

## 2019-10-07 RX ADMIN — ALUMINUM HYDROXIDE, MAGNESIUM HYDROXIDE, AND SIMETHICONE PRN ML: 200; 200; 20 SUSPENSION ORAL at 13:33

## 2019-10-08 LAB
ALBUMIN SERPL BCG-MCNC: 3.2 G/DL (ref 3.4–4.8)
ALP SERPL-CCNC: 217 U/L (ref 40–110)
ALT SERPL W P-5'-P-CCNC: 12 U/L (ref 8–55)
ANION GAP SERPL CALC-SCNC: 16 MMOL/L (ref 10–20)
AST SERPL-CCNC: 19 U/L (ref 5–34)
BASOPHILS # BLD AUTO: 0.1 THOU/UL (ref 0–0.2)
BASOPHILS NFR BLD AUTO: 0.7 % (ref 0–1)
BILIRUB SERPL-MCNC: 0.4 MG/DL (ref 0.2–1.2)
BUN SERPL-MCNC: 20 MG/DL (ref 9.8–20.1)
CALCIUM SERPL-MCNC: 9.5 MG/DL (ref 7.8–10.44)
CHLORIDE SERPL-SCNC: 97 MMOL/L (ref 98–107)
CO2 SERPL-SCNC: 27 MMOL/L (ref 23–31)
CREAT CL PREDICTED SERPL C-G-VRATE: 48 ML/MIN (ref 70–130)
CRP SERPL-MCNC: 12.09 MG/DL
EOSINOPHIL # BLD AUTO: 0.8 THOU/UL (ref 0–0.7)
EOSINOPHIL NFR BLD AUTO: 6.8 % (ref 0–10)
GLOBULIN SER CALC-MCNC: 3.7 G/DL (ref 2.4–3.5)
GLUCOSE SERPL-MCNC: 147 MG/DL (ref 80–115)
HGB BLD-MCNC: 10.3 G/DL (ref 12–16)
LYMPHOCYTES # BLD AUTO: 2.1 THOU/UL (ref 1.2–3.4)
LYMPHOCYTES NFR BLD AUTO: 19 % (ref 21–51)
MCH RBC QN AUTO: 26.6 PG (ref 27–31)
MCV RBC AUTO: 83.3 FL (ref 78–98)
MONOCYTES # BLD AUTO: 0.7 THOU/UL (ref 0.11–0.59)
MONOCYTES NFR BLD AUTO: 5.8 % (ref 0–10)
NEUTROPHILS # BLD AUTO: 7.6 THOU/UL (ref 1.4–6.5)
NEUTROPHILS NFR BLD AUTO: 67.7 % (ref 42–75)
PLATELET # BLD AUTO: 168 THOU/UL (ref 130–400)
POTASSIUM SERPL-SCNC: 4.3 MMOL/L (ref 3.5–5.1)
RBC # BLD AUTO: 3.87 MILL/UL (ref 4.2–5.4)
SODIUM SERPL-SCNC: 136 MMOL/L (ref 136–145)
WBC # BLD AUTO: 11.2 THOU/UL (ref 4.8–10.8)

## 2019-10-08 RX ADMIN — Medication SCH ML: at 20:28

## 2019-10-08 RX ADMIN — CEFTRIAXONE SCH MLS: 2 INJECTION, POWDER, FOR SOLUTION INTRAMUSCULAR; INTRAVENOUS at 17:00

## 2019-10-08 RX ADMIN — VANCOMYCIN HYDROCHLORIDE SCH MLS: 750 INJECTION, POWDER, LYOPHILIZED, FOR SOLUTION INTRAVENOUS at 20:29

## 2019-10-08 RX ADMIN — Medication SCH ML: at 08:06

## 2019-10-08 RX ADMIN — ALBUTEROL SULFATE PRN INH: 90 AEROSOL, METERED RESPIRATORY (INHALATION) at 08:04

## 2019-10-08 RX ADMIN — Medication PRN ML: at 17:00

## 2019-10-09 RX ADMIN — CEFTRIAXONE SCH MLS: 2 INJECTION, POWDER, FOR SOLUTION INTRAMUSCULAR; INTRAVENOUS at 17:09

## 2019-10-09 RX ADMIN — Medication SCH ML: at 20:52

## 2019-10-09 RX ADMIN — Medication PRN ML: at 17:10

## 2019-10-09 RX ADMIN — ALBUTEROL SULFATE PRN INH: 90 AEROSOL, METERED RESPIRATORY (INHALATION) at 08:05

## 2019-10-09 RX ADMIN — Medication SCH ML: at 08:05

## 2019-10-09 RX ADMIN — VANCOMYCIN HYDROCHLORIDE SCH MLS: 750 INJECTION, POWDER, LYOPHILIZED, FOR SOLUTION INTRAVENOUS at 20:52

## 2019-10-09 RX ADMIN — HYDROCODONE BITARTRATE AND ACETAMINOPHEN PRN TAB: 10; 325 TABLET ORAL at 20:59

## 2019-10-10 RX ADMIN — CEFTRIAXONE SCH MLS: 2 INJECTION, POWDER, FOR SOLUTION INTRAMUSCULAR; INTRAVENOUS at 17:21

## 2019-10-10 RX ADMIN — Medication SCH ML: at 20:51

## 2019-10-10 RX ADMIN — HYDROCODONE BITARTRATE AND ACETAMINOPHEN PRN TAB: 10; 325 TABLET ORAL at 12:23

## 2019-10-10 RX ADMIN — Medication PRN ML: at 17:26

## 2019-10-10 RX ADMIN — Medication SCH ML: at 08:36

## 2019-10-10 RX ADMIN — VANCOMYCIN HYDROCHLORIDE SCH MLS: 750 INJECTION, POWDER, LYOPHILIZED, FOR SOLUTION INTRAVENOUS at 20:43

## 2019-10-10 NOTE — CT
Abdomen CT scan with and without IV contrast:



HISTORY:

Liver abscess, abdominal pain



COMPARISON:

9/11/2019 CT-guided liver biopsy, 9/12/2019



FINDINGS:

Several millimeter diameter nodular focus in the right lower lobe possibly just vascular.



Again noted is a large poorly circumscribed somewhat multilobulated low-attenuation mass replacing mu
ch of the left lobe the liver now measuring 8.8 x 111.7 cm. This is little change from the prior

study. Enlarged left adrenal gland now measuring 2.7 x 2.9 cm where it previously measured 1.9 x 2.4 
cm. Enlarged abnormal right adrenal gland now measuring 2.1 x 2.9 cm where it previously measured

1.6 x 2.4 cm. There is opacity at 3.7 cm diameter low-attenuation focus between the posterior fundus 
of the stomach and the left adrenal gland and superior to the pancreas, this could represent a

gastric fundal diverticulum or possibly some other type of abnormal low-attenuation mass. There is ag
ain noted to be some intraductal air within the liver. Evidence for an approximately 1 cm diameter

hyperdense probably hemorrhagic cyst of the lateral aspect of the visualized upper kidney. Evidence f
or an umbilical hernia.



IMPRESSION:

Overall large stable left lobe of liver mass.

Bilateral adrenal masses, slightly larger than prior study.

Evidence for either a posterior gastric fundal diverticulum or a low-attenuation mass between the fun
dus of the stomach, pancreas, and left adrenal gland.

Probable small hemorrhagic cyst of the left kidney.



Reported By: Bipin Ortiz 

Electronically Signed:  10/10/2019 10:53 AM

## 2019-10-11 ENCOUNTER — HOSPITAL ENCOUNTER (INPATIENT)
Dept: HOSPITAL 92 - ERS | Age: 69
LOS: 6 days | Discharge: NURSING, REHAB OR LONG-TERM CARE | DRG: 435 | End: 2019-10-17
Attending: INTERNAL MEDICINE | Admitting: INTERNAL MEDICINE
Payer: MEDICARE

## 2019-10-11 VITALS — DIASTOLIC BLOOD PRESSURE: 60 MMHG | SYSTOLIC BLOOD PRESSURE: 138 MMHG

## 2019-10-11 VITALS — TEMPERATURE: 97.1 F

## 2019-10-11 VITALS — BODY MASS INDEX: 43.2 KG/M2

## 2019-10-11 DIAGNOSIS — N17.9: ICD-10-CM

## 2019-10-11 DIAGNOSIS — E78.5: ICD-10-CM

## 2019-10-11 DIAGNOSIS — R53.81: ICD-10-CM

## 2019-10-11 DIAGNOSIS — Z96.653: ICD-10-CM

## 2019-10-11 DIAGNOSIS — I10: ICD-10-CM

## 2019-10-11 DIAGNOSIS — E66.9: ICD-10-CM

## 2019-10-11 DIAGNOSIS — I13.0: ICD-10-CM

## 2019-10-11 DIAGNOSIS — I50.30: ICD-10-CM

## 2019-10-11 DIAGNOSIS — R63.0: ICD-10-CM

## 2019-10-11 DIAGNOSIS — E27.9: ICD-10-CM

## 2019-10-11 DIAGNOSIS — G93.41: ICD-10-CM

## 2019-10-11 DIAGNOSIS — E11.22: ICD-10-CM

## 2019-10-11 DIAGNOSIS — N18.4: ICD-10-CM

## 2019-10-11 DIAGNOSIS — E87.1: ICD-10-CM

## 2019-10-11 DIAGNOSIS — C22.9: Primary | ICD-10-CM

## 2019-10-11 DIAGNOSIS — J44.9: ICD-10-CM

## 2019-10-11 LAB
ALBUMIN SERPL BCG-MCNC: 3.3 G/DL (ref 3.4–4.8)
ALP SERPL-CCNC: 211 U/L (ref 40–110)
ALT SERPL W P-5'-P-CCNC: 11 U/L (ref 8–55)
ANION GAP SERPL CALC-SCNC: 17 MMOL/L (ref 10–20)
AST SERPL-CCNC: 21 U/L (ref 5–34)
BACTERIA UR QL AUTO: (no result) HPF
BASOPHILS # BLD AUTO: 0 THOU/UL (ref 0–0.2)
BASOPHILS NFR BLD AUTO: 0.2 % (ref 0–1)
BILIRUB SERPL-MCNC: 0.4 MG/DL (ref 0.2–1.2)
BUN SERPL-MCNC: 22 MG/DL (ref 9.8–20.1)
CALCIUM SERPL-MCNC: 10 MG/DL (ref 7.8–10.44)
CHLORIDE SERPL-SCNC: 93 MMOL/L (ref 98–107)
CO2 SERPL-SCNC: 26 MMOL/L (ref 23–31)
CREAT CL PREDICTED SERPL C-G-VRATE: 0 ML/MIN (ref 70–130)
EOSINOPHIL # BLD AUTO: 0.7 THOU/UL (ref 0–0.7)
EOSINOPHIL NFR BLD AUTO: 5.1 % (ref 0–10)
GLOBULIN SER CALC-MCNC: 3.9 G/DL (ref 2.4–3.5)
GLUCOSE SERPL-MCNC: 89 MG/DL (ref 80–115)
HGB BLD-MCNC: 10.6 G/DL (ref 12–16)
LIPASE SERPL-CCNC: 21 U/L (ref 8–78)
LYMPHOCYTES # BLD: 2.5 THOU/UL (ref 1.2–3.4)
LYMPHOCYTES NFR BLD AUTO: 17.6 % (ref 21–51)
MCH RBC QN AUTO: 26.9 PG (ref 27–31)
MCV RBC AUTO: 85.9 FL (ref 78–98)
MONOCYTES # BLD AUTO: 0.9 THOU/UL (ref 0.11–0.59)
MONOCYTES NFR BLD AUTO: 6.2 % (ref 0–10)
NEUTROPHILS # BLD AUTO: 10.2 THOU/UL (ref 1.4–6.5)
NEUTROPHILS NFR BLD AUTO: 70.9 % (ref 42–75)
PLATELET # BLD AUTO: 204 THOU/UL (ref 130–400)
POTASSIUM SERPL-SCNC: 4.5 MMOL/L (ref 3.5–5.1)
PROT UR STRIP.AUTO-MCNC: 30 MG/DL
RBC # BLD AUTO: 3.95 MILL/UL (ref 4.2–5.4)
RBC UR QL AUTO: (no result) HPF (ref 0–3)
SODIUM SERPL-SCNC: 131 MMOL/L (ref 136–145)
VANCOMYCIN TROUGH SERPL-MCNC: 25.1 UG/ML
WBC # BLD AUTO: 14.4 THOU/UL (ref 4.8–10.8)
WBC UR QL AUTO: (no result) HPF (ref 0–3)

## 2019-10-11 PROCEDURE — 83605 ASSAY OF LACTIC ACID: CPT

## 2019-10-11 PROCEDURE — 94760 N-INVAS EAR/PLS OXIMETRY 1: CPT

## 2019-10-11 PROCEDURE — 82378 CARCINOEMBRYONIC ANTIGEN: CPT

## 2019-10-11 PROCEDURE — 80202 ASSAY OF VANCOMYCIN: CPT

## 2019-10-11 PROCEDURE — 85025 COMPLETE CBC W/AUTO DIFF WBC: CPT

## 2019-10-11 PROCEDURE — 86301 IMMUNOASSAY TUMOR CA 19-9: CPT

## 2019-10-11 PROCEDURE — 84484 ASSAY OF TROPONIN QUANT: CPT

## 2019-10-11 PROCEDURE — 81015 MICROSCOPIC EXAM OF URINE: CPT

## 2019-10-11 PROCEDURE — 36416 COLLJ CAPILLARY BLOOD SPEC: CPT

## 2019-10-11 PROCEDURE — 51701 INSERT BLADDER CATHETER: CPT

## 2019-10-11 PROCEDURE — 36415 COLL VENOUS BLD VENIPUNCTURE: CPT

## 2019-10-11 PROCEDURE — 80053 COMPREHEN METABOLIC PANEL: CPT

## 2019-10-11 PROCEDURE — 83690 ASSAY OF LIPASE: CPT

## 2019-10-11 PROCEDURE — 80048 BASIC METABOLIC PNL TOTAL CA: CPT

## 2019-10-11 PROCEDURE — 81003 URINALYSIS AUTO W/O SCOPE: CPT

## 2019-10-11 PROCEDURE — 87040 BLOOD CULTURE FOR BACTERIA: CPT

## 2019-10-11 PROCEDURE — 82105 ALPHA-FETOPROTEIN SERUM: CPT

## 2019-10-11 PROCEDURE — 94640 AIRWAY INHALATION TREATMENT: CPT

## 2019-10-11 PROCEDURE — 93005 ELECTROCARDIOGRAM TRACING: CPT

## 2019-10-11 RX ADMIN — Medication SCH: at 10:54

## 2019-10-11 RX ADMIN — CEFTRIAXONE SCH MLS: 2 INJECTION, POWDER, FOR SOLUTION INTRAMUSCULAR; INTRAVENOUS at 17:27

## 2019-10-11 RX ADMIN — CEFTRIAXONE SCH: 2 INJECTION, POWDER, FOR SOLUTION INTRAMUSCULAR; INTRAVENOUS at 17:15

## 2019-10-11 NOTE — DIS
DATE OF ADMISSION:  09/23/2019



DATE OF DISCHARGE:  10/11/2019



PRIMARY CARE PHYSICIAN:  Out of town.



DISCHARGE DISPOSITION:  The patient was transferred to [facility] in [place] for

advanced level of care. 



DISCHARGE MEDICATIONS:  

1. Amitriptyline 25 daily.

2. Lipitor 40 daily.

3. Calcitriol 0.5 mcg daily.

4. Aricept 5 mg daily.

5. Gabapentin 300 b.i.d.

6. Glipizide 5 daily.

7. Levothyroxine 100 mcg daily.

8. Lisinopril 2.5 daily.

9. Vancomycin 500 daily.

10. Rocephin 2 g daily.

11. Tramadol 50 at bedtime.

12. Protonix 40 daily.

13. Norco 10/325 b.i.d. p.r.n. pain.

14. Memantine 14 daily.

15. Lisinopril 2.5 daily.

16. Ventolin 2 puffs q.6 p.r.n.



DISCHARGE DIAGNOSES:  

1. Altered mental status.

2. Hypertension.

3. Hepatic masses and adrenal masses.

4. Hepatic abscesses.

5. Postmenopausal bleeding.



BRIEF HOSPITAL COURSE:  Ms. Gomez is a very pleasant, unfortunate 

female who presented to [facility] on September 11 via EMS due to passing out in a

bus on her way to the doctor's office, had generalized weakness and abdominal pain.

CT of the abdomen and pelvis showed hepatic masses to the left to the liver lobe and

the patient underwent a CT-guided percutaneous biopsy of the site and no specific

fluid was aspirated.  However, she had 2 positive blood cultures for

methicillin-resistant Staph epidermidis and the patient was seen by ID specialist,

Dr. Espitia, who recommended 6 weeks IV of vancomycin and Zosyn.  The patient

initially was discharged to [facility] for continuation of

IV antibiotics and skilled rehabilitation.  While upon getting there, she did not

like the facility and decided to transfer to [facility] for

physical therapy and IV antibiotics.  The patient initially did very well with

therapy.  She progressed nicely.  She initially had an episode of gout to the right

great toe which resolved status post steroids and the patient was tolerating

physical therapy, but hospitalization was complicated by episodes of postmenopausal

vaginal bleeding and the patient had a transvaginal ultrasound which showed abnormal

thickening in the endouterine, endometrium, and suspicious for endometrial

carcinoma.  Due to these, the patient had an appointment with OB/GYN, Dr. Joey Mcallister, who did an endometrial biopsy.  Endometrial biopsy results a week later

showed no evidence of hyperplasia or malignancy seen.  The patient on October 10

started complaining of severe abdominal pain, nausea, and vomiting.  A repeat CT of

the abdomen was done and this showed overall stable left lobe liver mass, bilateral

adrenal masses, which are larger than prior studies and evidence of post severe

gastric fundal diverticulum or low-attenuated mass of the fundus of the stomach,

pancreas, and left adrenal gland.  The patient continued to be nauseous and she was

given Zofran, which did not help and subsequently she was given Phenergan with Norco

which helped.  By the morning a.m. hours of October 11, the patient became severely

hypotensive, had some altered mental status and some nausea.  She was given a bolus

of normal saline and the decision was made to transfer the patient to [facility] for higher level of care.  The patient's blood pressure prior to transfer

after the bolus of the normal saline improved to 102/56.  The patient was discharged

and sent to [facility] via EMS, her sister, Arturo Rivera was notified of

this. 







Job ID:  [id number]

## 2019-10-11 NOTE — CON
DATE OF CONSULTATION:  



REASON FOR CONSULT:  Adenocarcinoma.



HISTORY OF PRESENT ILLNESS:  Ms. Gomez is a 69-year-old  female, 
who was

transferred from the [facility] to our ER for confusion 
and

hypotension.  She was at the skilled unit after a prolonged hospitalization in 
this

facility, where she was treated for possible liver abscesses.  She had two of 
two

blood cultures positive for Staphylococcus epididymitis.  She has been on IV

antibiotics.  During the prior hospitalization, she had undergone a GI workup.  
CT

scan had shown liver masses that were worrisome for abscesses.  There was a 
biopsy

performed and unfortunately it returned moderately differentiated adenocarcinoma

with extensive necrosis.  It was consistent with pancreaticobiliary tract such 
as

cholangiocarcinoma.  The patient states she has lost about 20 pounds over the 
last

several weeks.  She has a very poor appetite.  Denies any abdominal discomfort.
  No

blood in her stool. 



PAST MEDICAL HISTORY:  

1. Chronic kidney disease.

2. Type 2 diabetes.

3. Dyslipidemia.

4. Hypothyroidism.

5. Upper GI bleed.

6. COPD.

7. Obesity.

8. Diastolic heart failure.

9. Hypertension.



PAST SURGICAL HISTORY:  Bilateral knee replacement.



ALLERGIES:  NO KNOWN DRUG ALLERGIES.



MEDICATIONS:  As noted in [place].



FAMILY HISTORY:  Her mother had liver cancer.  Her father had unknown cancer.



SOCIAL HISTORY:  She is a resident of [place].  No alcohol, tobacco, or 
illicit

drug use. 



REVIEW OF SYSTEMS:  10-point review of systems is negative except for poor 
appetite.



PHYSICAL EXAMINATION:

VITAL SIGNS:  Temperature 98.1, pulse is 72, respiratory rate 18, blood 
pressure is

128/58, she is 98% on 1.5 L nasal cannula. 

GENERAL:  This is an obese female, in no acute distress. 

HEENT:  Normocephalic, atraumatic.  Pupils are equal and reactive to light. 

NECK:  Supple. 

CARDIOVASCULAR:  Regular rate and rhythm. 

LUNGS:  Clear. 

ABDOMEN:  Obese, nontender.  Bowel sounds are positive. 

EXTREMITIES:  No clubbing or cyanosis. 

SKIN:  No rash. 

HEMATOLOGIC:  No petechiae or purpura. 

NEUROLOGIC:  Nonfocal. 

PSYCHIATRIC:  She is alert, oriented, and appropriate.



PERTINENT LABS AND X-RAYS:  WBCs are 14.4, hemoglobin 10.6, hematocrit 33.9,

platelet count 204,000, she has 71% neutrophils, 17% lymphocytes.  Sodium is 131
,

potassium 4.5, chloride 93, CO2 is 26, BUN is 22, creatinine 2.40, calcium is 10
,

bilirubin 0.4, AST is 21, ALT is 11, alkaline phosphatase 211.  Troponin is

negative.  Serum total protein 7.2, albumin 3.3, globulin 3.9, lipase 21.  CEA 
is

27.68.  Urine is negative for bacteria. 



ASSESSMENT:  Moderately differentiated adenocarcinoma of liver lesions.



DISCUSSION:  The patient will need chemotherapy.  Her most recent CT scan 

shows the liver mass measuring 8.9 x 11.8 cm. She may have locally advanced 
disease as

there appears to be no evidence of metastatic disease.  We will follow up in 
the clinic to discuss 

further workup and chemotherapy treatment. I have given her our clinic 
information.  I did speak to Nicole, her sister, and

updated on her status. Patient will follow-up after discharge.



Thank you for the consult. 







Job ID:  [id number]



[email or web address]

## 2019-10-11 NOTE — PDOC.HHP
Hospitalist HPI





- History of Present Illness


Altered mental status and hypotension


History of Present Illness: 





69-year-old female who was recently treated at [facility] last month 

was transferred for altered mental status and hypotension from skilled nursing 

facility. Roughly one month ago patient was admitted to [facility] and 

she was evaluated for sepsis. Patient was seen by infectious disease specialist 

for positive blood cultures and there was concern that the patient might have a 

liver abscess. Patient did undergo liver biopsy and there was no bacteria found 

in liver biopsy. Liver biopsy did confirm etiology of mass consistent with 

pancreatic a biliary tract such as calendula carcinoma. I find the patient in 

the medical unit with telemetry she is sitting upright in bed. Since arrival 

patient has received IV fluids and her blood pressure has normalized. Patient 

is alert and oriented times three and has a fair insight into her clinical 

condition. Patient denies pain at this time. Patient breathing well on room 

air. Patient states that she has not had much of an appetite lately. Patient 

has no other complaints or concerns at this time. Patient does endorse roughly 

20 pounds of weight lost over the past several weeks. Oncology and infectious 

disease consultation requested for further recommendations.





Hospitalist ROS





- Review of Systems


All other systems reviewed; all pertinent +/- noted in HPI/Subj





- Medication


Medications: 


Active Medications











Generic Name Dose Route Start Last Admin





  Trade Name Freq  PRN Reason Stop Dose Admin


 


Ferrous Sulfate  325 mg  10/11/19 17:00  10/11/19 16:43





  Feosol  PO   Not Given





  BID-WM DEDE   





     





     





     





     


 


Sodium Chloride  1,000 mls @ 70 mls/hr  10/11/19 13:30  10/11/19 15:38





  Normal Saline 0.9%  IV   1,000 mls





  .[id number] DEDE   Administration





     





     





     





     


 


Ceftriaxone Sodium 2 gm/  100 mls @ 200 mls/hr  10/11/19 17:00  10/11/19 17:27





  Sodium Chloride  IVPB  10/30/19 23:59  100 mls





  1700 DEDE   Administration





     





     





     





     














Hospitalist History





- Past Medical History


Source: patient


Cardiac: reports: HTN


Pulmonary: reports: high cholesterol, hypertension.  denies: CVA/TIA/stroke, 

congestive heart failure


Heme/Onc: denies: Cancer


Endocrine: reports: Diabetes





- Family History


Family History: reports: hypertension





- Social History


Smoking Status: Never smoker


Alcohol: reports: None


Drugs: reports: none


Living Situation: With Family


Domestic Violence: Negative


Activity level: uses cane/walker





- Exam


General Appearance: NAD, awake alert


Eye: anicteric sclera


ENT: normocephalic atraumatic, moist mucosa


Neck: supple, symmetric, no lymphadenopathy


Heart: RRR, no murmur, no gallops, no rubs


Respiratory: CTAB, no wheezes, no rales, no ronchi, normal chest expansion


Gastrointestinal: soft, non-tender, no palpable masses, no guarding, no rigidity


Extremities: no edema


Skin: no lesions, no rashes


Neurological: cranial nerve grossly intact, normal sensation to touch, no focal 

deficits


Musculoskeletal: generalized weakness


Psychiatric: normal affect, A&O x 3





Hospitalist Results





- Labs


Result Diagrams: 


 10/11/19 12:04





 10/11/19 12:04


Lab results: 


 











WBC  14.4 thou/uL (4.8-10.8)  H  10/11/19  12:04    


 


Hgb  10.6 g/dL (12.0-16.0)  L  10/11/19  12:04    


 


Hct  33.9 % (36.0-47.0)  L  10/11/19  12:04    


 


MCV  85.9 fL (78.0-98.0)   10/11/19  12:04    


 


Plt Count  204 thou/uL (130-400)   10/11/19  12:04    


 


Neutrophils %  70.9 % (42.0-75.0)   10/11/19  12:04    


 


Sodium  131 mmol/L (136-145)  L  10/11/19  12:04    


 


Potassium  4.5 mmol/L (3.5-5.1)   10/11/19  12:04    


 


Chloride  93 mmol/L ()  L  10/11/19  12:04    


 


Carbon Dioxide  26 mmol/L (23-31)   10/11/19  12:04    


 


BUN  22 mg/dL (9.8-20.1)  H  10/11/19  12:04    


 


Creatinine  2.40 mg/dL (0.6-1.1)  H  10/11/19  12:04    


 


Glucose  89 mg/dL ()   10/11/19  12:04    


 


Lactic Acid  1.1 mmol/L (0.5-2.2)   10/11/19  11:15    


 


Calcium  10.0 mg/dL (7.8-10.44)   10/11/19  12:04    


 


Total Bilirubin  0.4 mg/dL (0.2-1.2)   10/11/19  12:04    


 


AST  21 U/L (5-34)   10/11/19  12:04    


 


ALT  11 U/L (8-55)   10/11/19  12:04    


 


Alkaline Phosphatase  211 U/L ()  H  10/11/19  12:04    


 


Troponin I  Less than  0.010 ng/mL (< 0.028)   10/11/19  12:04    


 


Serum Total Protein  7.2 g/dL (6.0-8.3)   10/11/19  12:04    


 


Albumin  3.3 g/dL (3.4-4.8)  L  10/11/19  12:04    


 


Lipase  21 U/L (8-78)   10/11/19  12:04    


 


Urine Ketones  Negative mg/dL (Negative)   10/11/19  11:00    


 


Urine Blood  Trace  (Negative)  A  10/11/19  11:00    


 


Urine Nitrite  Negative  (Negative)   10/11/19  11:00    


 


Ur Leukocyte Esterase  Negative Bruno/uL (Negative)   10/11/19  11:00    


 


Urine RBC  0-3 HPF (0-3)   10/11/19  11:00    


 


Urine WBC  0-3 HPF (0-3)   10/11/19  11:00    


 


Ur Squamous Epith Cells  0-3 HPF (0-3)   10/11/19  11:00    


 


Urine Bacteria  1+ HPF (None Seen)   10/11/19  11:00    














- Radiology Interpretation


  ** CT scan - abdomen


Status: image reviewed by me





Hospitalist H&P A/P





- Problem


(1) Altered mental status


Code(s): R41.82 - ALTERED MENTAL STATUS, UNSPECIFIED   Status: Acute   





(2) AARON (acute kidney injury)


Code(s): N17.9 - ACUTE KIDNEY FAILURE, UNSPECIFIED   Status: Acute   





(3) Hypotension


Status: Acute   





(4) Liver mass


Code(s): R16.0 - HEPATOMEGALY, NOT ELSEWHERE CLASSIFIED   Status: Acute   





(5) CKD (chronic kidney disease), stage IV


Code(s): N18.4 - CHRONIC KIDNEY DISEASE, STAGE 4 (SEVERE)   Status: Chronic   





(6) Diabetes mellitus


Code(s): E11.9 - TYPE 2 DIABETES MELLITUS WITHOUT COMPLICATIONS   Status: 

Chronic   





(7) HTN (hypertension)


Code(s): I10 - ESSENTIAL (PRIMARY) HYPERTENSION   Status: Chronic   





(8) Anemia in chronic kidney disease


Code(s): N18.9 - CHRONIC KIDNEY DISEASE, UNSPECIFIED; D63.1 - ANEMIA IN CHRONIC 

KIDNEY DISEASE   Status: Acute   





- Plan


Plan: 





Plan:


Admit to medical unit with telemetry


oncology consultation, recommendations appreciated


infectious disease consultation, recommendations appreciated


patient with liver mass, biopsy concerning for moderately differentiated 

adenocarcinoma 


bilateral adrenal masses that are enlarging since last admission


Antibiotics to be continued of this time, will de-escalate/stop per infectious 

disease specialist


patient with hypertension which has resolved on admission with IV fluids


patient no longer with confusion, alert and oriented times three with fair 

insight to clinical condition


patient with 20 pounds of unintentional weight loss over the past several weeks


short acting insulin for glucose control


blood pressure control


replace electrolytes as needed


IV fluids for acute kidney injury

## 2019-10-12 LAB
ANION GAP SERPL CALC-SCNC: 13 MMOL/L (ref 10–20)
BASOPHILS # BLD AUTO: 0.1 THOU/UL (ref 0–0.2)
BASOPHILS NFR BLD AUTO: 0.7 % (ref 0–1)
BUN SERPL-MCNC: 19 MG/DL (ref 9.8–20.1)
CALCIUM SERPL-MCNC: 9.1 MG/DL (ref 7.8–10.44)
CHLORIDE SERPL-SCNC: 98 MMOL/L (ref 98–107)
CO2 SERPL-SCNC: 26 MMOL/L (ref 23–31)
CREAT CL PREDICTED SERPL C-G-VRATE: 47 ML/MIN (ref 70–130)
EOSINOPHIL # BLD AUTO: 0.7 THOU/UL (ref 0–0.7)
EOSINOPHIL NFR BLD AUTO: 6 % (ref 0–10)
GLUCOSE SERPL-MCNC: 87 MG/DL (ref 80–115)
HGB BLD-MCNC: 9.5 G/DL (ref 12–16)
LYMPHOCYTES # BLD: 2.1 THOU/UL (ref 1.2–3.4)
LYMPHOCYTES NFR BLD AUTO: 16.9 % (ref 21–51)
MCH RBC QN AUTO: 27.5 PG (ref 27–31)
MCV RBC AUTO: 86.5 FL (ref 78–98)
MONOCYTES # BLD AUTO: 0.9 THOU/UL (ref 0.11–0.59)
MONOCYTES NFR BLD AUTO: 7 % (ref 0–10)
NEUTROPHILS # BLD AUTO: 8.4 THOU/UL (ref 1.4–6.5)
NEUTROPHILS NFR BLD AUTO: 69.4 % (ref 42–75)
PLATELET # BLD AUTO: 189 THOU/UL (ref 130–400)
POTASSIUM SERPL-SCNC: 4.5 MMOL/L (ref 3.5–5.1)
RBC # BLD AUTO: 3.46 MILL/UL (ref 4.2–5.4)
SODIUM SERPL-SCNC: 132 MMOL/L (ref 136–145)
VANCOMYCIN SERPL-MCNC: 20.4 UG/ML
WBC # BLD AUTO: 12.1 THOU/UL (ref 4.8–10.8)

## 2019-10-12 RX ADMIN — ONDANSETRON PRN MG: 2 INJECTION INTRAMUSCULAR; INTRAVENOUS at 14:06

## 2019-10-12 RX ADMIN — PANTOPRAZOLE SODIUM SCH MG: 40 GRANULE, DELAYED RELEASE ORAL at 08:10

## 2019-10-12 RX ADMIN — HYDROCODONE BITARTRATE AND ACETAMINOPHEN PRN TAB: 10; 325 TABLET ORAL at 23:06

## 2019-10-12 NOTE — PDOC.HOSPP
- Subjective


Subjective: 





Seen and examined. Patient clinically improved. Answers all questions 

appropriately. Patient with mild upset stomach with certain foods. Otherwise 

tolerating diet without nausea or vomiting. Patient with several questions, 

discussed with her sister over the telephone and all questions answered in 

detail.





- Objective


Vital Signs & Weight: 


 Vital Signs (12 hours)











  Temp Pulse Resp BP Pulse Ox


 


 10/12/19 11:15  98.2 F  83  20  119/63  96


 


 10/12/19 07:55  98.2 F  73  20  131/73  100








 Weight











Weight                         244 lb














I&O: 


 











 10/11/19 10/12/19 10/13/19





 06:59 06:59 06:59


 


Intake Total  1240 


 


Output Total  1300 600


 


Balance  -60 -600











Result Diagrams: 


 10/12/19 03:52





 10/12/19 03:52


Additional Labs: 


 Accuchecks











  10/12/19 10/12/19 10/11/19





  10:20 06:13 20:26


 


POC Glucose  87  83  108














  10/11/19





  17:03


 


POC Glucose  97











Radiology Reviewed by me: Yes (CT abdomen)





Hospitalist ROS





- Review of Systems


All other systems reviewed; all pertinent +/- noted in HPI/Subj





- Medication


Medications: 


Active Medications











Generic Name Dose Route Start Last Admin





  Trade Name Freq  PRN Reason Stop Dose Admin


 


Acetaminophen  650 mg  10/11/19 13:16  10/12/19 14:06





  Tylenol  PO   650 mg





  Q4H PRN   Administration





  Headache/Fever/Mild Pain (1-3)   





     





     





     


 


Amitriptyline HCl  25 mg  10/11/19 21:00  10/11/19 20:48





  Elavil  PO   25 mg





  HS DEDE   Administration





     





     





     





     


 


Atorvastatin Calcium  40 mg  10/11/19 21:00  10/11/19 20:45





  Lipitor  PO   40 mg





  HS DEDE   Administration





     





     





     





     


 


Calcitriol  0.5 mcg  10/12/19 09:00  10/12/19 08:10





  Rocaltrol  PO   0.5 mcg





  DAILY DEDE   Administration





     





     





     





     


 


Donepezil HCl  5 mg  10/11/19 21:00  10/11/19 20:47





  Aricept  PO   5 mg





  HS DEDE   Administration





     





     





     





     


 


Famotidine  20 mg  10/12/19 09:00  10/12/19 08:10





  Pepcid  PO   20 mg





  DAILY DEDE   Administration





     





     





     





     


 


Ferrous Sulfate  325 mg  10/11/19 17:00  10/12/19 08:10





  Feosol  PO   325 mg





  BID-WM DEDE   Administration





     





     





     





     


 


Levothyroxine Sodium  100 mcg  10/12/19 06:00  10/12/19 05:41





  Synthroid  PO   100 mcg





  0600 DEDE   Administration





     





     





     





     


 


Memantine  5 mg  10/11/19 21:00  10/12/19 08:10





  Namenda  PO   5 mg





  BID DEDE   Administration





     





     





     





     


 


Ondansetron HCl  4 mg  10/11/19 18:44  10/12/19 14:06





  Zofran  IVP   4 mg





  Q4H PRN   Administration





  Nausea/Vomiting   





     





     





     


 


Pantoprazole Sodium  20 mg  10/12/19 09:00  10/12/19 08:10





  Protonix  PO   20 mg





  DAILY DEDE   Administration





     





     





     





     


 


Potassium Chloride  20 meq  10/12/19 08:00  10/12/19 08:10





  K-Dur  PO   20 meq





  QAM-WM DEDE   Administration





     





     





     





     














- Exam


General Appearance: NAD, awake alert


Eye: anicteric sclera


ENT: normocephalic atraumatic, moist mucosa


Neck: supple, symmetric, no lymphadenopathy


Heart: RRR, no murmur, no gallops


Respiratory: CTAB, no wheezes, no rales, no ronchi


Gastrointestinal: soft, non-tender, no guarding, no rigidity


Extremities: no edema


Skin: no lesions, no rashes


Neurological: cranial nerve grossly intact, normal sensation to touch


Musculoskeletal: generalized weakness


Psychiatric: normal affect, A&O x 3





Hosp A/P


(1) Altered mental status


Code(s): R41.82 - ALTERED MENTAL STATUS, UNSPECIFIED   Status: Acute   





(2) AARON (acute kidney injury)


Code(s): N17.9 - ACUTE KIDNEY FAILURE, UNSPECIFIED   Status: Acute   





(3) Hypotension


Status: Acute   





(4) Liver mass


Code(s): R16.0 - HEPATOMEGALY, NOT ELSEWHERE CLASSIFIED   Status: Acute   





(5) CKD (chronic kidney disease), stage IV


Code(s): N18.4 - CHRONIC KIDNEY DISEASE, STAGE 4 (SEVERE)   Status: Chronic   





(6) Diabetes mellitus


Code(s): E11.9 - TYPE 2 DIABETES MELLITUS WITHOUT COMPLICATIONS   Status: 

Chronic   





(7) HTN (hypertension)


Code(s): I10 - ESSENTIAL (PRIMARY) HYPERTENSION   Status: Chronic   





(8) Anemia in chronic kidney disease


Code(s): N18.9 - CHRONIC KIDNEY DISEASE, UNSPECIFIED; D63.1 - ANEMIA IN CHRONIC 

KIDNEY DISEASE   Status: Acute   





- Plan





Plan:


medical unit with telemetry


oncology consultation, recommendations appreciated


infectious disease consultation, recommendations appreciated


patient with biopsy concerning for moderately differentiated adenocarcinoma


bilateral adrenal masses that are enlarging since last admission


antibiotics to be continued per infectious disease specialist will discontinue 

when appropriate


patient with hypotension which has resolved since admission with IV fluids


acute kidney injury has resolved with IV fluids and she is now at her baseline 

renal function


no longer with confusion, alert and oriented times three and has fair insight 

and a clinical condition


20 pounds of unintentional weight lost over the past several weeks


short acting insulin for glucose control sliding-scale


blood pressure control


replace electrolytes as needed

## 2019-10-12 NOTE — CON
DATE OF CONSULTATION:  10/12/2019



REASON FOR CONSULTATION:  Liver mass.



HISTORY OF PRESENT ILLNESS:  This is a 69-year-old, who has a history of CKD 
stage 3

to 4, type-2 diabetes, and obesity, nursing home resident, who developed

gastrointestinal bleeding in June with a small erosion found in the second 
portion

of duodenum, which was cauterized and a 4-mm polyp in the sigmoid colon removed 
in

July.  In September, she presented with acute onset of nausea, vomiting, and

anorexia, was given antimicrobial therapy and diagnosed with urinary tract

infection.  Subsequently, she was admitted with a syncopal event while being

transported in the bus to her doctor's office was found hypotensive.  Before 
this

episode, she was having some abdominal pain in the epigastric area and nausea.

Initial white cell count was 26,000, hemoglobin 10.  Abdomen and pelvis CT 
showed a

9 x 12 cm mass, left lobe of liver with normal-appearing bile ducts.  There was 
a

right and left adrenal mass measuring 1.6 x 2 cm.  2/2 sets of blood cultures 
with

Staph epidermidis.  Those were obtained at 2 separate sites 10 minutes apart.  
The

biopsy was done and no fluid aspirated.  The results showed atypical ductal

proliferation, background of acute inflammation and extensive necrosis.

She was given broad-spectrum coverage while consultative opinion on the biopsy

from [facility] proceeded.  The final  diagnosis has been established as 
adenocarcinoma,

moderately differentiated with extensive necrosis.  The combination of 
morphology

and immunohistochemistry was suggestive of a tumor arising in the 
pancreatobiliary

tract including the possibility of cholangiocarcinoma or an upper 
gastrointestinal

tract tumor.  A more specific test for cholangiocarcinoma was negative in this 
case.

 The patient was admitted at this time with altered mental status and 
hypotension,

decreased oral intake, and was given IV fluids, and blood pressure normalized.

Currently, she is awake, complaining of pain in the right shoulder blade.  No

headaches, visual symptoms, sore throat, odynophagia, or dysphagia.  No 
vomiting.

No abdominal pain.  No cough or sputum production or genitourinary symptoms.  No

diarrhea or bleeding. 



PAST MEDICAL HISTORY:  

1. CKD stage 3 to 4.

2. Type-2 diabetes.

3. Dyslipidemia.

4. Hypothyroidism.

5. Upper GI bleed.

6. Duodenal ulcer.

7. Colonoscopy with colon polyp, which was benign.

8. COPD.

9. Obesity.

10. Diastolic heart failure.

11. Hypertension.

12. Liver mass.



SOCIAL HISTORY:  [place] resident.  Never smoker.  No alcoholic beverage use.



ALLERGIES:  TAPE.



CURRENT MEDICATIONS:  

1. Tylenol.

2. Maalox.

3. DuoNeb.

4. Elavil.

5. Lipitor.

6. Rocephin.

7. Vancomycin.



PHYSICAL EXAMINATION:

VITAL SIGNS:  Normal.  T-max 99.1.  Other vital signs revealed blood pressure

119/63, pulse 83, respirations 20, O2 saturation 96%. 

SKIN:  The patient has a PICC line in left upper extremity.  No 
lymphadenopathy. 

HEENT:  Ocular movements are conjugate.  Pupils are equal.  Conjunctivae normal.

Oral cavity is normal.  Numerous teeth in place with some decay. 

NECK:  Supple.  No jugular vein distention. 

LUNGS:  Symmetric with clear breath sounds.  There is tenderness in the right

scapular area. 

ABDOMEN:  Not tender to palpation or percussion.  Bowel sounds are present.  No

masses.  No organomegaly or bladder distention. 

EXTREMITIES:  No joint inflammatory activity.  Moves extremities equally.  No 
edema.

 Pulses 1+ in dorsalis pedis.  Plantar responses are flexor.  No clonus. 

NEUROLOGIC:  Awake, oriented, follows commands.



LABORATORY DATA:  White cell count 14,000 down to 12,000, hemoglobin 9.5, 
platelets

189 with 69% neutrophils.  Sodium 132, creatinine is 2.04, which is fairly 
stable

compared with prior levels.  Liver profile showed normal transaminases and

bilirubin.  Alkaline phosphatase 211 with albumin 3.3.  CEA was 27.68.  CA-19-9 
was

highly elevated at 6490. 



ASSESSMENT AND PLAN:  

1. Type-2 diabetes.

2. Chronic kidney disease stage 3 to 4.

3. Diastolic congestive heart failure.

4. Liver mass with a final pathologic diagnosis of adenocarcinoma, probably of

pancreatic origin in view of the antigen detected in the serum analysis. 

We will discontinue antimicrobial therapy.  The patient will need chemotherapy

instead for this final diagnosis that was obtained from [facility] recently. 







Job ID:  [id number]



MTDD

## 2019-10-13 RX ADMIN — HYDROCODONE BITARTRATE AND ACETAMINOPHEN PRN TAB: 10; 325 TABLET ORAL at 12:24

## 2019-10-13 RX ADMIN — PANTOPRAZOLE SODIUM SCH MG: 40 GRANULE, DELAYED RELEASE ORAL at 10:24

## 2019-10-13 RX ADMIN — ONDANSETRON PRN MG: 2 INJECTION INTRAMUSCULAR; INTRAVENOUS at 12:31

## 2019-10-13 NOTE — PDOC.HOSPP
- Subjective


Encounter Date: 10/13/19


Encounter Time: 18:25


Subjective: 





f/u for hypotension, confusion and weight loss with recent liver mass bx 

showing adenocarcinoma after being tx for potential liver abscess with IV abx. 

Feels better today and denies any abd pain. Appetite down.





- Objective


Vital Signs & Weight: 


 Vital Signs (12 hours)











  Temp Pulse Resp BP Pulse Ox


 


 10/13/19 16:27  98.3 F  90  16  105/72  92 L


 


 10/13/19 11:46  98.2 F  90  14  123/69  93 L


 


 10/13/19 08:07  98.6 F  89  18  126/69  94 L








 Weight











Weight                         244 lb














I&O: 


 











 10/12/19 10/13/19 10/14/19





 06:59 06:59 06:59


 


Intake Total 1240 1150 


 


Output Total 1300 1300 


 


Balance -60 -150 











Result Diagrams: 


 10/12/19 03:52





 10/12/19 03:52


Additional Labs: 


 Accuchecks











  10/13/19 10/13/19 10/13/19





  17:37 10:42 06:57


 


POC Glucose  86  73  68 L














  10/12/19





  21:59


 


POC Glucose  87








Microbiology





09/11/19 10:50   Urine Straight Catheter   Urine Culture - Final


                              NO GROWTH AT 48 HOURS


09/11/19 09:48   Venous blood - Right Arm   Blood Culture - Final


                              Staphylococcus epidermidis


09/11/19 09:40   Venous blood - Left Hand   Blood Culture - Final


                              Staphylococcus epidermidis#2


10/11/19 12:04   Venous blood - Right Hand   Blood Culture - Preliminary


                              NO GROWTH AT 48 HOURS


10/11/19 12:04   Venous blood - Left Hand   Blood Culture - Preliminary


                              NO GROWTH AT 48 HOURS


09/11/19 09:48   Venous blood - Right Arm   Blood Culture - Preliminary


                              Coagulase Neg Staphylococcus


                              Coagulase Neg Staphylococcus#2


09/11/19 09:40   Venous blood - Left Hand   Blood Culture - Preliminary


                              Coagulase Neg Staphylococcus


                              Coagulase Neg Staphylococcus#2





 Laboratory Tests











  09/11/19 09/11/19 09/12/19





  09:38 09:38 04:11


 


WBC   26.4 H 


 


Hgb   10.8 L 


 


Neutrophils %   


 


Neutrophils % (Manual)   79 H 


 


Band Neuts % (Manual)   


 


Potassium    4.1


 


Creatinine  2.15 H   2.42 H


 


Lipase   


 


Tumor Marker AFP   


 


Carcinoembryonic Ag   


 


CA 19-9 Antigen   


 


Vancomycin Trough   














  09/12/19 09/13/19 09/14/19





  04:11 13:08 05:21


 


WBC  16.4 H  


 


Hgb  8.9 L  


 


Neutrophils %  83.7 H  


 


Neutrophils % (Manual)    77 H


 


Band Neuts % (Manual)    17 H


 


Potassium   


 


Creatinine   


 


Lipase   


 


Tumor Marker AFP   


 


Carcinoembryonic Ag   


 


CA 19-9 Antigen   


 


Vancomycin Trough   35.4 H* 














  10/11/19 10/11/19 10/11/19





  12:04 13:53 13:53


 


WBC   


 


Hgb   


 


Neutrophils %   


 


Neutrophils % (Manual)   


 


Band Neuts % (Manual)   


 


Potassium   


 


Creatinine   


 


Lipase  21  


 


Tumor Marker AFP   2.4 


 


Carcinoembryonic Ag    27.68 H


 


CA 19-9 Antigen   


 


Vancomycin Trough   














  10/11/19





  13:53


 


WBC 


 


Hgb 


 


Neutrophils % 


 


Neutrophils % (Manual) 


 


Band Neuts % (Manual) 


 


Potassium 


 


Creatinine 


 


Lipase 


 


Tumor Marker AFP 


 


Carcinoembryonic Ag 


 


CA 19-9 Antigen  6490 H


 


Vancomycin Trough 











Radiology Reviewed by me: Yes (CT abd/pel - large L lobe liver mass, ? post 

fundus mass, bilat adren mass)


EKG Reviewed by me: Yes (Tele - SR)





Hospitalist ROS





- Medication


Medications: 


Active Medications











Generic Name Dose Route Start Last Admin





  Trade Name Freq  PRN Reason Stop Dose Admin


 


Acetaminophen  650 mg  10/11/19 13:16  10/12/19 14:06





  Tylenol  PO   650 mg





  Q4H PRN   Administration





  Headache/Fever/Mild Pain (1-3)   





     





     





     


 


Hydrocodone Bitart/Acetaminophen  1 tab  10/11/19 13:11  10/13/19 12:24





  Cameron 10/325  PO   1 tab





  Q8H PRN   Administration





  Moderate to Severe Pain (6-10)   





     





     





     


 


Amitriptyline HCl  25 mg  10/11/19 21:00  10/12/19 23:00





  Elavil  PO   25 mg





  HS DEDE   Administration





     





     





     





     


 


Atorvastatin Calcium  40 mg  10/11/19 21:00  10/12/19 23:00





  Lipitor  PO   40 mg





  HS DEDE   Administration





     





     





     





     


 


Calcitriol  0.5 mcg  10/12/19 09:00  10/13/19 10:23





  Rocaltrol  PO   0.5 mcg





  DAILY DEDE   Administration





     





     





     





     


 


Donepezil HCl  5 mg  10/11/19 21:00  10/12/19 23:00





  Aricept  PO   5 mg





  HS DEDE   Administration





     





     





     





     


 


Famotidine  20 mg  10/12/19 09:00  10/13/19 10:24





  Pepcid  PO   20 mg





  DAILY DEDE   Administration





     





     





     





     


 


Ferrous Sulfate  325 mg  10/11/19 17:00  10/13/19 17:45





  Feosol  PO   325 mg





  BID-WM DEDE   Administration





     





     





     





     


 


Levothyroxine Sodium  100 mcg  10/12/19 06:00  10/13/19 06:55





  Synthroid  PO   100 mcg





  0600 DEDE   Administration





     





     





     





     


 


Memantine  5 mg  10/11/19 21:00  10/13/19 10:22





  Namenda  PO   5 mg





  BID DEDE   Administration





     





     





     





     


 


Ondansetron HCl  4 mg  10/11/19 18:44  10/13/19 12:31





  Zofran  IVP   4 mg





  Q4H PRN   Administration





  Nausea/Vomiting   





     





     





     


 


Pantoprazole Sodium  20 mg  10/12/19 09:00  10/13/19 10:24





  Protonix  PO   20 mg





  DAILY DEDE   Administration





     





     





     





     


 


Potassium Chloride  20 meq  10/12/19 08:00  10/13/19 10:22





  K-Dur  PO   20 meq





  QAM-WM DEDE   Administration





     





     





     





     














- Exam


General Appearance: NAD, awake alert


Eye: PERRL, anicteric sclera


ENT: normocephalic atraumatic, no oropharyngeal lesions


Neck: supple, symmetric, no JVD, no thyromegaly, no lymphadenopathy


Heart: RRR, no gallops, no rubs, normal peripheral pulses


Respiratory: CTAB, no wheezes, no rales, no ronchi, normal chest expansion


Gastrointestinal: soft, non-distended, normal bowel sounds


Gastrointestinal - other findings: mild TTP in mid-epigastric region


Extremities: no cyanosis, no edema


Skin: normal turgor, no lesions


Neurological: cranial nerve grossly intact, no new deficit


Musculoskeletal: normal tone, generalized weakness


Psychiatric: A&O x 3





Hosp A/P


(1) Adenocarcinoma of liver


Code(s): C22.9 - MALIG NEOPLASM OF LIVER, NOT SPECIFIED AS PRIMARY OR SEC   

Status: Acute   


Plan: 


Will need outpt follow with medical oncology for tx options








(2) Abdominal pain


Code(s): R10.9 - UNSPECIFIED ABDOMINAL PAIN   Status: Acute   


Qualifiers: 


   Abdominal location: epigastric   Qualified Code(s): R10.13 - Epigastric pain

   


Plan: 


Improved, continue supportive mgmt, pain control as clinically indicated








(3) Anorexia


Code(s): R63.0 - ANOREXIA   Status: Acute   


Plan: 


Likely due to #1, continue to monitor and modify dietary intake, consider Megace








(4) CKD (chronic kidney disease), stage IV


Code(s): N18.4 - CHRONIC KIDNEY DISEASE, STAGE 4 (SEVERE)   Status: Chronic   


Plan: 


Avoid nephrotoxic meds and limit contrast








(5) Diabetes mellitus


Code(s): E11.9 - TYPE 2 DIABETES MELLITUS WITHOUT COMPLICATIONS   Status: 

Chronic   


Plan: 


ISS, ADA








- Plan


PT/OT, , out of bed/ambulate, DVT proph w/SCDs





Stable currently


Continue pain control


Medical oncology follow up


D/c all IV abx


Transfer to medical floor

## 2019-10-14 RX ADMIN — ONDANSETRON PRN MG: 2 INJECTION INTRAMUSCULAR; INTRAVENOUS at 08:45

## 2019-10-14 RX ADMIN — PANTOPRAZOLE SODIUM SCH MG: 40 GRANULE, DELAYED RELEASE ORAL at 08:45

## 2019-10-14 RX ADMIN — DOCUSATE SODIUM 50 MG AND SENNOSIDES 8.6 MG PRN TAB: 8.6; 5 TABLET, FILM COATED ORAL at 19:35

## 2019-10-14 NOTE — PDOC.HOSPP
- Subjective


Encounter Date: 10/14/19


Encounter Time: 18:20


Subjective: 





f/u for liver mass due to adenocarcinoma. c/o constipation with last BM ?. + 

Nausea but no emesis and decreased appetite. 





- Objective


Vital Signs & Weight: 


 Vital Signs (12 hours)











  Temp Pulse Resp BP Pulse Ox


 


 10/14/19 13:02  98.8 F  91  16  129/82  94 L


 


 10/14/19 08:45      92 L


 


 10/14/19 07:08  99.3 F  99  18  105/72  92 L








 Weight











Weight                         244 lb














I&O: 


 











 10/13/19 10/14/19 10/15/19





 06:59 06:59 06:59


 


Intake Total 1150  600


 


Output Total 1300  


 


Balance -150  600











Result Diagrams: 


 10/12/19 03:52





 10/12/19 03:52


Additional Labs: 


 Accuchecks











  10/14/19 10/14/19 10/14/19





  15:28 13:02 04:28


 


POC Glucose  101  72  79














  10/13/19





  20:30


 


POC Glucose  99








Microbiology





09/11/19 10:50   Urine Straight Catheter   Urine Culture - Final


                              NO GROWTH AT 48 HOURS


09/11/19 09:48   Venous blood - Right Arm   Blood Culture - Final


                              Staphylococcus epidermidis


09/11/19 09:40   Venous blood - Left Hand   Blood Culture - Final


                              Staphylococcus epidermidis#2


09/11/19 09:48   Venous blood - Right Arm   Blood Culture - Preliminary


                              Coagulase Neg Staphylococcus


                              Coagulase Neg Staphylococcus#2


09/11/19 09:40   Venous blood - Left Hand   Blood Culture - Preliminary


                              Coagulase Neg Staphylococcus


                              Coagulase Neg Staphylococcus#2





 Laboratory Tests











  09/11/19 09/11/19 09/12/19





  09:38 09:38 04:11


 


WBC   26.4 H 


 


Hgb   10.8 L 


 


Neutrophils %   


 


Neutrophils % (Manual)   79 H 


 


Band Neuts % (Manual)   


 


Potassium    4.1


 


Creatinine  2.15 H   2.42 H


 


Vancomycin Trough   














  09/12/19 09/13/19 09/14/19





  04:11 13:08 05:21


 


WBC  16.4 H  


 


Hgb  8.9 L  


 


Neutrophils %  83.7 H  


 


Neutrophils % (Manual)    77 H


 


Band Neuts % (Manual)    17 H


 


Potassium   


 


Creatinine   


 


Vancomycin Trough   35.4 H* 














Hospitalist ROS





- Medication


Medications: 


Active Medications











Generic Name Dose Route Start Last Admin





  Trade Name Freq  PRN Reason Stop Dose Admin


 


Acetaminophen  650 mg  10/11/19 13:16  10/12/19 14:06





  Tylenol  PO   650 mg





  Q4H PRN   Administration





  Headache/Fever/Mild Pain (1-3)   





     





     





     


 


Hydrocodone Bitart/Acetaminophen  1 tab  10/11/19 13:11  10/13/19 12:24





  [place] 10/325  PO   1 tab





  Q8H PRN   Administration





  Moderate to Severe Pain (6-10)   





     





     





     


 


Amitriptyline HCl  25 mg  10/11/19 21:00  10/13/19 20:41





  Elavil  PO   25 mg





  HS DEDE   Administration





     





     





     





     


 


Atorvastatin Calcium  40 mg  10/11/19 21:00  10/13/19 20:41





  Lipitor  PO   40 mg





  HS DEDE   Administration





     





     





     





     


 


Calcitriol  0.5 mcg  10/12/19 09:00  10/14/19 08:45





  Rocaltrol  PO   0.5 mcg





  DAILY DEDE   Administration





     





     





     





     


 


Docusate Sodium  100 mg  10/11/19 13:20  10/14/19 11:42





  Colace  PO   100 mg





  BIDPRN PRN   Administration





  Constipation   





     





     





     


 


Donepezil HCl  5 mg  10/11/19 21:00  10/13/19 20:41





  Aricept  PO   5 mg





  HS DEDE   Administration





     





     





     





     


 


Famotidine  20 mg  10/12/19 09:00  10/14/19 08:45





  Pepcid  PO   20 mg





  DAILY DEDE   Administration





     





     





     





     


 


Ferrous Sulfate  325 mg  10/11/19 17:00  10/14/19 16:51





  Feosol  PO   325 mg





  BID-WM DEDE   Administration





     





     





     





     


 


Levothyroxine Sodium  100 mcg  10/12/19 06:00  10/14/19 05:47





  Synthroid  PO   100 mcg





  0600 DEDE   Administration





     





     





     





     


 


Memantine  5 mg  10/11/19 21:00  10/14/19 08:45





  Namenda  PO   5 mg





  BID DEDE   Administration





     





     





     





     


 


Ondansetron HCl  4 mg  10/11/19 18:44  10/14/19 08:45





  Zofran  IVP   4 mg





  Q4H PRN   Administration





  Nausea/Vomiting   





     





     





     


 


Pantoprazole Sodium  20 mg  10/12/19 09:00  10/14/19 08:45





  Protonix  PO   20 mg





  DAILY DEDE   Administration





     





     





     





     


 


Potassium Chloride  20 meq  10/12/19 08:00  10/14/19 08:45





  K-Dur  PO   20 meq





  QAM-WM DEDE   Administration





     





     





     





     














- Exam


General Appearance: NAD, awake alert


Eye: PERRL, anicteric sclera


ENT: normocephalic atraumatic, no oropharyngeal lesions


Neck: supple, symmetric, no JVD, no thyromegaly


Heart: RRR, no murmur, no gallops, no rubs, normal peripheral pulses


Respiratory: CTAB, no wheezes, no rales, no ronchi, normal chest expansion


Gastrointestinal: soft, non-tender, non-distended, normal bowel sounds


Extremities: no cyanosis, no clubbing, no edema


Skin: normal turgor, no lesions


Neurological: cranial nerve grossly intact, no new deficit


Musculoskeletal: normal tone, generalized weakness


Psychiatric: A&O x 3





Hosp A/P


(1) Adenocarcinoma of liver


Code(s): C22.9 - MALIG NEOPLASM OF LIVER, NOT SPECIFIED AS PRIMARY OR SEC   

Status: Acute   


Plan: 


Plan for outpt oncology follow up to discuss tx options, may consider 

Palliative options








(2) Abdominal pain


Code(s): R10.9 - UNSPECIFIED ABDOMINAL PAIN   Status: Acute   


Qualifiers: 


   Abdominal location: epigastric   Qualified Code(s): R10.13 - Epigastric pain

   


Plan: 


Likely due to #1 and constipation, start Senokot-S BID, consider Mag Citrate if 

no response.








(3) Anorexia


Code(s): R63.0 - ANOREXIA   Status: Acute   


Plan: 


Due to #1, consider Megace








(4) CKD (chronic kidney disease), stage IV


Code(s): N18.4 - CHRONIC KIDNEY DISEASE, STAGE 4 (SEVERE)   Status: Chronic   


Plan: 


Avoid nephrotoxic meds and limit contrast exposure








(5) Diabetes mellitus


Code(s): E11.9 - TYPE 2 DIABETES MELLITUS WITHOUT COMPLICATIONS   Status: 

Chronic   





- Plan


PT/OT, , out of bed/ambulate, DVT proph w/SCDs





Stable currently


Continue pain control


Senokot-S BID


Mag Citrate x 1 bottle


Medical oncology follow up at outpt


D/c all IV abx


AM lab: BMP


Consider Palliative care consult

## 2019-10-14 NOTE — PDOC.MOPN
Interval History: 





c/o nausea. no questions regarding diagnosis.





- Vital Signs


Vital Signs: 


 Vital Signs (12 hours)











  Temp Pulse Resp BP BP Pulse Ox


 


 10/14/19 13:02  98.8 F  91  16  129/82   94 L


 


 10/14/19 08:45       92 L


 


 10/14/19 07:08  99.3 F  99  18  105/72   92 L


 


 10/14/19 04:00  98.7 F  89  18   107/69  92 L








 Weight











Weight                         244 lb

















- Physical Exam


General: Alert, Oriented x3, No acute distress


HEENT: Atraumatic, PERRLA, EOMI, Mucous membr. moist/pink


Lungs: Clear to auscultation, Normal air movement


Cardiovascular: Regular rate, Normal S1, Normal S2, No murmurs, Gallops, Rubs


Abdomen: Normal bowel sounds, Soft, No tenderness, No hepatospenomegaly, No 

masses


Extremities: No clubbing, No cyanosis, No edema, Normal pulses, No tenderness/

swelling


Skin: No rashes, No breakdown, No significant lesion


Neurological: Normal gait, Normal speech, Strength at 5/5 X4 ext, Normal tone, 

Sensation intact, Cranial nerves 3-12 NL, Reflexes 2+


Psych/Mental Status: Mental status NL, Mood NL





- Labs


Result Diagrams: 


 10/12/19 03:52





 10/12/19 03:52


Lab results: 


 Laboratory Results - last 24 hr





10/14/19 13:02: POC Glucose 72


10/14/19 04:28: POC Glucose 79


10/13/19 20:30: POC Glucose 99


10/13/19 17:37: POC Glucose 86








Status: lab reviewed by me





A/P





- Problem


(1) Adenocarcinoma of liver


Current Visit: Yes   Code(s): C22.9 - MALIG NEOPLASM OF LIVER, NOT SPECIFIED AS 

PRIMARY OR SEC   Status: Acute   





- Plan


Plan: 





1. Patient has elevated CEA and CA 19-9 consistent with biliary adenocarcinoma


2. She will follow-up on 10/31 with Dr. Alarcon to discuss treatment options.

## 2019-10-15 LAB
ANION GAP SERPL CALC-SCNC: 15 MMOL/L (ref 10–20)
BUN SERPL-MCNC: 14 MG/DL (ref 9.8–20.1)
CALCIUM SERPL-MCNC: 9.7 MG/DL (ref 7.8–10.44)
CHLORIDE SERPL-SCNC: 96 MMOL/L (ref 98–107)
CO2 SERPL-SCNC: 24 MMOL/L (ref 23–31)
CREAT CL PREDICTED SERPL C-G-VRATE: 62 ML/MIN (ref 70–130)
GLUCOSE SERPL-MCNC: 78 MG/DL (ref 80–115)
POTASSIUM SERPL-SCNC: 4.1 MMOL/L (ref 3.5–5.1)
SODIUM SERPL-SCNC: 131 MMOL/L (ref 136–145)

## 2019-10-15 RX ADMIN — PANTOPRAZOLE SODIUM SCH MG: 40 GRANULE, DELAYED RELEASE ORAL at 09:04

## 2019-10-15 RX ADMIN — ALUMINUM HYDROXIDE, MAGNESIUM HYDROXIDE, AND SIMETHICONE PRN ML: 200; 200; 20 SUSPENSION ORAL at 12:13

## 2019-10-15 RX ADMIN — DOCUSATE SODIUM 50 MG AND SENNOSIDES 8.6 MG PRN TAB: 8.6; 5 TABLET, FILM COATED ORAL at 16:37

## 2019-10-15 NOTE — PDOC.HOSPP
- Subjective


Encounter Date: 10/15/19


Encounter Time: 16:50


Subjective: 





f/u for hepatic adenocarcinoma and abd pain. States + BM. Appetite improved and 

less nausea.





- Objective


Vital Signs & Weight: 


 Vital Signs (12 hours)











  Temp Pulse Resp BP Pulse Ox


 


 10/15/19 09:05      96


 


 10/15/19 08:00  98.1 F  89  20  110/80  96








 Weight











Weight                         244 lb














I&O: 


 











 10/14/19 10/15/19 10/16/19





 06:59 06:59 06:59


 


Intake Total  1000 


 


Output Total  400 


 


Balance  600 











Result Diagrams: 


 10/12/19 03:52





 10/15/19 05:31


Additional Labs: 


 Accuchecks











  10/15/19 10/15/19 10/14/19





  11:54 04:01 19:01


 


POC Glucose  74  86  91








Microbiology





09/11/19 10:50   Urine Straight Catheter   Urine Culture - Final


                              NO GROWTH AT 48 HOURS


09/11/19 09:48   Venous blood - Right Arm   Blood Culture - Final


                              Staphylococcus epidermidis


09/11/19 09:40   Venous blood - Left Hand   Blood Culture - Final


                              Staphylococcus epidermidis#2


09/11/19 09:48   Venous blood - Right Arm   Blood Culture - Preliminary


                              Coagulase Neg Staphylococcus


                              Coagulase Neg Staphylococcus#2


09/11/19 09:40   Venous blood - Left Hand   Blood Culture - Preliminary


                              Coagulase Neg Staphylococcus


                              Coagulase Neg Staphylococcus#2





 Laboratory Tests











  09/11/19 09/11/19 09/12/19





  09:38 09:38 04:11


 


WBC   26.4 H 


 


Hgb   10.8 L 


 


Neutrophils %   


 


Neutrophils % (Manual)   79 H 


 


Band Neuts % (Manual)   


 


Potassium    4.1


 


Creatinine  2.15 H   2.42 H


 


Vancomycin Trough   














  09/12/19 09/13/19 09/14/19





  04:11 13:08 05:21


 


WBC  16.4 H  


 


Hgb  8.9 L  


 


Neutrophils %  83.7 H  


 


Neutrophils % (Manual)    77 H


 


Band Neuts % (Manual)    17 H


 


Potassium   


 


Creatinine   


 


Vancomycin Trough   35.4 H* 














Hospitalist ROS





- Medication


Medications: 


Active Medications











Generic Name Dose Route Start Last Admin





  Trade Name Freq  PRN Reason Stop Dose Admin


 


Acetaminophen  650 mg  10/11/19 13:16  10/14/19 19:36





  Tylenol  PO   650 mg





  Q4H PRN   Administration





  Headache/Fever/Mild Pain (1-3)   





     





     





     


 


Hydrocodone Bitart/Acetaminophen  1 tab  10/11/19 13:11  10/13/19 12:24





  [place] 10/325  PO   1 tab





  Q8H PRN   Administration





  Moderate to Severe Pain (6-10)   





     





     





     


 


Al Hydroxide/Mg Hydroxide  30 ml  10/11/19 13:11  10/15/19 12:13





  Maalox Plus  PO   30 ml





  Q6H PRN   Administration





  Constipation   





     





     





     


 


Amitriptyline HCl  25 mg  10/11/19 21:00  10/14/19 19:39





  Elavil  PO   25 mg





  HS DEDE   Administration





     





     





     





     


 


Atorvastatin Calcium  40 mg  10/11/19 21:00  10/14/19 19:39





  Lipitor  PO   40 mg





  HS DEDE   Administration





     





     





     





     


 


Calcitriol  0.5 mcg  10/12/19 09:00  10/15/19 09:03





  Rocaltrol  PO   0.5 mcg





  DAILY DEDE   Administration





     





     





     





     


 


Diphenhydramine HCl  25 mg  10/11/19 13:20  10/14/19 19:39





  Benadryl  PO   25 mg





  Q6H PRN   Administration





  Itching & Insomnia   





     





     





     


 


Docusate Sodium  100 mg  10/11/19 13:20  10/15/19 05:26





  Colace  PO   100 mg





  BIDPRN PRN   Administration





  Constipation   





     





     





     


 


Donepezil HCl  5 mg  10/11/19 21:00  10/14/19 19:39





  Aricept  PO   5 mg





  HS DEDE   Administration





     





     





     





     


 


Famotidine  20 mg  10/12/19 09:00  10/15/19 09:03





  Pepcid  PO   20 mg





  DAILY DEDE   Administration





     





     





     





     


 


Ferrous Sulfate  325 mg  10/11/19 17:00  10/15/19 16:37





  Feosol  PO   325 mg





  BID-WM DEDE   Administration





     





     





     





     


 


Levothyroxine Sodium  100 mcg  10/12/19 06:00  10/15/19 05:26





  Synthroid  PO   100 mcg





  0600 DEDE   Administration





     





     





     





     


 


Memantine  5 mg  10/11/19 21:00  10/15/19 09:04





  Namenda  PO   5 mg





  BID EDDE   Administration





     





     





     





     


 


Ondansetron HCl  4 mg  10/11/19 18:44  10/14/19 08:45





  Zofran  IVP   4 mg





  Q4H PRN   Administration





  Nausea/Vomiting   





     





     





     


 


Pantoprazole Sodium  20 mg  10/12/19 09:00  10/15/19 09:04





  Protonix  PO   20 mg





  DAILY DEDE   Administration





     





     





     





     


 


Potassium Chloride  20 meq  10/12/19 08:00  10/15/19 09:03





  K-Dur  PO   20 meq





  QAM-WM DEDE   Administration





     





     





     





     


 


Scopolamine  1.5 mg  10/14/19 19:00  10/14/19 19:33





  Transderm Scop  TD   1.5 mg





  Q3D DEDE   Administration





     





     





     





     


 


Senna/Docusate Sodium  2 tab  10/11/19 13:11  10/15/19 16:37





  Senokot S  PO   2 tab





  BID PRN   Administration





  Constipation   





     





     





     














- Exam


General Appearance: NAD, awake alert


Eye: PERRL, anicteric sclera


ENT: normocephalic atraumatic, no oropharyngeal lesions


Neck: supple, symmetric, no JVD, no thyromegaly, no lymphadenopathy


Heart: RRR, no murmur, no gallops, no rubs, normal peripheral pulses


Respiratory: CTAB, no wheezes, no rales, no ronchi


Gastrointestinal: soft, non-tender, non-distended, normal bowel sounds


Gastrointestinal - other findings: obese


Extremities: no cyanosis, no edema


Skin: normal turgor, no lesions


Neurological: cranial nerve grossly intact, no new deficit


Musculoskeletal: normal tone, generalized weakness


Psychiatric: A&O x 3, flat affect





Hosp A/P


(1) Adenocarcinoma of liver


Code(s): C22.9 - MALIG NEOPLASM OF LIVER, NOT SPECIFIED AS PRIMARY OR SEC   

Status: Acute   


Plan: 


Plan for eventual tx if performance status improves








(2) Abdominal pain


Code(s): R10.9 - UNSPECIFIED ABDOMINAL PAIN   Status: Acute   


Qualifiers: 


   Abdominal location: epigastric   Qualified Code(s): R10.13 - Epigastric pain

   


Plan: 


Improved after BM, continue pain control as clinically indicated, bowel regimen








(3) Anorexia


Code(s): R63.0 - ANOREXIA   Status: Acute   





(4) CKD (chronic kidney disease), stage IV


Code(s): N18.4 - CHRONIC KIDNEY DISEASE, STAGE 4 (SEVERE)   Status: Chronic   


Plan: 


Avoid nephrotoxic meds and limit contrast exposure








(5) Diabetes mellitus


Code(s): E11.9 - TYPE 2 DIABETES MELLITUS WITHOUT COMPLICATIONS   Status: 

Chronic   





- Plan


plan discussed w/ family, PT/OT, , out of bed/ambulate, DVT 

proph w/SCDs





Stable currently


Continue pain control


Senokot-S BID


Mag Citrate PRN


Medical oncology follow up at out


D/c all IV abx


AM lab: BMP


Inpatient Rehab consult


Palliative care consult

## 2019-10-16 RX ADMIN — ONDANSETRON PRN MG: 2 INJECTION INTRAMUSCULAR; INTRAVENOUS at 09:02

## 2019-10-16 RX ADMIN — ALUMINUM HYDROXIDE, MAGNESIUM HYDROXIDE, AND SIMETHICONE PRN ML: 200; 200; 20 SUSPENSION ORAL at 20:29

## 2019-10-16 RX ADMIN — PANTOPRAZOLE SODIUM SCH MG: 40 GRANULE, DELAYED RELEASE ORAL at 08:55

## 2019-10-16 NOTE — PQF
DATE:    10-16-19                                      ATTN:  DR. JOSE CHENEY



Please exercise your independent, professional judgment in responding to the 
clarification form. 

Clinical indicators are provided on the bottom of this form for your review



Please check appropriate box(s):

[ x ] Hyponatremia  please specify etiology, if known _due to decreased po 
intake___________________ 

[  ] Insignificant Lab Values

[  ] Other diagnosis ___________

[  ] Unable to determine



In addition, please specify:

Present on Admission (POA):  [ x ] Yes             [  ] No             [  ] 
Unable to determine



CLINICAL INDICATORS - SIGNS / SYMPTOMS / LABS  / RESULTS AND LOCATION IN EMR:



SODIUM:  10-11-19:   131

                10-12-19:   132

                10-15-19:   131



RISK FACTORS / RESULTS AND LOCATION IN EMR:



     ER NOTES 10-11-19:   EVALUATION OF CONFUSION AND HYPOTENSION,  WEAKNESS



     ER DX 10-11-19:   AMS-RESOLVED, HEPATIC ABSCESS, HYPOTENSION-RESOLVED



     H&P:  10-11-19:   AMS, AARON,  HYPOTENSION, CKD 4,  DM 2, HTN



TREATMENTS / RESULTS AND LOCATION IN EMR:



    H&P 10-11-19:   REPLACE ELECTROLYTES AS NEEDED



   SERIES OF LABS 10-11-19 TO 10-15-19



(This form is maintained as a part of the permanent medical record)

 2015 [employer].  All Rights Reserved

ZAN [email or web address]    Office:  [phone]

                                                              

ABELINO

## 2019-10-16 NOTE — PDOC.HOSPP
- Subjective


Encounter Date: 10/16/19


Encounter Time: 16:00


Subjective: 





f/u for hepatic adenocarcinoma and abd pain. Awaiting rehab screening as pt 

with physical deconditioning and poor performance status. 





- Objective


Vital Signs & Weight: 


 Vital Signs (12 hours)











  Temp Pulse Resp BP Pulse Ox


 


 10/16/19 07:57  98.5 F  82  20  113/63  100








 Weight











Weight                         244 lb














I&O: 


 











 10/15/19 10/16/19 10/17/19





 06:59 06:59 06:59


 


Intake Total 1000 830 


 


Output Total 400 500 


 


Balance 600 330 











Result Diagrams: 


 10/12/19 03:52





 10/15/19 05:31


Additional Labs: 


 Accuchecks











  10/16/19 10/16/19 10/15/19





  11:39 04:49 19:14


 


POC Glucose  147 H  100  88














  10/15/19





  16:25


 


POC Glucose  89








Microbiology





09/11/19 10:50   Urine Straight Catheter   Urine Culture - Final


                              NO GROWTH AT 48 HOURS


09/11/19 09:48   Venous blood - Right Arm   Blood Culture - Final


                              Staphylococcus epidermidis


09/11/19 09:40   Venous blood - Left Hand   Blood Culture - Final


                              Staphylococcus epidermidis#2


09/11/19 09:48   Venous blood - Right Arm   Blood Culture - Preliminary


                              Coagulase Neg Staphylococcus


                              Coagulase Neg Staphylococcus#2


09/11/19 09:40   Venous blood - Left Hand   Blood Culture - Preliminary


                              Coagulase Neg Staphylococcus


                              Coagulase Neg Staphylococcus#2





 Laboratory Tests











  09/11/19 09/11/19 09/12/19





  09:38 09:38 04:11


 


WBC   26.4 H 


 


Hgb   10.8 L 


 


Neutrophils %   


 


Neutrophils % (Manual)   79 H 


 


Band Neuts % (Manual)   


 


Potassium    4.1


 


Creatinine  2.15 H   2.42 H


 


Vancomycin Trough   














  09/12/19 09/13/19 09/14/19





  04:11 13:08 05:21


 


WBC  16.4 H  


 


Hgb  8.9 L  


 


Neutrophils %  83.7 H  


 


Neutrophils % (Manual)    77 H


 


Band Neuts % (Manual)    17 H


 


Potassium   


 


Creatinine   


 


Vancomycin Trough   35.4 H* 














Hospitalist ROS





- Medication


Medications: 


Active Medications











Generic Name Dose Route Start Last Admin





  Trade Name Freq  PRN Reason Stop Dose Admin


 


Acetaminophen  650 mg  10/11/19 13:16  10/15/19 20:10





  Tylenol  PO   650 mg





  Q4H PRN   Administration





  Headache/Fever/Mild Pain (1-3)   





     





     





     


 


Hydrocodone Bitart/Acetaminophen  1 tab  10/11/19 13:11  10/13/19 12:24





  [place] 10/325  PO   1 tab





  Q8H PRN   Administration





  Moderate to Severe Pain (6-10)   





     





     





     


 


Al Hydroxide/Mg Hydroxide  30 ml  10/11/19 13:11  10/15/19 12:13





  Maalox Plus  PO   30 ml





  Q6H PRN   Administration





  Constipation   





     





     





     


 


Amitriptyline HCl  25 mg  10/11/19 21:00  10/15/19 20:09





  Elavil  PO   25 mg





  HS DEDE   Administration





     





     





     





     


 


Atorvastatin Calcium  40 mg  10/11/19 21:00  10/15/19 20:09





  Lipitor  PO   40 mg





  HS DEDE   Administration





     





     





     





     


 


Calcitriol  0.5 mcg  10/12/19 09:00  10/16/19 08:52





  Rocaltrol  PO   0.5 mcg





  DAILY DEDE   Administration





     





     





     





     


 


Diphenhydramine HCl  25 mg  10/11/19 13:20  10/15/19 20:10





  Benadryl  PO   25 mg





  Q6H PRN   Administration





  Itching & Insomnia   





     





     





     


 


Docusate Sodium  100 mg  10/11/19 13:20  10/15/19 05:26





  Colace  PO   100 mg





  BIDPRN PRN   Administration





  Constipation   





     





     





     


 


Donepezil HCl  5 mg  10/11/19 21:00  10/15/19 20:09





  Aricept  PO   5 mg





  HS DEDE   Administration





     





     





     





     


 


Famotidine  20 mg  10/12/19 09:00  10/16/19 08:54





  Pepcid  PO   20 mg





  DAILY DEDE   Administration





     





     





     





     


 


Ferrous Sulfate  325 mg  10/11/19 17:00  10/16/19 08:53





  Feosol  PO   325 mg





  BID-WM DEDE   Administration





     





     





     





     


 


Levothyroxine Sodium  100 mcg  10/12/19 06:00  10/16/19 05:11





  Synthroid  PO   100 mcg





  0600 DEDE   Administration





     





     





     





     


 


Memantine  5 mg  10/11/19 21:00  10/16/19 08:55





  Namenda  PO   5 mg





  BID DEDE   Administration





     





     





     





     


 


Ondansetron HCl  4 mg  10/11/19 18:44  10/16/19 09:02





  Zofran  IVP   4 mg





  Q4H PRN   Administration





  Nausea/Vomiting   





     





     





     


 


Pantoprazole Sodium  20 mg  10/12/19 09:00  10/16/19 08:55





  Protonix  PO   20 mg





  DAILY DEDE   Administration





     





     





     





     


 


Potassium Chloride  20 meq  10/12/19 08:00  10/16/19 08:53





  K-Dur  PO   20 meq





  QAM-WM DEDE   Administration





     





     





     





     


 


Scopolamine  1.5 mg  10/14/19 19:00  10/14/19 19:33





  Transderm Scop  TD   1.5 mg





  Q3D DEDE   Administration





     





     





     





     


 


Senna/Docusate Sodium  2 tab  10/11/19 13:11  10/15/19 16:37





  Senokot S  PO   2 tab





  BID PRN   Administration





  Constipation   





     





     





     














- Exam


General Appearance: NAD, awake alert


Eye: PERRL, anicteric sclera


ENT: normocephalic atraumatic, no oropharyngeal lesions


Neck: supple, symmetric, no JVD, no thyromegaly, no lymphadenopathy


Heart: RRR, no murmur, no gallops, no rubs, normal peripheral pulses


Respiratory: CTAB, no wheezes, no rales, no ronchi


Gastrointestinal: soft, non-tender, non-distended, normal bowel sounds, no 

palpable masses


Extremities: no cyanosis, no clubbing, no edema


Skin: normal turgor, no lesions


Neurological: cranial nerve grossly intact, no new deficit


Musculoskeletal: normal tone, generalized weakness


Psychiatric: A&O x 3





Hosp A/P


(1) Adenocarcinoma of liver


Code(s): C22.9 - MALIG NEOPLASM OF LIVER, NOT SPECIFIED AS PRIMARY OR SEC   

Status: Acute   


Plan: 


Plan for outpt chemotherapy when performance status improves








(2) Abdominal pain


Code(s): R10.9 - UNSPECIFIED ABDOMINAL PAIN   Status: Acute   


Qualifiers: 


   Abdominal location: epigastric   Qualified Code(s): R10.13 - Epigastric pain

   


Plan: 


Secondary to #1, improved








(3) Anorexia


Code(s): R63.0 - ANOREXIA   Status: Acute   





(4) CKD (chronic kidney disease), stage IV


Code(s): N18.4 - CHRONIC KIDNEY DISEASE, STAGE 4 (SEVERE)   Status: Chronic   





(5) Diabetes mellitus


Code(s): E11.9 - TYPE 2 DIABETES MELLITUS WITHOUT COMPLICATIONS   Status: 

Chronic   





(6) Hyponatremia


Code(s): E87.1 - HYPO-OSMOLALITY AND HYPONATREMIA   Status: Acute   


Plan: 


Likely due to decreased po intake








- Plan


plan discussed w/ family, PT/OT, , out of bed/ambulate, DVT 

proph w/SCDs





Stable currently


Continue pain control


Senokot-S BID for bowel regimen


Mag Citrate PRN


Medical oncology follow up as outpt


D/c all IV abx


Inpatient Rehab consult pending


Palliative care consult

## 2019-10-17 VITALS — DIASTOLIC BLOOD PRESSURE: 78 MMHG | SYSTOLIC BLOOD PRESSURE: 125 MMHG | TEMPERATURE: 98.2 F

## 2019-10-17 RX ADMIN — PANTOPRAZOLE SODIUM SCH MG: 40 GRANULE, DELAYED RELEASE ORAL at 09:08

## 2019-10-17 NOTE — DIS
DATE OF ADMISSION:  10/11/2019



DATE OF DISCHARGE:  10/17/2019



DISCHARGE DIAGNOSES:  

1. Acute kidney injury on chronic kidney disease stage 4.

2. Metabolic encephalopathy, multifactorial.

3. Hypotension, resolved.

4. Adenocarcinoma of the liver.

5. Diabetes mellitus type 2.

6. Hyponatremia, mild.

7. Deconditioning.



PERTINENT LABORATORY AND X-RAY FINDINGS:  Sodium ranged between 131 to 132.

Creatinine ranged between 1.55 to 2.40.  Estimated GFR ranged between 20 to 33.

Lactic acid level 1.1.  CA 19-9 antigen 6490.  CEA 27.7, AFP 2.4.  CBC showed a

white blood cell count ranging between 12.1 to 14.4, hemoglobin ranged between 9.5

to 10.6.  Blood cultures x2 dated 10/11/2019, showed no growth at 5 days.  CT of the

abdomen and pelvis dated 10/10/2019, showed left lobe liver mass.  Bilateral adrenal

mass is slightly larger than previous study.  Question of low-attenuation mass

between fundus of the stomach, pancreas and left adrenal gland. 



HOSPITAL COURSE:  The patient was initially admitted after presenting with altered

mental status in the context of hepatic adenocarcinoma.  The patient with recent

diagnosis of hepatic adenocarcinoma without initiation of chemotherapy due to recent

diagnosis and poor overall physical performance.  The patient was treated for mild

acute kidney injury, likely due to volume depletion, associated hypotension, and

deconditioning.  The patient received general supportive management including IV

fluids as well as avoidance of nephrotoxic agents.  The patient clinically

stabilized with supportive management during the hospital course.  The patient was

also recently treated for suspected hepatic abscess with methicillin-resistant

Staphylococcus epidermidis and blood cultures.  The patient was deemed essentially

treated after review by the Infectious Disease Service as well as the pathology

showing a liver biopsy consistent with hepatic adenocarcinoma.  The patient's IV

antibiotics were discontinued and the patient was evaluated by the Medical Oncology

Service with recommendations for physical and occupational therapy to increase

performance status prior to initiation of chemotherapy.  The patient has been

approved to transfer to [facility] for ongoing physical and

occupational therapy.  I have examined the patient at the time of discharge and

discussed followup instructions with the patient and her sister.  The patient

verbalized understanding and in agreement, ready for discharge on 10/17/2019. 



DISCHARGE MEDICATIONS:  

1. Ventolin HFA 2 puffs inhaled q.6 hours p.r.n.

2. Protonix 20 mg p.o. daily.

3. Tramadol 50 mg p.o. at bedtime p.r.n.

4. Elavil 25 mg p.o. at bedtime.

5. Lipitor 40 mg p.o. at bedtime.

6. Calcitriol 0.5 mcg p.o. daily.

7. Colace 100 mg p.o. b.i.d. p.r.n.

8. Aricept 5 mg p.o. at bedtime.

9. Neurontin 300 mg p.o. b.i.d.

10. Norco 10/325 mg one tablet p.o. q.8 hours p.r.n. pain.

11. Levothyroxine 100 mcg p.o. daily.

12. Namenda 5 mg p.o. b.i.d.

13. Zofran 4 mg sublingually q.6 hours p.r.n. nausea, vomiting.

14. Scopolamine patch 1.5 mg transdermally q.72 hours.



FOLLOWUP:  The patient may follow up with Dr. Sharan Alarcon with the [facility] on

10/29/2019, at 3:15 pm.  The patient may follow up with Dr. Burns after discharge

from [facility]. 



CONDITION ON DISCHARGE:  Fair.



ACTIVITY:  Ad rebeca.  Rolling walker with standby/contact guard assistance.



DIET:  ADA.



CODE STATUS:  Full.



DISPOSITION:  Discharged to [facility] on 10/17/2019.



TIME SPENT:  Total time preparing and coordinating discharge, 35 minutes.







Job ID:  [id number]

## 2019-10-19 NOTE — EKG
Test Reason : 

Blood Pressure : ***/*** mmHG

Vent. Rate : 067 BPM     Atrial Rate : 067 BPM

   P-R Int : 174 ms          QRS Dur : 162 ms

    QT Int : 436 ms       P-R-T Axes : 069 -67 095 degrees

   QTc Int : 460 ms

 

Normal sinus rhythm

Left axis deviation

Left bundle branch block

Abnormal ECG

 

Confirmed by ALBA ISSA ([id number]),  FISH COLÓN ([id number]) on 10/19/2019 3:44:07 PM

 

Referred By:             Confirmed By:ALBA ISSA

## 2019-10-26 ENCOUNTER — HOSPITAL ENCOUNTER (INPATIENT)
Dept: HOSPITAL 92 - ERS | Age: 69
LOS: 9 days | Discharge: NURSING, REHAB OR LONG-TERM CARE | DRG: 871 | End: 2019-11-04
Attending: INTERNAL MEDICINE | Admitting: INTERNAL MEDICINE
Payer: MEDICARE

## 2019-10-26 VITALS — BODY MASS INDEX: 42 KG/M2

## 2019-10-26 DIAGNOSIS — A41.9: Primary | ICD-10-CM

## 2019-10-26 DIAGNOSIS — C22.7: ICD-10-CM

## 2019-10-26 DIAGNOSIS — J96.01: ICD-10-CM

## 2019-10-26 DIAGNOSIS — G92: ICD-10-CM

## 2019-10-26 DIAGNOSIS — E87.2: ICD-10-CM

## 2019-10-26 DIAGNOSIS — G25.3: ICD-10-CM

## 2019-10-26 DIAGNOSIS — Z66: ICD-10-CM

## 2019-10-26 DIAGNOSIS — I13.0: ICD-10-CM

## 2019-10-26 DIAGNOSIS — E78.5: ICD-10-CM

## 2019-10-26 DIAGNOSIS — K21.9: ICD-10-CM

## 2019-10-26 DIAGNOSIS — E87.5: ICD-10-CM

## 2019-10-26 DIAGNOSIS — D64.9: ICD-10-CM

## 2019-10-26 DIAGNOSIS — Z51.5: ICD-10-CM

## 2019-10-26 DIAGNOSIS — N39.0: ICD-10-CM

## 2019-10-26 DIAGNOSIS — E27.40: ICD-10-CM

## 2019-10-26 DIAGNOSIS — Z85.05: ICD-10-CM

## 2019-10-26 DIAGNOSIS — J44.9: ICD-10-CM

## 2019-10-26 DIAGNOSIS — J69.0: ICD-10-CM

## 2019-10-26 DIAGNOSIS — E03.9: ICD-10-CM

## 2019-10-26 DIAGNOSIS — E11.22: ICD-10-CM

## 2019-10-26 DIAGNOSIS — I50.32: ICD-10-CM

## 2019-10-26 DIAGNOSIS — Z79.899: ICD-10-CM

## 2019-10-26 DIAGNOSIS — R65.20: ICD-10-CM

## 2019-10-26 DIAGNOSIS — E87.1: ICD-10-CM

## 2019-10-26 DIAGNOSIS — F32.9: ICD-10-CM

## 2019-10-26 DIAGNOSIS — N17.9: ICD-10-CM

## 2019-10-26 DIAGNOSIS — N18.3: ICD-10-CM

## 2019-10-26 DIAGNOSIS — F03.90: ICD-10-CM

## 2019-10-26 LAB
ALBUMIN SERPL BCG-MCNC: 3.7 G/DL (ref 3.4–4.8)
ALP SERPL-CCNC: 273 U/L (ref 40–110)
ALT SERPL W P-5'-P-CCNC: 9 U/L (ref 8–55)
ANION GAP SERPL CALC-SCNC: 18 MMOL/L (ref 10–20)
AST SERPL-CCNC: 28 U/L (ref 5–34)
BACTERIA UR QL AUTO: (no result) HPF
BASOPHILS # BLD AUTO: 0 THOU/UL (ref 0–0.2)
BASOPHILS NFR BLD AUTO: 0.2 % (ref 0–1)
BILIRUB SERPL-MCNC: 0.6 MG/DL (ref 0.2–1.2)
BUN SERPL-MCNC: 44 MG/DL (ref 9.8–20.1)
CALCIUM SERPL-MCNC: 10.5 MG/DL (ref 7.8–10.44)
CHLORIDE SERPL-SCNC: 93 MMOL/L (ref 98–107)
CO2 SERPL-SCNC: 27 MMOL/L (ref 23–31)
CREAT CL PREDICTED SERPL C-G-VRATE: 0 ML/MIN (ref 70–130)
EOSINOPHIL # BLD AUTO: 0.5 THOU/UL (ref 0–0.7)
EOSINOPHIL NFR BLD AUTO: 3.3 % (ref 0–10)
GLOBULIN SER CALC-MCNC: 4.1 G/DL (ref 2.4–3.5)
GLUCOSE SERPL-MCNC: 119 MG/DL (ref 80–115)
HGB BLD-MCNC: 11.5 G/DL (ref 12–16)
LYMPHOCYTES # BLD: 3.7 THOU/UL (ref 1.2–3.4)
LYMPHOCYTES NFR BLD AUTO: 22.6 % (ref 21–51)
MAGNESIUM SERPL-MCNC: 1.9 MG/DL (ref 1.6–2.6)
MCH RBC QN AUTO: 26.7 PG (ref 27–31)
MCV RBC AUTO: 86.8 FL (ref 78–98)
MONOCYTES # BLD AUTO: 1.1 THOU/UL (ref 0.11–0.59)
MONOCYTES NFR BLD AUTO: 6.9 % (ref 0–10)
NEUTROPHILS # BLD AUTO: 11 THOU/UL (ref 1.4–6.5)
NEUTROPHILS NFR BLD AUTO: 66.9 % (ref 42–75)
PLATELET # BLD AUTO: 400 THOU/UL (ref 130–400)
POTASSIUM SERPL-SCNC: 5.6 MMOL/L (ref 3.5–5.1)
PROT UR STRIP.AUTO-MCNC: 30 MG/DL
RBC # BLD AUTO: 4.31 MILL/UL (ref 4.2–5.4)
RBC UR QL AUTO: (no result) HPF (ref 0–3)
SODIUM SERPL-SCNC: 132 MMOL/L (ref 136–145)
WBC # BLD AUTO: 16.4 THOU/UL (ref 4.8–10.8)
WBC UR QL AUTO: (no result) HPF (ref 0–3)

## 2019-10-26 PROCEDURE — 81015 MICROSCOPIC EXAM OF URINE: CPT

## 2019-10-26 PROCEDURE — 83605 ASSAY OF LACTIC ACID: CPT

## 2019-10-26 PROCEDURE — 76856 US EXAM PELVIC COMPLETE: CPT

## 2019-10-26 PROCEDURE — 83935 ASSAY OF URINE OSMOLALITY: CPT

## 2019-10-26 PROCEDURE — 85007 BL SMEAR W/DIFF WBC COUNT: CPT

## 2019-10-26 PROCEDURE — 36416 COLLJ CAPILLARY BLOOD SPEC: CPT

## 2019-10-26 PROCEDURE — 83735 ASSAY OF MAGNESIUM: CPT

## 2019-10-26 PROCEDURE — 51701 INSERT BLADDER CATHETER: CPT

## 2019-10-26 PROCEDURE — 82533 TOTAL CORTISOL: CPT

## 2019-10-26 PROCEDURE — 93306 TTE W/DOPPLER COMPLETE: CPT

## 2019-10-26 PROCEDURE — 96375 TX/PRO/DX INJ NEW DRUG ADDON: CPT

## 2019-10-26 PROCEDURE — 87086 URINE CULTURE/COLONY COUNT: CPT

## 2019-10-26 PROCEDURE — 95816 EEG AWAKE AND DROWSY: CPT

## 2019-10-26 PROCEDURE — 70450 CT HEAD/BRAIN W/O DYE: CPT

## 2019-10-26 PROCEDURE — 80202 ASSAY OF VANCOMYCIN: CPT

## 2019-10-26 PROCEDURE — 95819 EEG AWAKE AND ASLEEP: CPT

## 2019-10-26 PROCEDURE — 81003 URINALYSIS AUTO W/O SCOPE: CPT

## 2019-10-26 PROCEDURE — 84484 ASSAY OF TROPONIN QUANT: CPT

## 2019-10-26 PROCEDURE — 87149 DNA/RNA DIRECT PROBE: CPT

## 2019-10-26 PROCEDURE — 94760 N-INVAS EAR/PLS OXIMETRY 1: CPT

## 2019-10-26 PROCEDURE — 96365 THER/PROPH/DIAG IV INF INIT: CPT

## 2019-10-26 PROCEDURE — 93005 ELECTROCARDIOGRAM TRACING: CPT

## 2019-10-26 PROCEDURE — 85027 COMPLETE CBC AUTOMATED: CPT

## 2019-10-26 PROCEDURE — 84443 ASSAY THYROID STIM HORMONE: CPT

## 2019-10-26 PROCEDURE — 80053 COMPREHEN METABOLIC PANEL: CPT

## 2019-10-26 PROCEDURE — 76770 US EXAM ABDO BACK WALL COMP: CPT

## 2019-10-26 PROCEDURE — 80048 BASIC METABOLIC PNL TOTAL CA: CPT

## 2019-10-26 PROCEDURE — 82805 BLOOD GASES W/O2 SATURATION: CPT

## 2019-10-26 PROCEDURE — 36415 COLL VENOUS BLD VENIPUNCTURE: CPT

## 2019-10-26 PROCEDURE — 71045 X-RAY EXAM CHEST 1 VIEW: CPT

## 2019-10-26 PROCEDURE — 83930 ASSAY OF BLOOD OSMOLALITY: CPT

## 2019-10-26 PROCEDURE — 87186 SC STD MICRODIL/AGAR DIL: CPT

## 2019-10-26 PROCEDURE — 83880 ASSAY OF NATRIURETIC PEPTIDE: CPT

## 2019-10-26 PROCEDURE — 96361 HYDRATE IV INFUSION ADD-ON: CPT

## 2019-10-26 PROCEDURE — 87040 BLOOD CULTURE FOR BACTERIA: CPT

## 2019-10-26 PROCEDURE — 85025 COMPLETE CBC W/AUTO DIFF WBC: CPT

## 2019-10-26 PROCEDURE — 87077 CULTURE AEROBIC IDENTIFY: CPT

## 2019-10-26 NOTE — PDOC.HHP
Hospitalist HPI





- History of Present Illness


jerking in arms


History of Present Illness: 





This is 69 year old female with PMH of hepatic adenocarcinoma, recently 

discharged one week ago, CKD, diabetes, CHF, GERD, hypothyroidism and dementia 

who was sent from  nursing home due to jerking in arms and legs. Per nursing 

staff there, she was responsive during those jerking movements but she was a 

little bit altered from  her baseline. She was sent to ED for seizure 

evaluation. Vitals at time of transfer were stable with /78, HR 76, O2 

sat 98% room air, RR 18. Patient is unable to give a history as she is confused

, she does not know where she is. At baseline per nurse, she speaks good 

English and answers questions appropriately and follows commands. 








Patient denies chest pain, shortness of breath, cough, nausea, vomiting, 

constipation or diarrhea however difficult to state whether this is accurate 

due to patient's drowsiness. 











ED Course: 





Temp was 99, WBC of 16, UA showed 7-10 WBC so was given 1L bolus, started on 

maintenance fluids, given 1 mg  of ativan and IV ceftriaxone. She was also 

found to be hyponatremic and hyperkalemic.  





Hospitalist ROS





- Review of Systems


Eyes: denies: vision change


Cardiovascular: denies: chest pain, palpitations


Gastrointestinal: denies: nausea, vomiting, abdominal pain, diarrhea, 

constipation





- Medication


Medications: 


Active Medications











Generic Name Dose Route Start Last Admin





  Trade Name Freq  PRN Reason Stop Dose Admin


 


Sodium Chloride  1,000 mls @ 125 mls/hr  10/26/19 18:07  10/26/19 19:56





  Normal Saline 0.9%  IV  10/27/19 02:30  1,000 mls





  .Q8H DEDE   Administration





     





     





     





     














Hospitalist History





- Past Medical History


Cardiac: reports: CHF, HTN


Pulmonary: reports: COPD


CNS: reports: Dementia


Heme/Onc: reports: Cancer


Renal/: reports: Chronic renal insuff


Endocrine: reports: Diabetes, Hypothyroidism





- Past Surgical History


Other Surgical History: 





unable to obtain. Per chart review total knee replacement 





- Family History


Other Family History: 





Unable to obtain due to patient's mental status








- Social History


Alcohol: reports: None


Drugs: reports: none


Other Social History: 





Unable to obtain. Uses walker for ambulation








- Exam


General - other findings: drowsy, patient attempting to have conversation but 

speech not clear


Eye: PERRL, anicteric sclera


ENT: normocephalic atraumatic, no oropharyngeal lesions, dry oral mucosa


Neck: supple, symmetric, no JVD, no thyromegaly


Heart: RRR, no murmur, no gallops, no rubs, normal peripheral pulses


Respiratory: CTAB, no wheezes, no rales, no ronchi


Gastrointestinal: soft, non-tender, non-distended, no splenomegaly


Extremities: no cyanosis, no clubbing, no edema


Skin: normal turgor, no lesions


Neurological: cranial nerve grossly intact


Neurological - other findings: Unable to test strength due to patient's mental 

status. 





Hospitalist Results





- Labs


Result Diagrams: 


 10/26/19 13:12





 10/26/19 13:12


Lab results: 


 











WBC  16.4 thou/uL (4.8-10.8)  H  10/26/19  13:12    


 


Hgb  11.5 g/dL (12.0-16.0)  L  10/26/19  13:12    


 


Hct  37.5 % (36.0-47.0)   10/26/19  13:12    


 


MCV  86.8 fL (78.0-98.0)   10/26/19  13:12    


 


Plt Count  400 thou/uL (130-400)   10/26/19  13:12    


 


Neutrophils %  66.9 % (42.0-75.0)   10/26/19  13:12    


 


Sodium  132 mmol/L (136-145)  L  10/26/19  13:12    


 


Potassium  5.6 mmol/L (3.5-5.1)  H  10/26/19  13:12    


 


Chloride  93 mmol/L ()  L  10/26/19  13:12    


 


Carbon Dioxide  27 mmol/L (23-31)   10/26/19  13:12    


 


BUN  44 mg/dL (9.8-20.1)  H  10/26/19  13:12    


 


Creatinine  3.11 mg/dL (0.6-1.1)  H  10/26/19  13:12    


 


Glucose  119 mg/dL ()  H  10/26/19  13:12    


 


Lactic Acid  2.0 mmol/L (0.5-2.2)   10/26/19  13:12    


 


Calcium  10.5 mg/dL (7.8-10.44)  H  10/26/19  13:12    


 


Total Bilirubin  0.6 mg/dL (0.2-1.2)   10/26/19  13:12    


 


AST  28 U/L (5-34)   10/26/19  13:12    


 


ALT  9 U/L (8-55)   10/26/19  13:12    


 


Alkaline Phosphatase  273 U/L ()  H  10/26/19  13:12    


 


Troponin I  0.025 ng/mL (< 0.028)   10/26/19  13:12    


 


Serum Total Protein  7.8 g/dL (6.0-8.3)   10/26/19  13:12    


 


Albumin  3.7 g/dL (3.4-4.8)   10/26/19  13:12    


 


Urine Ketones  Negative mg/dL (Negative)   10/26/19  13:48    


 


Urine Blood  Negative  (Negative)   10/26/19  13:48    


 


Urine Nitrite  Negative  (Negative)   10/26/19  13:48    


 


Ur Leukocyte Esterase  Negative Bruno/uL (Negative)   10/26/19  13:48    


 


Urine RBC  0-3 HPF (0-3)   10/26/19  13:48    


 


Urine WBC  7-10 HPF (0-3)  A  10/26/19  13:48    


 


Ur Squamous Epith Cells  21-50 HPF (0-3)  A  10/26/19  13:48    


 


Urine Bacteria  4+ HPF (None Seen)  A  10/26/19  13:48    














Hospitalist H&P A/P





- Plan


Plan: 


CT brain: no acute disease


EKG: LAD, LBBB 





This is 69 year old female with recently diagnosed hepatic adenocarcinoma 

presenting with jerking of arms and legs, encephalopathy, possible sepsis from  

UTI








Possible sepsis from UTI


- had temp of 99, HR > 90, WBC of 16.4, UA mildly positive


- continue IV ceftriaxone, send urine culture, blood culture, check chest  Xray


- contniue IV fluids 125/hour





Acute encephalopathy


Limb jerking 


- possibly from  UTI versus stroke. Rule out metastasis


- check MRI brain.  Administer aspirin 325 mg now


- continue IV fluids


- check EEG to rule out seizure








Hyponatremia


- s/p 1L bolus and maintenance fluids. Repeat BMP








Hyperkalemia


- potassium 5.6.  Repeat BMP after bolus. EKG unremarkable. Will administer 

kayexelate








Hepatic adenocarcinoma


- recently diagnosed. Reportedly was too weak for chemo








Type II diabetes


- fingersticks achs








AARON


- on IV fluids, creatinine 3.11


- trend tomorrow








Hypothryoidism


- continue levothyroxine


- check TSH








Depression


-hold amitryptyline, lexapro








Code status: full code per nursing home

## 2019-10-26 NOTE — CT
CT BRAIN WITHOUT CONTRAST:

 

Date:  10/26/19 

 

HISTORY:  

Altered mental status. 

 

FINDINGS:

 

There are no previous exams for comparison. 

 

There are patchy areas of decreased attenuation in the periventricular white matter consistent with c
hronic small vessel ischemic disease. No evidence of acute infarct, hemorrhage, midline shift, or abn
ormal extra-axial fluid collections are seen. The ventricular size is appropriate and the basilar cis
terns are patent. The bony calvarium is intact. There is mucosal disease in the paranasal sinuses. 

 

IMPRESSION: 

No CT evidence of acute intracranial process.  

 

POS: [facility]

## 2019-10-26 NOTE — RAD
Exam: Chest one view



HISTORY:Sepsis criteria. Encephalopathy.



Comparison: 6/27/2019, 9/23/2016



FINDINGS:

Cardiac silhouette:Cardiomegaly.

Aorta: Atherosclerosis of the aorta

Pulmonary vessels: Normal

Costophrenic angles: Clear



LUNGS: Persistent opacification in the right paratracheal region. Persistent opacification in the lef
t lung base. There is elevation left hemidiaphragm.

Pneumothorax: None



Osseous abnormalities: None



IMPRESSION: 

1. Atherosclerosis

2. Stable opacification in the right paratracheal region; no change since 9/23/2016.

3. Stable elevation the left hemidiaphragm with increased densities in the left lung base. Left lower
 lobe atelectasis/scar is favored. Superimposed pneumonia cannot be excluded.



Reported By: Wendy Street 

Electronically Signed:  10/26/2019 11:56 PM

## 2019-10-27 LAB
ANION GAP SERPL CALC-SCNC: 14 MMOL/L (ref 10–20)
ANION GAP SERPL CALC-SCNC: 15 MMOL/L (ref 10–20)
BASOPHILS # BLD AUTO: 0.1 THOU/UL (ref 0–0.2)
BASOPHILS NFR BLD AUTO: 0.4 % (ref 0–1)
BUN SERPL-MCNC: 43 MG/DL (ref 9.8–20.1)
BUN SERPL-MCNC: 44 MG/DL (ref 9.8–20.1)
CALCIUM SERPL-MCNC: 8.8 MG/DL (ref 7.8–10.44)
CALCIUM SERPL-MCNC: 9.3 MG/DL (ref 7.8–10.44)
CHLORIDE SERPL-SCNC: 102 MMOL/L (ref 98–107)
CHLORIDE SERPL-SCNC: 99 MMOL/L (ref 98–107)
CO2 SERPL-SCNC: 19 MMOL/L (ref 23–31)
CO2 SERPL-SCNC: 24 MMOL/L (ref 23–31)
CREAT CL PREDICTED SERPL C-G-VRATE: 31 ML/MIN (ref 70–130)
CREAT CL PREDICTED SERPL C-G-VRATE: 31 ML/MIN (ref 70–130)
EOSINOPHIL # BLD AUTO: 0.4 THOU/UL (ref 0–0.7)
EOSINOPHIL NFR BLD AUTO: 2.6 % (ref 0–10)
GLUCOSE SERPL-MCNC: 100 MG/DL (ref 80–115)
GLUCOSE SERPL-MCNC: 113 MG/DL (ref 80–115)
HGB BLD-MCNC: 8.9 G/DL (ref 12–16)
LYMPHOCYTES # BLD: 2.7 THOU/UL (ref 1.2–3.4)
LYMPHOCYTES NFR BLD AUTO: 18.1 % (ref 21–51)
MCH RBC QN AUTO: 26.7 PG (ref 27–31)
MCV RBC AUTO: 87.4 FL (ref 78–98)
MONOCYTES # BLD AUTO: 1.6 THOU/UL (ref 0.11–0.59)
MONOCYTES NFR BLD AUTO: 10.4 % (ref 0–10)
NEUTROPHILS # BLD AUTO: 10.2 THOU/UL (ref 1.4–6.5)
NEUTROPHILS NFR BLD AUTO: 68.5 % (ref 42–75)
PLATELET # BLD AUTO: 333 THOU/UL (ref 130–400)
POTASSIUM SERPL-SCNC: 4.9 MMOL/L (ref 3.5–5.1)
POTASSIUM SERPL-SCNC: 5.1 MMOL/L (ref 3.5–5.1)
RBC # BLD AUTO: 3.35 MILL/UL (ref 4.2–5.4)
SODIUM SERPL-SCNC: 131 MMOL/L (ref 136–145)
SODIUM SERPL-SCNC: 132 MMOL/L (ref 136–145)
WBC # BLD AUTO: 14.9 THOU/UL (ref 4.8–10.8)

## 2019-10-27 RX ADMIN — MULTIPLE VITAMINS W/ MINERALS TAB SCH TAB: TAB at 09:40

## 2019-10-27 RX ADMIN — ASPIRIN SCH MG: 81 TABLET ORAL at 09:39

## 2019-10-27 NOTE — PDOC.HOSPP
- Subjective


Encounter Date: 10/27/19


Encounter Time: 12:00


Subjective: 





Patient denies complaints.  She denies headaches, blurred vision, chest pain, 

shortness of breath, abdominal pain, nausea, vomiting, diarrhea. Denies 

numbness.


Patient aware of month October but not aware of the year. 





- Objective


Vital Signs & Weight: 


 Vital Signs (12 hours)











  Temp Pulse Resp BP Pulse Ox


 


 10/27/19 16:00  97.8 F  87  18  101/62  95


 


 10/27/19 11:25  98.8 F  76  15  100/65  97


 


 10/27/19 09:00  98.6 F  81  16  97/68  96








 Weight











Weight                         253 lb














I&O: 


 











 10/26/19 10/27/19 10/28/19





 06:59 06:59 06:59


 


Intake Total  2082 


 


Balance  2082 











Result Diagrams: 


 10/27/19 05:39





 10/27/19 05:39


Additional Labs: 


 Accuchecks











  10/27/19 10/27/19





  10:57 06:14


 


POC Glucose  105  102














Hospitalist ROS





- Review of Systems


Eyes: denies: vision change





- Medication


Medications: 


Active Medications











Generic Name Dose Route Start Last Admin





  Trade Name Freq  PRN Reason Stop Dose Admin


 


Aspirin  81 mg  10/27/19 09:00  10/27/19 09:39





  Ecotrin  PO   81 mg





  DAILY DEDE   Administration





     





     





     





     


 


Calcitriol  0.5 mcg  10/27/19 09:00  10/27/19 09:39





  Rocaltrol  PO   0.5 mcg





  DAILY DEDE   Administration





     





     





     





     


 


Escitalopram Oxalate  5 mg  10/27/19 09:00  10/27/19 09:42





  Lexapro  PO   5 mg





  DAILY DEDE   Administration





     





     





     





     


 


Iron/Minerals/Multivitamins  1 tab  10/27/19 09:00  10/27/19 09:40





  Theragran M  PO   1 tab





  DAILY DEDE   Administration





     





     





     





     


 


Levothyroxine Sodium  100 mcg  10/27/19 06:00  10/27/19 05:54





  Synthroid  PO   100 mcg





  0600 DEDE   Administration





     





     





     





     


 


Lorazepam  0.5 mg  10/27/19 15:21  10/27/19 15:46





  Ativan  PO   0.5 mg





  Q4H PRN   Administration





  Anxiety   





     





     





     


 


Pantoprazole Sodium  40 mg  10/27/19 09:00  10/27/19 09:40





  Protonix  PO   40 mg





  DAILY DEDE   Administration





     





     





     





     














- Exam


General Appearance: NAD, awake alert


General - other findings: responds to questions, follows commands 


Eye - other findings: difficulty following fingers to eye exam. Pupils sluggish 

to light 


ENT: normocephalic atraumatic, no oropharyngeal lesions


Neck: supple, symmetric, no JVD


Heart: RRR, no murmur, no gallops


Respiratory: CTAB, no wheezes, no rales, no ronchi


Gastrointestinal: soft, non-tender, non-distended


Extremities: no cyanosis, no clubbing, no edema


Skin: normal turgor, no lesions, no rashes


Neurological - other findings: patient has RLE weakness. Babinski negative.  5/

5 strength upper extremitie


Musculoskeletal: normal tone


Psychiatric: normal affect, normal behavior, oriented to time


Psychiatric - other findings: Patient more alert compared to yesterday, but 

sometimes dozes off





Hosp A/P





- Plan


Chest X ray: atherosclerosis, stable opacification in the right paratracheal 

region. Stable elevation left hemidiaphragm with increased densities in left 

lung base. Left lower lobe atelectasis/scar favored. Superimposed pneumonia 

could not be excluded. 


CT brain: no acute disease





This is 69 year old female with recently diagnosed hepatic adenocarcinoma who 

was transferred for evaluation of arm jerking








Severe sepsis possibly secondary to gram positive bacteremia vs possible 

pneumonia 


- blood cultures positive 2/2 bottles to gram positive cocci. However 1/2 

coagulase negative, so question of contaminant. Chest X ray shows possible 

pneumonia. Send sputum cultures 


- will repeat blood cultures.  Will add IV vancomycin and add IV zosyn


- 








Acute encephalopathy secondary to sepsis with hypotension vs stroke vs seizure


- CT head showed on acute disease, MRI ordered and pending. Mental status seems 

improved today so likely there is infectious component 


- EEG ordered to rule out seizure


- continue antibiotics





AARON


- creatinine improved to 3.1


- continue IV fluids








Hyponatremia


- sodium 131, mildly decreased. Continue IV fluids








Hyperkalemia


 - resolved 








Hepatic adenocarcinoma


- not candidate for chemo at tihs time








Hypothyroidism


- continue levothyroxine








Depression


- hold amitryptyline and lexapro











Code status: full code

## 2019-10-28 LAB
ALBUMIN SERPL BCG-MCNC: 2.6 G/DL (ref 3.4–4.8)
ALP SERPL-CCNC: 232 U/L (ref 40–110)
ALT SERPL W P-5'-P-CCNC: 8 U/L (ref 8–55)
ANALYZER IN CARDIO: (no result)
ANION GAP SERPL CALC-SCNC: 12 MMOL/L (ref 10–20)
AST SERPL-CCNC: 23 U/L (ref 5–34)
BASE EXCESS STD BLDA CALC-SCNC: -9.1 MEQ/L
BILIRUB SERPL-MCNC: 0.5 MG/DL (ref 0.2–1.2)
BUN SERPL-MCNC: 36 MG/DL (ref 9.8–20.1)
CA-I BLDA-SCNC: 1.11 MMOL/L (ref 1.12–1.3)
CALCIUM SERPL-MCNC: 7.8 MG/DL (ref 7.8–10.44)
CHLORIDE SERPL-SCNC: 107 MMOL/L (ref 98–107)
CO2 SERPL-SCNC: 19 MMOL/L (ref 23–31)
CREAT CL PREDICTED SERPL C-G-VRATE: 38 ML/MIN (ref 70–130)
GLOBULIN SER CALC-MCNC: 3.4 G/DL (ref 2.4–3.5)
GLUCOSE SERPL-MCNC: 98 MG/DL (ref 80–115)
HCO3 BLDA-SCNC: 18.1 MEQ/L (ref 22–28)
HCT VFR BLDA CALC: 28 % (ref 36–47)
HGB BLD-MCNC: 9.3 G/DL (ref 12–16)
HGB BLD-MCNC: 9.8 G/DL (ref 12–16)
HGB BLDA-MCNC: 9.6 G/DL (ref 12–16)
MCH RBC QN AUTO: 27.4 PG (ref 27–31)
MCV RBC AUTO: 86.8 FL (ref 78–98)
MDIFF COMPLETE?: YES
O2 A-A PPRESDIFF RESPIRATORY: 55.52 MM[HG] (ref 0–20)
PCO2 BLDA: 44.9 MMHG (ref 35–45)
PH BLDA: 7.22 [PH] (ref 7.35–7.45)
PLATELET # BLD AUTO: 310 THOU/UL (ref 130–400)
PO2 BLDA: 88 MMHG (ref 80–?)
POTASSIUM BLD-SCNC: 4.45 MMOL/L (ref 3.7–5.3)
POTASSIUM SERPL-SCNC: 4.7 MMOL/L (ref 3.5–5.1)
RBC # BLD AUTO: 3.41 MILL/UL (ref 4.2–5.4)
SODIUM SERPL-SCNC: 133 MMOL/L (ref 136–145)
SPECIMEN DRAWN FROM PATIENT: (no result)
TROPONIN I SERPL DL<=0.01 NG/ML-MCNC: (no result) NG/ML (ref ?–0.03)
VANCOMYCIN SERPL-MCNC: 17 UG/ML
WBC # BLD AUTO: 16.6 THOU/UL (ref 4.8–10.8)

## 2019-10-28 RX ADMIN — SODIUM BICARBONATE SCH MLS: 84 INJECTION, SOLUTION INTRAVENOUS at 16:24

## 2019-10-28 RX ADMIN — PIPERACILLIN SODIUM, TAZOBACTAM SODIUM SCH MLS: 2; .25 INJECTION, POWDER, LYOPHILIZED, FOR SOLUTION INTRAVENOUS at 10:58

## 2019-10-28 RX ADMIN — MULTIPLE VITAMINS W/ MINERALS TAB SCH TAB: TAB at 10:06

## 2019-10-28 RX ADMIN — HYDROCORTISONE SODIUM SUCCINATE SCH MG: 100 INJECTION, POWDER, FOR SOLUTION INTRAMUSCULAR; INTRAVENOUS at 20:41

## 2019-10-28 RX ADMIN — PIPERACILLIN SODIUM, TAZOBACTAM SODIUM SCH MLS: 2; .25 INJECTION, POWDER, LYOPHILIZED, FOR SOLUTION INTRAVENOUS at 02:16

## 2019-10-28 RX ADMIN — PIPERACILLIN SODIUM, TAZOBACTAM SODIUM SCH MLS: 2; .25 INJECTION, POWDER, LYOPHILIZED, FOR SOLUTION INTRAVENOUS at 18:34

## 2019-10-28 RX ADMIN — ASPIRIN SCH MG: 81 TABLET ORAL at 10:58

## 2019-10-28 NOTE — CON
DATE OF CONSULTATION:  



CONSULTING PHYSICIAN:  Regina Chen MD



REASON FOR CONSULTATION:  Hypotension.



HISTORY OF PRESENT ILLNESS:  Ms. Gomez is a 69-year-old female, who was admitted

to the hospital yesterday with a diagnosis of arm and leg jerking.  I am not quite

sure what her baseline is in that regard, but while I am seeing the patient, she has

a steady tremor in her arms. 



I was called by the hospitalist with concerns about persistent hypotension in this

patient.  She has apparently had systolic blood pressures in the 60s, although it is

not documented in the computer record in the chart.  On arrival here, her blood

pressure was 139/72 and her heart rate was 87.  She was mentating properly and was

alert and oriented. 



PAST MEDICAL HISTORY:  

1. Congestive heart failure.

2. Hypertension.

3. COPD.

4. Dementia.

5. Chronic renal insufficiency.

6. Diabetes.

7. Hypothyroidism.

8. Hepatic adenocarcinoma.



PAST SURGICAL HISTORY:  Total knee replacement.



FAMILY MEDICAL HISTORY:  Unremarkable.



SOCIAL HISTORY:  Nonsmoker.  Does not consume alcohol.



MEDICATIONS PRIOR TO ADMISSION:  

1. Tramadol 100 mg 4 times daily as needed.

2. Multivitamin 1 daily.

3. Lexapro 5 mg daily.

4. Aspirin 81 mg daily.

5. Acetaminophen with Codeine 1 tablet every 4 hours as needed.

6. Ventolin HFA metered-dose inhaler as needed.

7. Protonix 40 mg daily.

8. Zofran 4 mg sublingual q.6 hours as needed.

9. Namenda 5 mg b.i.d.

10. Levothyroxine 100 mcg daily.

11. Gabapentin 300 mg b.i.d.

12. Docusate 100 mg daily.

13. Rocaltrol 0.5 mcg daily.

14. Atorvastatin 40 mg daily.

15. Amitriptyline 25 mg nightly.

16. Acetaminophen 650 mg daily. 



I do not see any documentation that she is on antihypertensive therapy.



ALLERGIES:  ADHESIVE TAPE.



REVIEW OF SYSTEMS:  Otherwise, negative.



PHYSICAL EXAMINATION:

VITAL SIGNS:  Temperature 98.9, pulse 97, respirations 18, and blood pressure

137/74.  She is an obese female.  She is 5 feet and 5 inches, weighs 253 pounds with

body mass index of 42.1. 

HEENT:  Remarkable for class III Mallampati airway. 

NECK:  No adenopathy or JVD. 

LUNGS:  Clear to auscultation anteriorly. 

CARDIOVASCULAR:  S1 and S2 regular without audible murmur. 

ABDOMEN:  Soft, nontender to deep palpation. 

EXTREMITIES:  No clubbing, cyanosis, or edema.



LABORATORY DATA:  Sodium 131, potassium 4.9, chloride 102, CO2 of 19, BUN 43,

creatinine 3.1, and glucose 100.  ABG, pH of 7.22, pCO2 of 44, pO2 of 88, and that

was on 28% nasal cannula.  White blood cell count 16.6, hematocrit 29.6, and

platelet count 310.  Lactic acid level 0.7.  Troponin 0.014.  TSH 4.13.  Cortisol

level is pending.  Albumin level is 2.6.  Chest x-ray on admission showed chronic

elevation in left hemidiaphragm.  Cultures demonstrate gram-positive cocci, 2/2

bottles, presumably coag-negative staph.  She is currently on piperacillin and

vancomycin. 



ASSESSMENT:  

1. Transient hypotension - perhaps related to fluid status.  Also could be adrenal

insufficiency.  Also could have developed cirrhosis from her liver tumor. 

2. Non-anion gap metabolic acidosis.



PLAN:  

1. I would add bicarbonate to her IV fluids.

2. Hopefully, we do not have to give vasopressors.

3. Checking cortisol.  She is also being given empiric steroids.

4. Add vasopressors if needed.







Job ID:  [id number]

## 2019-10-28 NOTE — PQF
LARRYASIF REYES                                   DALIA, [facility]

[id number]                                                             [facility] [place]

[id number]                             

                                   

CLINICAL DOCUMENTATION IMPROVEMENT CLARIFICATION FORM:  ICD-10 Updated



PLEASE DO AN ADDENDUM TO THE PROGRESS NOTE WITH ANY DOCUMENTATION UPDATES OR 
ADDITIONS AND CARRY THROUGH TO DC SUMMARY.   THANK YOU.



DATE:            10/28/19, 10/29/19                              ATTN:  DR. DANTE GÓMEZ



Please exercise your independent, professional judgment in responding to the 
clarification form. Clinical indicators are provided on the bottom of this form 
for your review.



Please check appropriate box(s):



                        Acute Encephalopathy:



Etiology: [  ] Hypertensive              [  X ] Metabolic      [  ] Toxic      
[  ] Hepatic with Coma       

                            [  ] Hepatic w/o Coma      [  ] Hypoxic         [ X 
] Septic

  [  ] Drug induced: _______________      

                            [  ] Unspecified ______________ 

                            [  ] in the setting of underlying dementia 

                            [  ] Other (please specify) 



[  ] Other diagnosis ___________

[  ] Unable to determine



In addition, please specify:

Present on Admission (POA):  [ X ] Yes             [  ] No             [  ] 
Unable to determine



For continuity of documentation, please document condition throughout progress 
notes and discharge summary.  Thank You.



CLINICAL INDICATORS - SIGNS / SYMPTOMS / LABS   / RESULTS AND LOCATION IN EMR



10/26 H&P (DALIA)  PT IS UNABLE TO GIVE A HISTORY, AS SHE IS CONFUSED, SHE DOES 
NOT KNOW WHERE SHE IS .  ACUTE ENCEPHALOPATHY, POSSIBLE FROM UTI VS. STROKE, R/
O METASTASIS



10/27 PN (DALIA) PT AWARE OF MONTH, BUT NOT AWARE OF THE YEAR, PT MORE ALERT 
COMPARED TO YESTERDAY.  A/P:  ACUTE ENCEPHALOPATHY 2/2 TO SEPSIS W HYPOTENSION 
VS STROKE VS SEIZURE  - CT HEAD SHOWED NO ACUTE DISTRESS, MRI ORDERED AND 
PENDING, MENTAL STATUS SEEMS IMPROVED TODAY SO LIKELY THERE IS INFECTIOUS 
COMPONENT.



10/28 PN (DALIA)  PT CURRENTLY SEEMS ALTERED AGAIN COMPARED TO YESTERDAY.  A/P:  
ACUTE ENCEPHALOPATHY 2/2 SEPSIS W HYPOTENSION VS. STROKE VS SEIZURE.  -- MENTAL 
STATUS WORSENED TODAY, POSSIBLE HYPOTENSION AGGRAVATING IT, ALTHOUGH MAP 
CURRENTLY 65 AND STILL SEEMS CONFUSED.  



RISK:

DX SEVERE SEPSIS, HYPOTENSION, POSSIBLE PNEUMONIA, GRAM POSITIVE BACTEREMIA ( PN
/DALIA) 10/26



TREATMENT:

NEUROLOGY CONSULT ORDERED 10/28

SERIAL LABS (10/26-PRESENT)

SUPPLEMENTAL OXYGEN  (10/26-PRESENT)



THANK YOU! LANA







(This form is maintained as a part of the permanent medical record)

 2015 [employer]  All Rights Reserved

[email or web address]    [phone]

                                                              

 



MTDD

## 2019-10-28 NOTE — CT
CT HEAD NONCONTRAST:

 

Date:  10/28/19 

 

INDICATION:

Sepsis, AMS. 

 

FINDINGS:

No acute intracranial hemorrhage, mass effect, or midline shift. Redemonstration of multifocal hypoat
tenuation throughout each cerebral hemisphere indicative of microvascular ischemic disease. Partially
 imaged density of the left maxillary sinus is present, likely a retention cyst, although incompletel
y assessed. 

 

IMPRESSION: 

1.  No acute intracranial hemorrhage or mass effect. 

2.  Moderate microvascular ischemic disease involving the cerebral white matter. 

 

 

POS: [facility]

## 2019-10-28 NOTE — ULT
Renal sonogram



HISTORY: Renal insufficiency.



FINDINGS: Right kidney measures up to 9.1 cm. Normal appearance without hydronephrosis. Urinary bladd
er is decompressed by Villanueva catheter.



Left kidney is not visualized. Obscured by bowel gas. On recent CT from 10/10/2019, there was no hydr
onephrosis.











IMPRESSION: Normal appearance of the right kidney. No evidence of urinary tract obstruction.



Nonvisualization left kidney.



Reported By: MERRICK Mckee 

Electronically Signed:  10/28/2019 5:03 PM

## 2019-10-28 NOTE — PDOC.EVN
Event Note





- Event Note


Event Note: 








Was informed patient's blood pressures are in the 60's 'currently. Patient has 

already received 3L bolus of fluid without any response. Patient appears drowsy 

and complains of dizziness. Difficulty following commands. 





Will transfer to CCU for phenylephrine possible pressors. 








Order stat CMP, lactic acid. Lactate from this morning was naormal.

## 2019-10-28 NOTE — PDOC.HOSPP
- Subjective


Encounter Date: 10/28/19


Encounter Time: 21:30


Subjective: 





Yesterday MRI was unable to be done due to patient persistently moving during 

MRI.  This morning patient noted to be hypotensive,  currently receiving 2L 

bolus. Repeat BP 95. 





Patient currently seems altered again compared to yesterday. States she has no 

complaints. Follows some commands but has difficulty answering questions 





Had some jerking of her arms and legs per nursing staff.    Per EEG tech, she 

did not have seizure activity during this jerking. 








- Objective


Vital Signs & Weight: 


 Vital Signs (12 hours)











  Temp Pulse Resp BP BP Pulse Ox


 


 10/28/19 08:23      109/53 L 


 


 10/28/19 07:41  98.6 F  95  18  82/43 L   93 L


 


 10/28/19 05:38      114/53 L 


 


 10/28/19 04:00  98.8 F  85  18  82/57 L   93 L


 


 10/28/19 02:35      95/47 L 


 


 10/28/19 01:30      109/72 


 


 10/28/19 01:21      80/38 L 


 


 10/28/19 00:02  98.3 F  84  16  90/59 L   96








 Weight











Weight                         253 lb














I&O: 


 











 10/27/19 10/28/19 10/29/19





 06:59 06:59 06:59


 


Intake Total 2082 1650


 


Balance 2082 1650











Result Diagrams: 


 10/28/19 05:03





 10/27/19 05:39


Additional Labs: 


 Accuchecks











  10/28/19 10/27/19 10/27/19





  06:07 20:05 17:08


 


POC Glucose  80  95  91














  10/27/19





  10:57


 


POC Glucose  105














Hospitalist ROS





- Review of Systems


Constitutional: denies: fever, chills





- Medication


Medications: 


Active Medications











Generic Name Dose Route Start Last Admin





  Trade Name Freq  PRN Reason Stop Dose Admin


 


Aspirin  81 mg  10/27/19 09:00  10/27/19 09:39





  Ecotrin  PO   81 mg





  DAILY DEDE   Administration





     





     





     





     


 


Atorvastatin Calcium  40 mg  10/27/19 21:00  10/27/19 20:17





  Lipitor  PO   40 mg





  HS DEDE   Administration





     





     





     





     


 


Calcitriol  0.5 mcg  10/27/19 09:00  10/27/19 09:39





  Rocaltrol  PO   0.5 mcg





  DAILY DEDE   Administration





     





     





     





     


 


Escitalopram Oxalate  5 mg  10/27/19 09:00  10/27/19 09:42





  Lexapro  PO   5 mg





  DAILY DEDE   Administration





     





     





     





     


 


Sodium Chloride  1,000 mls @ 75 mls/hr  10/27/19 18:30  10/27/19 19:24





  Normal Saline 0.9%  IV   1,000 mls





  .[id number] DEDE   Administration





     





     





     





     


 


Piperacillin Sod/Tazobactam  100 mls @ 200 mls/hr  10/28/19 02:00  10/28/19 02:

16





  Sod 2.25 gm/ Sodium Chloride  IVPB   100 mls





  0200,1000,1800 DEDE   Administration





     





     





     





     


 


Sodium Chloride  2,000 mls @ 0 mls/hr  10/28/19 08:00  10/28/19 08:22





  Normal Saline 0.9%  IV  10/28/19 10:00  2,000 mls





  .Q0M DEDE   Administration





     





     





     





  As Directed   


 


Iron/Minerals/Multivitamins  1 tab  10/27/19 09:00  10/27/19 09:40





  Theragran M  PO   1 tab





  DAILY DEDE   Administration





     





     





     





     


 


Levothyroxine Sodium  100 mcg  10/27/19 06:00  10/28/19 05:35





  Synthroid  PO   100 mcg





  0600 DEDE   Administration





     





     





     





     


 


Lorazepam  0.5 mg  10/27/19 15:21  10/27/19 15:46





  Ativan  PO   0.5 mg





  Q4H PRN   Administration





  Anxiety   





     





     





     


 


Pantoprazole Sodium  40 mg  10/27/19 09:00  10/27/19 09:40





  Protonix  PO   40 mg





  DAILY DEDE   Administration





     





     





     





     














- Exam


General Appearance: NAD, awake alert


Eye: PERRL, anicteric sclera


ENT: normocephalic atraumatic, no oropharyngeal lesions, dry oral mucosa


Neck: supple, symmetric, no JVD


Heart: RRR, no murmur, no gallops, no rubs


Respiratory: CTAB, no wheezes, no rales, no ronchi


Gastrointestinal: soft, non-tender, non-distended


Extremities: no cyanosis, no clubbing, no edema


Skin: normal turgor, no lesions, no rashes


Neurological: cranial nerve grossly intact, normal sensation to touch


Neurological - other findings: 5/5 strength in lower extremities. Possible 

decreased strength left side


Musculoskeletal: normal tone, normal strength, no muscle wasting


Musculoskeletal - other findings: Poor effort overall so difficult to assess


Psychiatric: normal affect, normal behavior, not oriented





Hosp A/P





- Plan


Chest X ray: atherosclerosis, stable opacification in the right paratracheal 

region. Stable elevation left hemidiaphragm with increased densities in left 

lung base. Left lower lobe atelectasis/scar favored. Superimposed pneumonia 

could not be excluded. 


CT brain: no acute disease





This is 69 year old female with recently diagnosed hepatic adenocarcinoma who 

was transferred for evaluation of arm jerking








Severe sepsis possibly secondary to gram positive bacteremia vs possible 

pneumonia 


- blood cultures positive 2/2 bottles to gram positive cocci. However 1/2 

coagulase negative, so question of contaminant.  Repeat blood cultures negative 

so far


- Chest X ray shows possible pneumonia. Sputum culture pending


- continue IV vancomycin and zosyn for now











Acute encephalopathy secondary to sepsis with hypotension vs stroke vs seizure


- CT head showed on acute disease, MRI ordered and pending. Mental status 

worsened today, possibly hypotension aggravating it, although MAP currently now 

65 and still seems confused


- EEG being done today 


- continue antibiotics


- will check neuro consult 








AARON - likely prerenal 


- creatinine improved to 3.16. 


- continue IV fluids


- check renal ultrasound 








Hyponatremia


- sodium 131, mildly decreased. Continue IV fluids








Hyperkalemia


 - resolved 








Hepatic adenocarcinoma


- per daughter stated that she may be candidate for chemo, was doing better at 

rehab 








Hypothyroidism


- continue levothyroxine








Depression


- hold amitryptyline and lexapro











Code status: full code

## 2019-10-28 NOTE — PQF
HERNANDEZ,LAURA REYES                                      [place], [facility]

[phone]                                                             [facility]

[id number]                             

                                   

CLINICAL DOCUMENTATION IMPROVEMENT CLARIFICATION FORM:  ICD-10 Updated



PLEASE DO AN ADDENDUM TO THE PROGRESS NOTE WITH ANY DOCUMENTATION UPDATES OR 
ADDITIONS AND CARRY THROUGH TO DC SUMMARY.   THANK YOU.



DATE:             10/28/19  , 10/29/19                   ATTN:DR. KRISTA GÓMEZ



Please exercise your independent, professional judgment in responding to the 
clarification form. Clinical indicators are provided on the bottom of this form 
for your review.



Please check appropriate box(s):



[X  ] Acute Respiratory Failure:                     [ X ] with Hypoxia[  ] 
with Hypercapnia

[  ] Acute On Chronic Respiratory Failure:    [  ] with Hypoxia [  ] with 
Hypercapnia

[  ] Acute Respiratory Failure due to: (etiology) ______________________ 

[  ] Chronic Respiratory Failure only            [  ] with Hypoxia       [  ] 
with Hypercapnia

[  ] Hypoxia

[  ] Other diagnosis ___________

[  ] Unable to determine



In addition, please specify:

Present on Admission (POA):  [  ] Yes             [  ] No             [  ] 
Unable to determine



For continuity of documentation, please document condition throughout progress 
notes and discharge summary.  Thank You.





CLINICAL INDICATORS - SIGNS / SYMPTOMS / LABS    / RESULTS AND LOCATION IN MR



10/26 :  ED REPORT O2 SATS  93% RA> 100% 4L/NC// PT LIFTED UP IN BED TO ASSIST 
WITH BREATHING, AUDIBLE WHEEZING HEARD ON EXPIRATION.  PRIOR TO ADMINISTRATION 
OF SUPPLEMENTAL O2 BREATH SOUNDS DIMINISHED, TO THE BILATERAL LOWER LOBES.  88% 
RA,  ED PHYSICIAN DX: SEPSIS



10/26 CXR IMPRESSION: INCREASED DENSITIES THE LEFT LUNG BASE. LEFT LOWER LOBE 
ATELECTASIS/SCAR IS FAVORED,  SUPERIMPOSED PNEUMONIA CANNOT BE EXCLUDED.



RISK:

HX COPD, CHF, POSSIBLE PNEUMONIA, DX SEPSIS ( H&P/DALIA) 10/26



TREATMENT:

SUPPLEMENTAL OXYGEN (10/26-PRESENT)

CXR  10/26







THANK YOU! LANA



(This form is maintained as a part of the permanent medical record)

 2015 [employer].  All Rights Reserved

[email or web address]    [phone]

                                                              

MTDD

## 2019-10-29 LAB
ALBUMIN SERPL BCG-MCNC: 2.5 G/DL (ref 3.4–4.8)
ALP SERPL-CCNC: 222 U/L (ref 40–110)
ALT SERPL W P-5'-P-CCNC: 9 U/L (ref 8–55)
ANION GAP SERPL CALC-SCNC: 13 MMOL/L (ref 10–20)
ANION GAP SERPL CALC-SCNC: 14 MMOL/L (ref 10–20)
AST SERPL-CCNC: 23 U/L (ref 5–34)
BASOPHILS # BLD AUTO: 0 THOU/UL (ref 0–0.2)
BASOPHILS NFR BLD AUTO: 0.1 % (ref 0–1)
BILIRUB SERPL-MCNC: 0.4 MG/DL (ref 0.2–1.2)
BUN SERPL-MCNC: 33 MG/DL (ref 9.8–20.1)
BUN SERPL-MCNC: 36 MG/DL (ref 9.8–20.1)
CALCIUM SERPL-MCNC: 7.8 MG/DL (ref 7.8–10.44)
CALCIUM SERPL-MCNC: 7.8 MG/DL (ref 7.8–10.44)
CHLORIDE SERPL-SCNC: 104 MMOL/L (ref 98–107)
CHLORIDE SERPL-SCNC: 105 MMOL/L (ref 98–107)
CO2 SERPL-SCNC: 19 MMOL/L (ref 23–31)
CO2 SERPL-SCNC: 19 MMOL/L (ref 23–31)
CREAT CL PREDICTED SERPL C-G-VRATE: 41 ML/MIN (ref 70–130)
CREAT CL PREDICTED SERPL C-G-VRATE: 43 ML/MIN (ref 70–130)
EOSINOPHIL # BLD AUTO: 0 THOU/UL (ref 0–0.7)
EOSINOPHIL NFR BLD AUTO: 0.1 % (ref 0–10)
GLOBULIN SER CALC-MCNC: 3.6 G/DL (ref 2.4–3.5)
GLUCOSE SERPL-MCNC: 176 MG/DL (ref 80–115)
GLUCOSE SERPL-MCNC: 198 MG/DL (ref 80–115)
HGB BLD-MCNC: 9.6 G/DL (ref 12–16)
LYMPHOCYTES # BLD: 0.9 THOU/UL (ref 1.2–3.4)
LYMPHOCYTES NFR BLD AUTO: 7.2 % (ref 21–51)
MCH RBC QN AUTO: 26.7 PG (ref 27–31)
MCV RBC AUTO: 86.7 FL (ref 78–98)
MONOCYTES # BLD AUTO: 0 THOU/UL (ref 0.11–0.59)
MONOCYTES NFR BLD AUTO: 0.3 % (ref 0–10)
NEUTROPHILS # BLD AUTO: 11.6 THOU/UL (ref 1.4–6.5)
NEUTROPHILS NFR BLD AUTO: 92.3 % (ref 42–75)
PLATELET # BLD AUTO: 299 THOU/UL (ref 130–400)
POTASSIUM SERPL-SCNC: 4.8 MMOL/L (ref 3.5–5.1)
POTASSIUM SERPL-SCNC: 4.9 MMOL/L (ref 3.5–5.1)
RBC # BLD AUTO: 3.58 MILL/UL (ref 4.2–5.4)
SODIUM SERPL-SCNC: 132 MMOL/L (ref 136–145)
SODIUM SERPL-SCNC: 132 MMOL/L (ref 136–145)
WBC # BLD AUTO: 12.5 THOU/UL (ref 4.8–10.8)

## 2019-10-29 RX ADMIN — INSULIN HUMAN PRN UNIT: 100 INJECTION, SOLUTION PARENTERAL at 16:02

## 2019-10-29 RX ADMIN — PIPERACILLIN SODIUM, TAZOBACTAM SODIUM SCH MLS: 2; .25 INJECTION, POWDER, LYOPHILIZED, FOR SOLUTION INTRAVENOUS at 02:58

## 2019-10-29 RX ADMIN — HYDROCORTISONE SODIUM SUCCINATE SCH MG: 100 INJECTION, POWDER, FOR SOLUTION INTRAMUSCULAR; INTRAVENOUS at 04:22

## 2019-10-29 RX ADMIN — MULTIPLE VITAMINS W/ MINERALS TAB SCH TAB: TAB at 09:51

## 2019-10-29 RX ADMIN — ASPIRIN SCH MG: 81 TABLET ORAL at 09:51

## 2019-10-29 RX ADMIN — SODIUM BICARBONATE SCH: 84 INJECTION, SOLUTION INTRAVENOUS at 15:00

## 2019-10-29 RX ADMIN — INSULIN HUMAN PRN UNIT: 100 INJECTION, SOLUTION PARENTERAL at 06:08

## 2019-10-29 RX ADMIN — PIPERACILLIN SODIUM, TAZOBACTAM SODIUM SCH MLS: 2; .25 INJECTION, POWDER, LYOPHILIZED, FOR SOLUTION INTRAVENOUS at 09:50

## 2019-10-29 RX ADMIN — SODIUM BICARBONATE SCH MLS: 84 INJECTION, SOLUTION INTRAVENOUS at 17:52

## 2019-10-29 RX ADMIN — PIPERACILLIN SODIUM, TAZOBACTAM SODIUM SCH MLS: 2; .25 INJECTION, POWDER, LYOPHILIZED, FOR SOLUTION INTRAVENOUS at 17:53

## 2019-10-29 RX ADMIN — INSULIN HUMAN PRN UNIT: 100 INJECTION, SOLUTION PARENTERAL at 20:36

## 2019-10-29 RX ADMIN — HYDROCORTISONE SODIUM SUCCINATE SCH MG: 100 INJECTION, POWDER, FOR SOLUTION INTRAMUSCULAR; INTRAVENOUS at 15:16

## 2019-10-29 RX ADMIN — SODIUM BICARBONATE SCH MLS: 84 INJECTION, SOLUTION INTRAVENOUS at 03:00

## 2019-10-29 RX ADMIN — INSULIN HUMAN PRN UNIT: 100 INJECTION, SOLUTION PARENTERAL at 12:16

## 2019-10-29 RX ADMIN — HYDROCORTISONE SODIUM SUCCINATE SCH MG: 100 INJECTION, POWDER, FOR SOLUTION INTRAMUSCULAR; INTRAVENOUS at 21:24

## 2019-10-29 RX ADMIN — HYDROCORTISONE SODIUM SUCCINATE SCH MG: 100 INJECTION, POWDER, FOR SOLUTION INTRAMUSCULAR; INTRAVENOUS at 09:48

## 2019-10-29 NOTE — PRG
DATE OF SERVICE:  10/29/2019



SUBJECTIVE:  She is awake, alert, appears in no distress.  She did not require

initiation of vasopressors last night. 



OBJECTIVE:  VITAL SIGNS:  Temperature is 98.3, pulse 65, blood pressure 118/67, O2

saturation 96%.  Intake 3153, output 1600.  Weight 253 pounds. 

HEENT:  Unremarkable. 

NECK:  No adenopathy or JVD. 

CHEST:  Clear to auscultation. 

CARDIAC:  S1, S2.  Regular. 

ABDOMEN:  Obese, soft, nontender. 

EXTREMITIES:  No edema.



LABORATORY DATA:  Sodium 132, potassium 4.8, chloride 104, CO2 of 19, BUN 36,

creatinine 2.3, glucose 198, alkaline phosphatase 222.  White blood cell count 12.5,

hematocrit 31, and platelet count 299.  Cortisol level was 7.2. 



ASSESSMENT:  

1. 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

2. Relative adrenal insufficiency.

3. Hypotension at the time of admission.

4. Staphylococcal sepsis.



PLAN:  

1. Continue bicarbonate drip.

2. Continue antibiotics.

3. Continue IV Solu-Cortef.

4. Can likely be transferred back to the floor.  We will discuss with internists.







Job ID:  [id number]

## 2019-10-29 NOTE — PDOC.HOSPP
- Subjective


Encounter Date: 10/29/19


Encounter Time: 12:00


Subjective: 





The patient is doing fine, has no complaints.  Per nurse, noted to have some 

vaginal bleeding. No hypotension overnight, believes  floor nurses were using 

too large of a cuff





- Objective


Vital Signs & Weight: 


 Vital Signs (12 hours)











  Temp


 


 10/29/19 07:00  97.6 F


 


 10/29/19 04:00  98.3 F








 Weight











Admit Weight                   253 lb


 


Weight                         253 lb











 Most Recent Monitor Data











Heart Rate from ECG            55


 


NIBP                           112/65


 


NIBP BP-Mean                   80


 


Respiration from ECG           13


 


SpO2                           98














I&O: 


 











 10/28/19 10/29/19 10/30/19





 06:59 06:59 06:59


 


Intake Total  3153 280


 


Output Total  1600 250


 


Balance  1553 30











Result Diagrams: 


 10/29/19 03:24





 10/29/19 03:24


Additional Labs: 


 Accuchecks











  10/29/19 10/29/19 10/28/19





  11:20 06:05 22:11


 


POC Glucose  217 H  183 H  169 H














  10/28/19





  16:34


 


POC Glucose  95














Hospitalist ROS





- Review of Systems


Constitutional: denies: fever, chills


Eyes: denies: pain, vision change





- Medication


Medications: 


Active Medications











Generic Name Dose Route Start Last Admin





  Trade Name Freq  PRN Reason Stop Dose Admin


 


Acetaminophen  650 mg  10/26/19 23:11  10/29/19 09:50





  Tylenol  PO   650 mg





  Q4H PRN   Administration





  Pain   





     





     





     


 


Aspirin  81 mg  10/27/19 09:00  10/29/19 09:51





  Ecotrin  PO   81 mg





  DAILY DEDE   Administration





     





     





     





     


 


Atorvastatin Calcium  40 mg  10/27/19 21:00  10/28/19 23:08





  Lipitor  PO   Not Given





  HS DEDE   





     





     





     





     


 


Calcitriol  0.5 mcg  10/27/19 09:00  10/29/19 09:50





  Rocaltrol  PO   0.5 mcg





  DAILY DEDE   Administration





     





     





     





     


 


Escitalopram Oxalate  5 mg  10/27/19 09:00  10/29/19 09:50





  Lexapro  PO   5 mg





  DAILY DEDE   Administration





     





     





     





     


 


Hydrocortisone Sodium Succinate  50 mg  10/28/19 21:00  10/29/19 09:48





  Solu-Cortef  IVP   50 mg





  0300,0900,1500,2100 DEDE   Administration





     





     





     





     


 


Piperacillin Sod/Tazobactam  100 mls @ 200 mls/hr  10/28/19 02:00  10/29/19 09:

50





  Sod 2.25 gm/ Sodium Chloride  IVPB   100 mls





  0200,1000,1800 DEDE   Administration





     





     





     





     


 


Sodium Bicarbonate 140 meq/  1,140 mls @ 100 mls/hr  10/28/19 15:30  10/29/19 03

:00





  Dextrose/Water  IVP   1,140 mls





  .[id number] DEDE   Administration





     





     





     





     


 


Insulin Human Regular  0 units  10/26/19 23:45  10/29/19 12:16





  Humulin R  SC   3 unit





  .MILD SLIDING SCALE PRN   Administration





  Mild Correctional Scale   





     





     





     


 


Iron/Minerals/Multivitamins  1 tab  10/27/19 09:00  10/29/19 09:51





  Theragran M  PO   1 tab





  DAILY DEDE   Administration





     





     





     





     


 


Levothyroxine Sodium  100 mcg  10/27/19 06:00  10/29/19 06:03





  Synthroid  PO   100 mcg





  0600 DEDE   Administration





     





     





     





     


 


Lorazepam  0.5 mg  10/27/19 15:21  10/27/19 15:46





  Ativan  PO   0.5 mg





  Q4H PRN   Administration





  Anxiety   





     





     





     


 


Pantoprazole Sodium  40 mg  10/27/19 09:00  10/29/19 09:51





  Protonix  PO   40 mg





  DAILY DEDE   Administration





     





     





     





     














- Exam


General Appearance: NAD, awake alert


Eye: PERRL, anicteric sclera


ENT: normocephalic atraumatic


Neck: supple, symmetric, no JVD, no thyromegaly


Heart: RRR, no murmur, no gallops, no rubs


Respiratory: CTAB, no wheezes, no rales, no ronchi


Gastrointestinal: soft, non-tender, non-distended, normal bowel sounds


Gastrointestinal - other findings: : vaginal bleeding noted 


Extremities: no cyanosis, no clubbing, no edema


Skin: normal turgor, no lesions, no rashes


Neurological: cranial nerve grossly intact, normal sensation to touch, no focal 

deficits, no new deficit


Musculoskeletal: normal tone, normal strength, no muscle wasting


Psychiatric: normal affect, normal behavior, A&O x 3, oriented to person





Hosp A/P





- Plan


Chest X ray: atherosclerosis, stable opacification in the right paratracheal 

region. Stable elevation left hemidiaphragm with increased densities in left 

lung base. Left lower lobe atelectasis/scar favored. Superimposed pneumonia 

could not be excluded. 


CT brain: no acute disease





This is 69 year old female with recently diagnosed hepatic adenocarcinoma who 

was transferred for evaluation of arm jerking








Severe sepsis possibly secondary to gram positive bacteremia vs possible 

pneumonia 


- blood cultures positive 2/2 bottles, but one is contaminant.  Chest  Xray 

shows possible left lung infiltrates. Will continue IV zosyn, discontinue  

vancomycin   








Hypotension - possibly adrenal insufficiency versus sepsis


- started IV hydrocortisone  yesterday 50 mg q6 hours, Will taper to 25 mg q6 

hours. Cortisol level normal but likely not accurate since drawn in afternoon


- ECHO shows grade 1/3 diastolic dysfunction, mild MR. Currently stable, did 

not require pressors. 





Acute encephalopathy secondary to sepsis with hypotension vs stroke vs seizure 

- improved 


- CT head showed on acute disease, MRI unable to be done since patient couldn't 

sit still


- EEG pending,  neuro evaluated patient  








AARON - likely prerenal 


- creatinine improved to 3.16. 


- renal ultrasound unremarkable 








Hyponatremia


- sodium 131, mildly decreased. Continue IV fluids








Hyperkalemia


 - resolved 








Hepatic adenocarcinoma


- per daughter stated that she may be candidate for chemo, was doing better at 

rehab 








Hypothyroidism


- continue levothyroxine








Depression


- hold amitryptyline and lexapro











Dispo: transfer to floor





Code status: full code

## 2019-10-29 NOTE — ULT
Pelvic sonogram transabdominal and transvaginal imaging



HISTORY: Pain. Vaginal bleeding.



FINDINGS: Urinary bladder is decompressed. Uterus has a heterogeneous echotexture and is 6.6 cm. Endo
metrium is poorly visualized. Estimated 0.6 cm. Nabothian cyst at the cervix. Neither ovary is

visualized with transabdominal or transvaginal imaging. No solid or cystic adnexal masses apparent. N
o free fluid.











IMPRESSION: No significant abnormalities are demonstrated



Reported By: MERRICK Mckee 

Electronically Signed:  10/29/2019 3:04 PM

## 2019-10-30 LAB
ANION GAP SERPL CALC-SCNC: 13 MMOL/L (ref 10–20)
BASOPHILS # BLD AUTO: 0 THOU/UL (ref 0–0.2)
BASOPHILS NFR BLD AUTO: 0.1 % (ref 0–1)
BUN SERPL-MCNC: 29 MG/DL (ref 9.8–20.1)
CALCIUM SERPL-MCNC: 7.8 MG/DL (ref 7.8–10.44)
CHLORIDE SERPL-SCNC: 100 MMOL/L (ref 98–107)
CO2 SERPL-SCNC: 26 MMOL/L (ref 23–31)
CREAT CL PREDICTED SERPL C-G-VRATE: 54 ML/MIN (ref 70–130)
EOSINOPHIL # BLD AUTO: 0 THOU/UL (ref 0–0.7)
EOSINOPHIL NFR BLD AUTO: 0 % (ref 0–10)
GLUCOSE SERPL-MCNC: 209 MG/DL (ref 80–115)
HGB BLD-MCNC: 9.8 G/DL (ref 12–16)
LYMPHOCYTES # BLD: 1.4 THOU/UL (ref 1.2–3.4)
LYMPHOCYTES NFR BLD AUTO: 8.3 % (ref 21–51)
MCH RBC QN AUTO: 27.5 PG (ref 27–31)
MCV RBC AUTO: 84.4 FL (ref 78–98)
MONOCYTES # BLD AUTO: 0.5 THOU/UL (ref 0.11–0.59)
MONOCYTES NFR BLD AUTO: 3.1 % (ref 0–10)
NEUTROPHILS # BLD AUTO: 14.5 THOU/UL (ref 1.4–6.5)
NEUTROPHILS NFR BLD AUTO: 88.5 % (ref 42–75)
PLATELET # BLD AUTO: 299 THOU/UL (ref 130–400)
POTASSIUM SERPL-SCNC: 3.7 MMOL/L (ref 3.5–5.1)
RBC # BLD AUTO: 3.57 MILL/UL (ref 4.2–5.4)
SODIUM SERPL-SCNC: 135 MMOL/L (ref 136–145)
WBC # BLD AUTO: 16.4 THOU/UL (ref 4.8–10.8)

## 2019-10-30 RX ADMIN — SODIUM BICARBONATE SCH MLS: 84 INJECTION, SOLUTION INTRAVENOUS at 06:14

## 2019-10-30 RX ADMIN — ASPIRIN SCH MG: 81 TABLET ORAL at 09:33

## 2019-10-30 RX ADMIN — MULTIPLE VITAMINS W/ MINERALS TAB SCH TAB: TAB at 09:34

## 2019-10-30 RX ADMIN — HYDROCORTISONE SODIUM SUCCINATE SCH MG: 100 INJECTION, POWDER, FOR SOLUTION INTRAMUSCULAR; INTRAVENOUS at 09:35

## 2019-10-30 RX ADMIN — PIPERACILLIN SODIUM, TAZOBACTAM SODIUM SCH MLS: 2; .25 INJECTION, POWDER, LYOPHILIZED, FOR SOLUTION INTRAVENOUS at 03:32

## 2019-10-30 RX ADMIN — PIPERACILLIN SODIUM, TAZOBACTAM SODIUM SCH MLS: 2; .25 INJECTION, POWDER, LYOPHILIZED, FOR SOLUTION INTRAVENOUS at 18:44

## 2019-10-30 RX ADMIN — SODIUM BICARBONATE SCH: 84 INJECTION, SOLUTION INTRAVENOUS at 04:00

## 2019-10-30 RX ADMIN — INSULIN HUMAN PRN UNIT: 100 INJECTION, SOLUTION PARENTERAL at 06:12

## 2019-10-30 RX ADMIN — HYDROCORTISONE SODIUM SUCCINATE SCH MG: 100 INJECTION, POWDER, FOR SOLUTION INTRAMUSCULAR; INTRAVENOUS at 03:32

## 2019-10-30 RX ADMIN — INSULIN HUMAN PRN UNIT: 100 INJECTION, SOLUTION PARENTERAL at 11:21

## 2019-10-30 RX ADMIN — PIPERACILLIN SODIUM, TAZOBACTAM SODIUM SCH MLS: 2; .25 INJECTION, POWDER, LYOPHILIZED, FOR SOLUTION INTRAVENOUS at 09:34

## 2019-10-30 NOTE — CON
DATE OF CONSULTATION:  



REASON FOR CONSULT:  Liver adenocarcinoma.



HISTORY OF PRESENT ILLNESS:  Ms. Gomez is a 69-year-old  female 
with a

large liver lesion positive for adenocarcinoma, who presented to the emergency 
room

on this visit with altered mental status.  She was diagnosed with urinary tract

infection, has improved over the course of the hospital stay.  She was 
discharged

from this facility on October 17, where she was admitted for altered mental 
status

and hypotension.  She has been undergoing treatment for a liver lesion, that was

thought to be an abscess.  She underwent a biopsy on September 11, that returned

adenocarcinoma, was consistent with biliary tract cancer such as 
cholangiocarcinoma.

 Her CA-19-9 was elevated over 6000.  Her CEA was elevated at 27.  Her last CT 
scan

on October 10, which once again showed the increasing liver lesion measuring at 
that

time 8.8 x 11.7 cm.  She also had enlarging bilateral adrenal gland masses.  
There

was a 3.7 cm low attenuating focus between the posterior fundus of the stomach 
and

the left adrenal gland of unclear significance, but possibly low-attenuation 
mass.

She has been in and out of the hospital in the swing bed over the last several

months.  She has severe deconditioning and is unable to perform any of her ADLs.

She has not been out of bed for several weeks.  She was due to be seen in our 
clinic

yesterday to discuss diagnosis, prognosis, and for possible treatment.  We were

asked to see her for our recommendations. 



PAST MEDICAL HISTORY:  

1. Liver adenocarcinoma.

2. Congestive heart failure.

3. Hypertension.

4. COPD.

5. Dementia.

6. Chronic renal insufficiency.

7. Diabetes.

8. Hypothyroidism.

9. Deconditioning.



PAST SURGICAL HISTORY:  

1. Knee replacement.

2. Liver biopsy.



ALLERGIES:  NO KNOWN DRUG ALLERGIES.



CURRENT MEDICATIONS:  

1. Proventil.

2. Ecotrin.

3. Lipitor.

4. Calcitriol.

5. Colace.

6. Lexapro.

7. Solu-Cortef.

8. Insulin.

9. Synthroid.

10. Protonix.

11. Antibiotics.



FAMILY HISTORY:  Mother had liver cancer.



SOCIAL HISTORY:  She is a resident of [facility].  No 
alcohol,

tobacco, or illicit drug use. 



REVIEW OF SYSTEMS:  The patient denies any complaints.



PHYSICAL EXAMINATION:

VITAL SIGNS:  Temperature 97.6, pulse is 59, respiratory rate 16, and BP is 125/
67.

She is 98% on room air. 

GENERAL:  This is an obese female, in no acute distress. 

HEENT:  Normocephalic and atraumatic. 

NECK:  Supple. 

CVS:  Regular rate and rhythm. 

LUNGS:  Clear anterior. 

ABDOMEN:  Obese. 

NEUROLOGIC:  She is alert and oriented.



PERTINENT LABS AND X-RAYS:  Current WBCs are 16.4, hemoglobin 9.8, hematocrit 
30.2,

and platelet count is 299,000.  She has 88% neutrophils and 8% lymphocytes.  
Sodium

is 135, potassium 3.7, chloride 100, CO2 is 26, BUN is 29, creatinine 1.83, 
calcium

is 7.8, bilirubin 0.4, AST is 23, ALT is 9, and alkaline phosphatase is 222.

Troponin is negative and BNP is 123.  Serum total protein 6.1, albumin 2.5, and

globulin 3.6.  CEA on October 11 was 27 and CA-19-9 is 6490. 



ASSESSMENT:  

1. 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

2. 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

3. Severe deconditioning.



DISCUSSION:  Case was discussed with the patient and her sister at bedside.  
There

was also another sister on the telephone.  The patient has now become so weak 
that

she is unable to perform any of her ADLs.  She needs full assistance with 
eating and

turning.  At this point based on her performance status, she is not a candidate 
for

any chemotherapy.  It is possible that all treatment will be palliative, this 
was

discussed at length with her sister's.  Recommendation is focusing on increasing

strength and comfort.  I have asked the Palliative Care to see the patient 
regarding

advance directives and power of .  Family was provided our clinic

information to pursue treatment if she should improve.  Case was discussed with 
Dr. Jeter. 



Thank you for the consult.







Job ID:  [id number]



[facility]

## 2019-10-30 NOTE — PDOC.HOSPP
- Subjective


Encounter Date: 10/30/19


Encounter Time: 12:00


Subjective: 





Patient alert and oriented to place. No issues with hypotension.  Bicarb drip 

weaned yesterday. Had BM yesterday. Patient still having trouble having a bowel 

movement. 





She still has a tremor on right arm and is unable to touch her nose with her 

right arm. 





Patient has vaginal bleeding as well. PEr family this is normal for her. SHe 

had evaluation [facility] and they did [place] and she was told 

she had no cancer there. 








- Objective


Vital Signs & Weight: 


 Vital Signs (12 hours)











  Temp Pulse Ox


 


 10/30/19 08:00  97.6 F 


 


 10/30/19 07:42   98


 


 10/30/19 04:00  98.4 F 


 


 10/30/19 01:00  97.7 F 








 Weight











Admit Weight                   253 lb


 


Weight                         253 lb











 Most Recent Monitor Data











Heart Rate from ECG            59


 


NIBP                           125/67


 


NIBP BP-Mean                   86


 


Respiration from ECG           14


 


SpO2                           98














I&O: 


 











 10/29/19 10/30/19 10/31/19





 06:59 06:59 06:59


 


Intake Total 3153 3227 240


 


Output Total 1600 1085 450


 


Balance 1553 2142 -210











Result Diagrams: 


 10/30/19 04:11





 10/30/19 08:31


Additional Labs: 


 Accuchecks











  10/30/19 10/30/19 10/29/19





  11:20 06:15 20:38


 


POC Glucose  229 H  199 H  197 H














  10/29/19





  16:02


 


POC Glucose  196 H














Hospitalist ROS





- Review of Systems


Constitutional: denies: fever, chills


Respiratory: denies: cough, pleuritic pain





- Medication


Medications: 


Active Medications











Generic Name Dose Route Start Last Admin





  Trade Name Freq  PRN Reason Stop Dose Admin


 


Acetaminophen  650 mg  10/26/19 23:11  10/30/19 06:09





  Tylenol  PO   650 mg





  Q4H PRN   Administration





  Pain   





     





     





     


 


Aspirin  81 mg  10/27/19 09:00  10/30/19 09:33





  Ecotrin  PO   81 mg





  DAILY DEDE   Administration





     





     





     





     


 


Atorvastatin Calcium  40 mg  10/27/19 21:00  10/29/19 20:22





  Lipitor  PO   40 mg





  HS DEDE   Administration





     





     





     





     


 


Calcitriol  0.5 mcg  10/27/19 09:00  10/30/19 09:34





  Rocaltrol  PO   0.5 mcg





  DAILY DEDE   Administration





     





     





     





     


 


Docusate Sodium  100 mg  10/26/19 23:11  10/29/19 16:01





  Colace  PO   100 mg





  DAILY PRN   Administration





  Constipation   





     





     





     


 


Escitalopram Oxalate  5 mg  10/27/19 09:00  10/30/19 09:34





  Lexapro  PO   5 mg





  DAILY DEDE   Administration





     





     





     





     


 


Hydrocortisone Sodium Succinate  25 mg  10/29/19 15:00  10/30/19 09:35





  Solu-Cortef  IVP   25 mg





  0300,0900,1500,2100 DEDE   Administration





     





     





     





     


 


Piperacillin Sod/Tazobactam  100 mls @ 200 mls/hr  10/28/19 02:00  10/30/19 09:

34





  Sod 2.25 gm/ Sodium Chloride  IVPB   100 mls





  0200,1000,1800 DEDE   Administration





     





     





     





     


 


Insulin Human Regular  0 units  10/26/19 23:45  10/30/19 11:21





  Humulin R  SC   3 unit





  .MILD SLIDING SCALE PRN   Administration





  Mild Correctional Scale   





     





     





     


 


Iron/Minerals/Multivitamins  1 tab  10/27/19 09:00  10/30/19 09:34





  Theragran M  PO   1 tab





  DAILY DEDE   Administration





     





     





     





     


 


Levothyroxine Sodium  100 mcg  10/27/19 06:00  10/30/19 06:09





  Synthroid  PO   100 mcg





  0600 DEDE   Administration





     





     





     





     


 


Pantoprazole Sodium  40 mg  10/27/19 09:00  10/30/19 09:34





  Protonix  PO   40 mg





  DAILY DEDE   Administration





     





     





     





     














- Exam


General Appearance: NAD, awake alert


Eye: PERRL, anicteric sclera


ENT: normocephalic atraumatic, no oropharyngeal lesions


Neck: supple, symmetric, no JVD, no thyromegaly


Heart: RRR, no murmur, no gallops, no rubs


Respiratory: CTAB, no wheezes, no rales, no ronchi


Gastrointestinal: soft, non-tender, non-distended, no palpable masses


Extremities: no cyanosis, no clubbing, no edema


Skin: normal turgor, no lesions, no rashes


Neurological: cranial nerve grossly intact, normal sensation to touch, no 

weakness, no focal deficits, no new deficit


Neurological - other findings: patient with intention tremor right arm. Cant 

touch finger to nose 


Musculoskeletal: normal tone, normal strength, no muscle wasting


Musculoskeletal - other findings: 2+ pitting edema


Psychiatric: normal affect, normal behavior, A&O x 3





Hosp A/P





- Plan


Chest X ray: atherosclerosis, stable opacification in the right paratracheal 

region. Stable elevation left hemidiaphragm with increased densities in left 

lung base. Left lower lobe atelectasis/scar favored. Superimposed pneumonia 

could not be excluded. 


CT brain: no acute disease





This is 69 year old female with recently diagnosed hepatic adenocarcinoma who 

was transferred for evaluation of arm jerking








Severe sepsis possibly secondary to gram positive bacteremia vs possible 

pneumonia 


- blood cultures positive 2/2 bottles, but one is contaminant.  Chest  Xray 

shows possible left lung infiltrates. 


- continue IV zosyn, vancomycin discontinued  








Hypotension - possibly adrenal insufficiency versus sepsis


- presented with hyponatremia and hyperkalemia. Had hypotension multiple times 

after 4L bolus of fluid. Lactate normal. 


- started IV hydrocortisone 10/28. Tapered to 25 mg q6 hours 10/30.  Will try 

10 mg hydrocortisone in morning, 5 mg in afternoon. Cortisol level normal but 

likely not accurate since drawn in afternoon.  Consider ACTH stimulation testing


- ECHO shows grade 1/3 diastolic dysfunction, mild MR. Currently stable, did 

not require pressors. 





Acute metabolic  encephalopathy secondary to sepsis+ metabolic acidosis  


- CT head showed on acute disease, MRI unable to be done since patient couldn't 

sit still


- EEG done, report pending. Per tech tremors did not correlate to seizure 

activity  . UA negative 


- ABG showed pH 7.22, bicarbonate of 18. Was started on bicarbonate drip, 

weaned down yesterday. Will repeat ABG to see if acidosis resolved. 








AARON - likely prerenal 


- creatinine improved to 1.83 


- renal ultrasound unremarkable 








Hepatic adenocarcinoma


- oncology and palliative care evaluated patient. Patient not candidate for 

chemo at this time





Hyponatremia


- sodium 131, mildly decreased. Continue IV fluids








Hyperkalemia


 - resolved 











Hypothyroidism


- continue levothyroxine








Depression


- hold amitryptyline and lexapro











Dispo: transfer to floor





Code status: full code

## 2019-10-30 NOTE — PDOC.PALCO
Palliative Care Consult





- Consult Details


Requesting Physician: Lizet DILLARD


Reason for Consult: goals of care, advance directives assistance


Family Members Present: Sister Allison





- Pertinent HPI





69 year old female who was at [facility] rehab to gain strength to have chemo for 

palliation of a liver adenocarcinoma that present to the emergency room for 

altered mental status and jerking in her extremities. Onset was over a couple 

of hours with initial symptom altered mental status. Evaluation in the 

emergency room patient was identified to have sepsis from UTI, encephalopathy 

and admitted for medical management to CCU. Oncology met with patient and 

family  today and informed them that she is not strong enough at this time for 

chemo.   





- Pertinent PMH





CHF, hypertension, COPD, Dementia, Renal disease, diabetes, hypothyroid, 

hepatocarcinoma





- Social History


Smoking Status: Never smoker


Smoking: no tobacco exposure


Alcohol Use: none


Drug Use History: none


Living Situation: nursing home resident





- Medications


MAR Reviewed: Yes





- Allergies


Allergies/Adverse Reactions: 


 Allergies











Allergy/AdvReac Type Severity Reaction Status Date / Time


 


TAPE Allergy  Rash Uncoded 06/27/19 22:05














- Subjective





Resting in bed with sister Allison at bedside. Intermittently would drift to a 

light sleep but awaken easily. Lethargic, but oriented and aware of current 

health condition. Denies specific complaints when assessed. 


ROS: 10 point review negative





- Objective


Vital Signs: 


 Vital Signs - Most Recent











Temp Pulse Resp BP Pulse Ox


 


 97.6 F   97   17   60/41 L  98 


 


 10/30/19 08:00  10/28/19 11:32  10/28/19 11:32  10/28/19 13:47  10/30/19 07:42











Palliative Performance Scale: 30





- Physical Exam


Deviation from normal: weak, requires support in bed to maintian position


HEENT: moist MMs, sclera anicteric


Respiratory: clear to auscultation bilateral, unlabored breathing


Cardiovascular: RRR


Gastrointestinal: soft, positive bowel sounds


Musculoskeletal: edema present


Deviation from normal: radial +2, difficult to palpate pedal


Neurological: moves all 4 limbs


Deviation from normal: lethargic but oriented


Skin: cap refill <2 seconds


Deviation from normal: Pallor to feet





- Problem List


(1) Palliative care encounter


Code(s): Z51.5 - ENCOUNTER FOR PALLIATIVE CARE   Current Visit: Yes   Status: 

Acute   





(2) Physical deconditioning


Code(s): R53.81 - OTHER MALAISE   Current Visit: Yes   Status: Acute   





(3) Adenocarcinoma of liver


Code(s): C22.9 - MALIG NEOPLASM OF LIVER, NOT SPECIFIED AS PRIMARY OR SEC   

Current Visit: No   Status: Acute   





(4) Sepsis


Code(s): A41.9 - SEPSIS, UNSPECIFIED ORGANISM   Current Visit: No   Status: 

Acute   





- Plan/Recommendations


Plan:


MELIA Billingsley RN and BRAYAN James with [facility] also present. Sister Allison at 

bedside. Although patient lethargic capable of decisional capacity in relation 

to assigning MPOA. Aware of Cancer and terminal nature. 





*Mpoa patient sister Allison


*Patient son will be here Friday/patient desires to wait for his visit to 

discuss resuscitation status


*Initial conversation was that they would like to go to a skilled facility as 

[facility] will no longer be able to care for her increased care requirements


*Patient and sister have had a good experience with hospice and are interested 

in that as an option for care.




















[45] minutes spent on this encounter with >50% of the time in counseling and 

coordination of care.





Thank you for this very appropriate consult.

## 2019-10-30 NOTE — PRG
DATE OF SERVICE:  10/30/2019



SUBJECTIVE:  She is doing well, has no acute complaints.



OBJECTIVE:  VITAL SIGNS:  Temperature is 98.4, pulse 57, blood pressure 105/72.

24-hour intake 3227, output 1085. 

HEENT:  Unremarkable. 

NECK:  No adenopathy or JVD. 

LUNGS:  Clear to auscultation. 

CARDIAC:  S1, S2.  Regular. 

ABDOMEN:  Obese, soft and nontender. 

EXTREMITIES:  No clubbing, cyanosis, or edema.



LABORATORY DATA:  Chemistry is pending.  White blood cell count 16.4, hematocrit

30.2, platelet count 299. 



ASSESSMENT:  

1. Metabolic encephalopathy, which has resolved.  Her mental status seems back to

normal. 

2. 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

3. Relative adrenal insufficiency.

4. Hypotension at the time of admission, which has now resolved.

5. Staphylococcal sepsis.



PLAN:  

1. Check a chemistry.  If her serum bicarbonate level is normal or near normal, then

the bicarbonate drip can be stopped. 

2. Continue antibiotics.

3. Continue Solu-Cortef.

4. Can be transferred back to the floor.







Job ID:  [id number]

## 2019-10-31 LAB
ANION GAP SERPL CALC-SCNC: 14 MMOL/L (ref 10–20)
BASOPHILS # BLD AUTO: 0 THOU/UL (ref 0–0.2)
BASOPHILS NFR BLD AUTO: 0.1 % (ref 0–1)
BUN SERPL-MCNC: 30 MG/DL (ref 9.8–20.1)
CALCIUM SERPL-MCNC: 7.9 MG/DL (ref 7.8–10.44)
CHLORIDE SERPL-SCNC: 101 MMOL/L (ref 98–107)
CO2 SERPL-SCNC: 28 MMOL/L (ref 23–31)
CREAT CL PREDICTED SERPL C-G-VRATE: 59 ML/MIN (ref 70–130)
EOSINOPHIL # BLD AUTO: 0 THOU/UL (ref 0–0.7)
EOSINOPHIL NFR BLD AUTO: 0 % (ref 0–10)
GLUCOSE SERPL-MCNC: 138 MG/DL (ref 80–115)
HGB BLD-MCNC: 9.7 G/DL (ref 12–16)
LYMPHOCYTES # BLD: 1.8 THOU/UL (ref 1.2–3.4)
LYMPHOCYTES NFR BLD AUTO: 11.6 % (ref 21–51)
MCH RBC QN AUTO: 26.5 PG (ref 27–31)
MCV RBC AUTO: 84 FL (ref 78–98)
MONOCYTES # BLD AUTO: 0.8 THOU/UL (ref 0.11–0.59)
MONOCYTES NFR BLD AUTO: 5.2 % (ref 0–10)
NEUTROPHILS # BLD AUTO: 12.7 THOU/UL (ref 1.4–6.5)
NEUTROPHILS NFR BLD AUTO: 83 % (ref 42–75)
PLATELET # BLD AUTO: 260 THOU/UL (ref 130–400)
POTASSIUM SERPL-SCNC: 3.5 MMOL/L (ref 3.5–5.1)
RBC # BLD AUTO: 3.65 MILL/UL (ref 4.2–5.4)
SODIUM SERPL-SCNC: 139 MMOL/L (ref 136–145)
WBC # BLD AUTO: 15.3 THOU/UL (ref 4.8–10.8)

## 2019-10-31 RX ADMIN — INSULIN HUMAN PRN UNIT: 100 INJECTION, SOLUTION PARENTERAL at 06:14

## 2019-10-31 RX ADMIN — ASPIRIN SCH MG: 81 TABLET ORAL at 09:09

## 2019-10-31 RX ADMIN — PIPERACILLIN SODIUM, TAZOBACTAM SODIUM SCH MLS: 2; .25 INJECTION, POWDER, LYOPHILIZED, FOR SOLUTION INTRAVENOUS at 09:09

## 2019-10-31 RX ADMIN — PIPERACILLIN SODIUM, TAZOBACTAM SODIUM SCH MLS: 2; .25 INJECTION, POWDER, LYOPHILIZED, FOR SOLUTION INTRAVENOUS at 02:14

## 2019-10-31 RX ADMIN — MULTIPLE VITAMINS W/ MINERALS TAB SCH TAB: TAB at 09:10

## 2019-10-31 NOTE — PDOC.HOSPP
- Subjective


Encounter Date: 10/31/19


Encounter Time: 19:00


Subjective: 





The patient states that she has no complaints other than some buttock pain.  

Per nurse only mild skin tear but no pressure ulcer. Patient had a large bowel 

movement this am. 





Has chronic vaginal bleeding per relative.   Patient aware she is in hospital, 

states she is tired of suffering








Complains of right shoulder pain also, states that she may have had it for 

years but doesn't know why. Denies any stroke





- Objective


Vital Signs & Weight: 


 Vital Signs (12 hours)











  Temp Pulse Pulse Resp BP BP Pulse Ox


 


 10/31/19 20:00        97


 


 10/31/19 19:37  97.8 F  65   18   122/69  95


 


 10/31/19 16:00  97.8 F  76   16   117/62  96


 


 10/31/19 13:30    76   159/90 H  


 


 10/31/19 12:08  97.4 F L  76   16   139/73  96














  Pulse Ox


 


 10/31/19 20:00 


 


 10/31/19 19:37 


 


 10/31/19 16:00 


 


 10/31/19 13:30  99


 


 10/31/19 12:08 








 Weight











Admit Weight                   253 lb


 


Weight                         253 lb











 Most Recent Monitor Data











Heart Rate from ECG            61


 


NIBP                           111/57


 


NIBP BP-Mean                   75


 


Respiration from ECG           16


 


SpO2                           97














I&O: 


 











 10/30/19 10/31/19 11/01/19





 06:59 06:59 06:59


 


Intake Total 3227 1578 


 


Output Total 1085 1225 600


 


Balance 2142 353 -600











Result Diagrams: 


 10/31/19 05:10





 10/31/19 05:10


Additional Labs: 


 Accuchecks











  10/31/19 10/31/19 10/31/19





  19:42 16:08 12:14


 


POC Glucose  119 H  148 H  105














  10/31/19





  04:29


 


POC Glucose  178 H














Hospitalist ROS





- Review of Systems


Respiratory: denies: cough, SOB with excertion





- Medication


Medications: 


Active Medications











Generic Name Dose Route Start Last Admin





  Trade Name Freq  PRN Reason Stop Dose Admin


 


Acetaminophen  650 mg  10/26/19 23:11  10/31/19 12:15





  Tylenol  PO   650 mg





  Q4H PRN   Administration





  Pain   





     





     





     


 


Aspirin  81 mg  10/27/19 09:00  10/31/19 09:09





  Ecotrin  PO   81 mg





  DAILY DEDE   Administration





     





     





     





     


 


Atorvastatin Calcium  40 mg  10/27/19 21:00  10/31/19 20:33





  Lipitor  PO   40 mg





  HS DEDE   Administration





     





     





     





     


 


Calcitriol  0.5 mcg  10/27/19 09:00  10/31/19 09:09





  Rocaltrol  PO   0.5 mcg





  DAILY DEDE   Administration





     





     





     





     


 


Docusate Sodium  100 mg  10/26/19 23:11  10/31/19 12:15





  Colace  PO   100 mg





  DAILY PRN   Administration





  Constipation   





     





     





     


 


Escitalopram Oxalate  5 mg  10/27/19 09:00  10/31/19 09:13





  Lexapro  PO   Not Given





  DAILY DEDE   





     





     





     





     


 


Hydrocortisone  5 mg  10/30/19 21:00  10/31/19 20:33





  Cortef  PO   5 mg





  QPM DEDE   Administration





     





     





     





     


 


Piperacillin Sod/Tazobactam  100 mls @ 200 mls/hr  10/31/19 16:00  10/31/19 20:

34





  Sod 3.375 gm/ Sodium Chloride  IVPB   100 mls





  0400,1000,1600,2200 DEDE   Administration





     





     





     





     


 


Insulin Human Regular  0 units  10/26/19 23:45  10/31/19 06:14





  Humulin R  SC   2 unit





  .MILD SLIDING SCALE PRN   Administration





  Mild Correctional Scale   





     





     





     


 


Iron/Minerals/Multivitamins  1 tab  10/27/19 09:00  10/31/19 09:10





  Theragran M  PO   1 tab





  DAILY DEDE   Administration





     





     





     





     


 


Levothyroxine Sodium  100 mcg  10/27/19 06:00  10/31/19 05:33





  Synthroid  PO   100 mcg





  0600 DEDE   Administration





     





     





     





     


 


Pantoprazole Sodium  40 mg  10/27/19 09:00  10/31/19 09:10





  Protonix  PO   40 mg





  DAILY DEDE   Administration





     





     





     





     














- Exam


General Appearance: NAD, awake alert


General - other findings: Morbidly obese


Eye: PERRL, anicteric sclera


Eye - other findings: glassy appearance to bilateral eyes 


Neck: supple, no JVD


Heart: RRR, no murmur, no gallops, no rubs


Respiratory: CTAB, no wheezes, no rales, no ronchi


Gastrointestinal: soft, non-tender, non-distended


Extremities: no cyanosis, no clubbing, no edema


Skin: normal turgor, no lesions


Neurological: no weakness, no focal deficits


Musculoskeletal: normal strength


Musculoskeletal - other findings: difficulty lifting up right shoulder+ pain 


Psychiatric: normal behavior, oriented to place





Hosp A/P





- Plan


Chest X ray: atherosclerosis, stable opacification in the right paratracheal 

region. Stable elevation left hemidiaphragm with increased densities in left 

lung base. Left lower lobe atelectasis/scar favored. Superimposed pneumonia 

could not be excluded. 


CT brain: no acute disease





This is 69 year old female with recently diagnosed hepatic adenocarcinoma who 

was transferred for evaluation of arm jerking








Severe sepsis possibly secondary to gram positive bacteremia vs possible 

pneumonia 


- blood cultures positive 2/2 bottles, but one is contaminant.  Chest  Xray 

shows possible left lung infiltrates. 


- s/p 4 days zosyn. Vanc discontinued. Switch to oral augmentin 





Hypotension - possibly adrenal insufficiency versus sepsis


- presented with hyponatremia and hyperkalemia. Had hypotension multiple times 

after 4L bolus of fluid. Lactate normal. 


- tapered IV hydrocortisone to 5 mg qpm. No evidence of hypotension 


- ECHO shows grade 1/3 diastolic dysfunction, mild MR. Currently stable, did 

not require pressors. 





Acute metabolic  encephalopathy secondary to sepsis+ metabolic acidosis  


- CT head showed on acute disease, MRI unable to be done since patient couldn't 

sit still


- EEG  report pending. Per tech tremors did not correlate to seizure activity  

. UA negative 


- ABG showed pH 7.22, bicarbonate of 18. Was on bicarb drip, discontinued





AARON - likely prerenal 


- creatinine improved to 1.67, down to baseline.


- d/c fluids 


- renal ultrasound unremarkable 








Hepatic adenocarcinoma


- oncology and palliative care evaluated patient. Patient not candidate for 

chemo at this time





Hyponatremia- improved


- currently 139








Hyperkalemia


 - resolved 











Hypothyroidism


- continue levothyroxine








Depression


- hold amitryptyline and lexapro











Dispo: possibly d/c tavo





Code status: full code

## 2019-11-01 LAB
ANION GAP SERPL CALC-SCNC: 13 MMOL/L (ref 10–20)
BUN SERPL-MCNC: 25 MG/DL (ref 9.8–20.1)
CALCIUM SERPL-MCNC: 7.8 MG/DL (ref 7.8–10.44)
CHLORIDE SERPL-SCNC: 102 MMOL/L (ref 98–107)
CO2 SERPL-SCNC: 28 MMOL/L (ref 23–31)
CREAT CL PREDICTED SERPL C-G-VRATE: 73 ML/MIN (ref 70–130)
GLUCOSE SERPL-MCNC: 103 MG/DL (ref 80–115)
HGB BLD-MCNC: 10 G/DL (ref 12–16)
MCH RBC QN AUTO: 26.6 PG (ref 27–31)
MCV RBC AUTO: 86.5 FL (ref 78–98)
PLATELET # BLD AUTO: 221 THOU/UL (ref 130–400)
POTASSIUM SERPL-SCNC: 3.4 MMOL/L (ref 3.5–5.1)
RBC # BLD AUTO: 3.75 MILL/UL (ref 4.2–5.4)
SODIUM SERPL-SCNC: 140 MMOL/L (ref 136–145)
WBC # BLD AUTO: 12.4 THOU/UL (ref 4.8–10.8)

## 2019-11-01 RX ADMIN — AMOXICILLIN AND CLAVULANATE POTASSIUM SCH MG: 875; 125 TABLET, FILM COATED ORAL at 08:40

## 2019-11-01 RX ADMIN — MULTIPLE VITAMINS W/ MINERALS TAB SCH TAB: TAB at 08:39

## 2019-11-01 RX ADMIN — ASPIRIN SCH MG: 81 TABLET ORAL at 08:40

## 2019-11-01 RX ADMIN — AMOXICILLIN AND CLAVULANATE POTASSIUM SCH MG: 875; 125 TABLET, FILM COATED ORAL at 19:56

## 2019-11-01 NOTE — PDOC.HOSPP
- Subjective


Encounter Date: 11/01/19


Encounter Time: 22:00


Subjective: 





Patient doing well with no complaints.  Has no headache, dizziness, or 

abdominal pain. Unable to answer too many questions. 





States "tired of suffering"





- Objective


Vital Signs & Weight: 


 Vital Signs (12 hours)











  Temp Pulse Resp BP BP Pulse Ox


 


 11/01/19 20:00  97.4 F L  91  18   107/64  94 L


 


 11/01/19 16:12  97.6 F  74  18  143/79 H   97


 


 11/01/19 11:16  97.6 F  75  20  152/83 H   95








 Weight











Admit Weight                   253 lb


 


Weight                         253 lb











 Most Recent Monitor Data











Heart Rate from ECG            61


 


NIBP                           111/57


 


NIBP BP-Mean                   75


 


Respiration from ECG           16


 


SpO2                           97














I&O: 


 











 10/31/19 11/01/19 11/02/19





 06:59 06:59 06:59


 


Intake Total 1578  450


 


Output Total 1225 600 450


 


Balance 353 -600 0











Result Diagrams: 


 11/01/19 08:41





 11/01/19 08:41


Additional Labs: 


 Accuchecks











  11/01/19 11/01/19 11/01/19





  19:06 16:14 10:46


 


POC Glucose  143 H  122 H  103














  11/01/19





  04:40


 


POC Glucose  108














Hospitalist ROS





- Review of Systems


Constitutional: denies: fever, chills





- Medication


Medications: 


Active Medications











Generic Name Dose Route Start Last Admin





  Trade Name Freq  PRN Reason Stop Dose Admin


 


Acetaminophen  650 mg  10/26/19 23:11  11/01/19 19:56





  Tylenol  PO   650 mg





  Q4H PRN   Administration





  Pain   





     





     





     


 


Amoxicillin/Clavulanate Potassium  875 mg  11/01/19 09:00  11/01/19 19:56





  Augmentin  PO   875 mg





  Q12HR DEDE   Administration





     





     





     





     


 


Aspirin  81 mg  10/27/19 09:00  11/01/19 08:40





  Ecotrin  PO   81 mg





  DAILY DEDE   Administration





     





     





     





     


 


Atorvastatin Calcium  40 mg  10/27/19 21:00  11/01/19 19:56





  Lipitor  PO   40 mg





  HS DEDE   Administration





     





     





     





     


 


Calcitriol  0.5 mcg  10/27/19 09:00  11/01/19 08:39





  Rocaltrol  PO   0.5 mcg





  DAILY DEDE   Administration





     





     





     





     


 


Docusate Sodium  100 mg  10/26/19 23:11  10/31/19 12:15





  Colace  PO   100 mg





  DAILY PRN   Administration





  Constipation   





     





     





     


 


Escitalopram Oxalate  5 mg  10/27/19 09:00  11/01/19 08:40





  Lexapro  PO   5 mg





  DAILY DEDE   Administration





     





     





     





     


 


Hydrocortisone  5 mg  10/30/19 21:00  11/01/19 19:56





  Cortef  PO   5 mg





  QPM DEDE   Administration





     





     





     





     


 


Insulin Human Regular  0 units  10/26/19 23:45  10/31/19 06:14





  Humulin R  SC   2 unit





  .MILD SLIDING SCALE PRN   Administration





  Mild Correctional Scale   





     





     





     


 


Iron/Minerals/Multivitamins  1 tab  10/27/19 09:00  11/01/19 08:39





  Theragran M  PO   1 tab





  DAILY DEDE   Administration





     





     





     





     


 


Levothyroxine Sodium  100 mcg  10/27/19 06:00  11/01/19 06:14





  Synthroid  PO   100 mcg





  0600 DEDE   Administration





     





     





     





     


 


Pantoprazole Sodium  40 mg  10/27/19 09:00  11/01/19 08:40





  Protonix  PO   40 mg





  DAILY DEDE   Administration





     





     





     





     














- Exam


General Appearance: NAD, awake alert


General - other findings: Morbidly obese


Eye: PERRL, anicteric sclera


ENT: normocephalic atraumatic, no oropharyngeal lesions, dry oral mucosa


Neck: supple, symmetric, no JVD, no thyromegaly


Heart: RRR, no murmur, no gallops, no rubs


Respiratory: CTAB, no wheezes, no rales, no ronchi


Gastrointestinal: soft, non-tender, non-distended, normal bowel sounds


Extremities: no cyanosis, no clubbing, no edema


Skin: normal turgor, no lesions, no rashes


Neurological: cranial nerve grossly intact, normal sensation to touch, no focal 

deficits, no new deficit


Musculoskeletal: normal tone, normal strength, no muscle wasting


Psychiatric: normal affect, normal behavior, A&O x 3, oriented to person





Hosp A/P





- Plan


Chest X ray: atherosclerosis, stable opacification in the right paratracheal 

region. Stable elevation left hemidiaphragm with increased densities in left 

lung base. Left lower lobe atelectasis/scar favored. Superimposed pneumonia 

could not be excluded. 


CT brain: no acute disease





This is 69 year old female with recently diagnosed hepatic adenocarcinoma who 

was transferred for evaluation of arm jerking








Severe sepsis possibly secondary to possible pneumonia 


- blood cultures positive 2/2 bottles, but both growing contaminants.  Chest  

Xray shows possible left lung infiltrates. 


- s/p 4 days zosyn. Vanc discontinued. On augmentin day 5 of antibiotics. 

Continue for two more days 





Hypotension - possibly adrenal insufficiency versus sepsis


- presented with hyponatremia and hyperkalemia. Had hypotension multiple times 

after 4L bolus of fluid. Lactate normal. 


- tapered IV hydrocortisone to oral 5 mg qpm. No evidence of hypotension 


- ECHO shows grade 1/3 diastolic dysfunction, mild MR. Currently stable, did 

not require pressors. 





Acute metabolic  encephalopathy secondary to sepsis+ metabolic acidosis  


- CT head showed on acute disease, MRI unable to be done since patient couldn't 

sit still


- EEG  report pending. Per tech tremors did not correlate to seizure activity  

. UA negative 


- ABG showed pH 7.22, bicarbonate of 18. Discontinued bicarb drip 





AARON - likely prerenal 


- creatinine improved to 1.37, resolving  


- renal ultrasound unremarkable 








Hepatic adenocarcinoma


- oncology and palliative care evaluated patient. Patient not candidate for 

chemo at this time


- hospice consult placed  and palliative following 








Vaginal bleeding - chronic


- transvaginal ultrasound showed no abnormalities. Nabothian cyst


- had normal pap smear at [facility]





Hypothyroidism


- continue levothyroxine








Hyponatremia- resolved








Hyperkalemia


 - resolved 











Depression


- hold amitryptyline and lexapro











Dispo:  needs placement in SNF as [facility] will no longer take her vs hospice


Code status: full code

## 2019-11-02 LAB
ANION GAP SERPL CALC-SCNC: 14 MMOL/L (ref 10–20)
BUN SERPL-MCNC: 19 MG/DL (ref 9.8–20.1)
CALCIUM SERPL-MCNC: 7.7 MG/DL (ref 7.8–10.44)
CHLORIDE SERPL-SCNC: 102 MMOL/L (ref 98–107)
CO2 SERPL-SCNC: 27 MMOL/L (ref 23–31)
CREAT CL PREDICTED SERPL C-G-VRATE: 90 ML/MIN (ref 70–130)
GLUCOSE SERPL-MCNC: 121 MG/DL (ref 80–115)
HGB BLD-MCNC: 9.8 G/DL (ref 12–16)
MCH RBC QN AUTO: 27 PG (ref 27–31)
MCV RBC AUTO: 84.9 FL (ref 78–98)
PLATELET # BLD AUTO: 189 THOU/UL (ref 130–400)
POTASSIUM SERPL-SCNC: 3.7 MMOL/L (ref 3.5–5.1)
RBC # BLD AUTO: 3.62 MILL/UL (ref 4.2–5.4)
SODIUM SERPL-SCNC: 139 MMOL/L (ref 136–145)
WBC # BLD AUTO: 15.2 THOU/UL (ref 4.8–10.8)

## 2019-11-02 RX ADMIN — AMOXICILLIN AND CLAVULANATE POTASSIUM SCH MG: 875; 125 TABLET, FILM COATED ORAL at 20:17

## 2019-11-02 RX ADMIN — MULTIPLE VITAMINS W/ MINERALS TAB SCH TAB: TAB at 09:13

## 2019-11-02 RX ADMIN — AMOXICILLIN AND CLAVULANATE POTASSIUM SCH MG: 875; 125 TABLET, FILM COATED ORAL at 09:13

## 2019-11-02 RX ADMIN — ASPIRIN SCH MG: 81 TABLET ORAL at 09:13

## 2019-11-03 RX ADMIN — AMOXICILLIN AND CLAVULANATE POTASSIUM SCH MG: 875; 125 TABLET, FILM COATED ORAL at 08:41

## 2019-11-03 RX ADMIN — ASPIRIN SCH MG: 81 TABLET ORAL at 08:40

## 2019-11-03 RX ADMIN — AMOXICILLIN AND CLAVULANATE POTASSIUM SCH MG: 875; 125 TABLET, FILM COATED ORAL at 20:17

## 2019-11-03 RX ADMIN — MULTIPLE VITAMINS W/ MINERALS TAB SCH TAB: TAB at 08:41

## 2019-11-03 NOTE — PDOC.HOSPP
- Subjective


Encounter Date: 11/02/19


Encounter Time: 15:40


Subjective: 





wants to go home with  hospice.





- Objective


Vital Signs & Weight: 


 Vital Signs (12 hours)











  Temp Pulse Resp BP Pulse Ox


 


 11/03/19 07:42  98.2 F  78  16  135/75  98


 


 11/03/19 07:00  98.2 F  78   








 Weight











Admit Weight                   253 lb


 


Weight                         253 lb











 Most Recent Monitor Data











Heart Rate from ECG            61


 


NIBP                           111/57


 


NIBP BP-Mean                   75


 


Respiration from ECG           16


 


SpO2                           97














I&O: 


 











 11/02/19 11/03/19 11/04/19





 07:59 06:59 06:59


 


Intake Total   120


 


Output Total   


 


Balance   120











Result Diagrams: 


 11/02/19 08:14





 11/02/19 08:14


Additional Labs: 


 Accuchecks











  11/03/19 11/03/19 11/02/19





  12:04 05:03 20:19


 


POC Glucose  77  108  118 H














  11/02/19





  16:07


 


POC Glucose  111 H














Hospitalist ROS





- Medication


Medications: 


Active Medications











Generic Name Dose Route Start Last Admin





  Trade Name Freq  PRN Reason Stop Dose Admin


 


Acetaminophen  650 mg  10/26/19 23:11  11/03/19 11:07





  Tylenol  PO   650 mg





  Q4H PRN   Administration





  Pain   





     





     





     


 


Amoxicillin/Clavulanate Potassium  875 mg  11/01/19 09:00  11/03/19 08:41





  Augmentin  PO   875 mg





  Q12HR DEDE   Administration





     





     





     





     


 


Docusate Sodium  100 mg  10/26/19 23:11  10/31/19 12:15





  Colace  PO   100 mg





  DAILY PRN   Administration





  Constipation   





     





     





     


 


Escitalopram Oxalate  5 mg  10/27/19 09:00  11/03/19 08:41





  Lexapro  PO   5 mg





  DAILY DEDE   Administration





     





     





     





     


 


Hydrocortisone  5 mg  10/30/19 21:00  11/02/19 20:17





  Cortef  PO   5 mg





  QPM DEDE   Administration





     





     





     





     


 


Insulin Human Regular  0 units  10/26/19 23:45  10/31/19 06:14





  Humulin R  SC   2 unit





  .MILD SLIDING SCALE PRN   Administration





  Mild Correctional Scale   





     





     





     


 


Levothyroxine Sodium  100 mcg  10/27/19 06:00  11/03/19 05:25





  Synthroid  PO   100 mcg





  0600 DEDE   Administration





     





     





     





     


 


Ondansetron HCl  4 mg  10/26/19 23:11  11/02/19 16:19





  Zofran Odt  SL   4 mg





  Q6H PRN   Administration





  Nausea/Vomiting   





     





     





     














- Exam


General Appearance: NAD


Eye: anicteric sclera


ENT: normocephalic atraumatic


Neck: supple


Heart: RRR


Respiratory: CTAB


Gastrointestinal: soft


Extremities: no edema


Neurological: no weakness





Hosp A/P


(1) Palliative care encounter


Code(s): Z51.5 - ENCOUNTER FOR PALLIATIVE CARE   Status: Acute   





(2) Liver mass


Code(s): R16.0 - HEPATOMEGALY, NOT ELSEWHERE CLASSIFIED   Status: Acute   





(3) CKD (chronic kidney disease), stage IV


Code(s): N18.4 - CHRONIC KIDNEY DISEASE, STAGE 4 (SEVERE)   Status: Chronic   





(4) COPD (chronic obstructive pulmonary disease)


Status: Chronic   





(5) Diabetes mellitus


Code(s): E11.9 - TYPE 2 DIABETES MELLITUS WITHOUT COMPLICATIONS   Status: 

Chronic   





(6) HTN (hypertension)


Code(s): I10 - ESSENTIAL (PRIMARY) HYPERTENSION   Status: Chronic   





- Plan





talked with sister Allison


will make her comfort measures only.

## 2019-11-03 NOTE — PDOC.HOSPP
- Subjective


Encounter Date: 11/03/19


Encounter Time: 12:43


Subjective: 





c/o pain.


No N/V.





- Objective


Vital Signs & Weight: 


 Vital Signs (12 hours)











  Temp Pulse Resp BP Pulse Ox


 


 11/03/19 07:42  98.2 F  78  16  135/75  98


 


 11/03/19 07:00  98.2 F  78   








 Weight











Admit Weight                   253 lb


 


Weight                         253 lb











 Most Recent Monitor Data











Heart Rate from ECG            61


 


NIBP                           111/57


 


NIBP BP-Mean                   75


 


Respiration from ECG           16


 


SpO2                           97














I&O: 


 











 11/02/19 11/03/19 11/04/19





 07:59 06:59 06:59


 


Intake Total   120


 


Output Total   


 


Balance   120











Result Diagrams: 


 11/02/19 08:14





 11/02/19 08:14


Additional Labs: 


 Accuchecks











  11/03/19 11/03/19 11/02/19





  12:04 05:03 20:19


 


POC Glucose  77  108  118 H














  11/02/19





  16:07


 


POC Glucose  111 H














Hospitalist ROS





- Medication


Medications: 


Active Medications











Generic Name Dose Route Start Last Admin





  Trade Name Mattyq  PRN Reason Stop Dose Admin


 


Acetaminophen  650 mg  10/26/19 23:11  11/03/19 11:07





  Tylenol  PO   650 mg





  Q4H PRN   Administration





  Pain   





     





     





     


 


Amoxicillin/Clavulanate Potassium  875 mg  11/01/19 09:00  11/03/19 08:41





  Augmentin  PO   875 mg





  Q12HR DEDE   Administration





     





     





     





     


 


Docusate Sodium  100 mg  10/26/19 23:11  10/31/19 12:15





  Colace  PO   100 mg





  DAILY PRN   Administration





  Constipation   





     





     





     


 


Escitalopram Oxalate  5 mg  10/27/19 09:00  11/03/19 08:41





  Lexapro  PO   5 mg





  DAILY DEDE   Administration





     





     





     





     


 


Hydrocortisone  5 mg  10/30/19 21:00  11/02/19 20:17





  Cortef  PO   5 mg





  QPM DEDE   Administration





     





     





     





     


 


Insulin Human Regular  0 units  10/26/19 23:45  10/31/19 06:14





  Humulin R  SC   2 unit





  .MILD SLIDING SCALE PRN   Administration





  Mild Correctional Scale   





     





     





     


 


Levothyroxine Sodium  100 mcg  10/27/19 06:00  11/03/19 05:25





  Synthroid  PO   100 mcg





  0600 DEDE   Administration





     





     





     





     


 


Ondansetron HCl  4 mg  10/26/19 23:11  11/02/19 16:19





  Zofran Odt  SL   4 mg





  Q6H PRN   Administration





  Nausea/Vomiting   





     





     





     














- Exam


General Appearance: NAD


Eye: anicteric sclera


ENT: normocephalic atraumatic


Neck: supple


Heart: RRR


Respiratory: CTAB


Gastrointestinal: soft


Extremities: 1+ LE edema


Neurological: no weakness


Psychiatric: lethargic





Hosp A/P


(1) Palliative care encounter


Code(s): Z51.5 - ENCOUNTER FOR PALLIATIVE CARE   Status: Acute   





(2) Liver mass


Code(s): R16.0 - HEPATOMEGALY, NOT ELSEWHERE CLASSIFIED   Status: Acute   





(3) CKD (chronic kidney disease), stage IV


Code(s): N18.4 - CHRONIC KIDNEY DISEASE, STAGE 4 (SEVERE)   Status: Chronic   





(4) COPD (chronic obstructive pulmonary disease)


Status: Chronic   





(5) Diabetes mellitus


Code(s): E11.9 - TYPE 2 DIABETES MELLITUS WITHOUT COMPLICATIONS   Status: 

Chronic   





(6) HTN (hypertension)


Code(s): I10 - ESSENTIAL (PRIMARY) HYPERTENSION   Status: Chronic   





- Plan


old records reviewed/req





c/o pain.


will add Morphine/Zofran.


Not a candidate for chemo.


consult hospice per family request

## 2019-11-04 VITALS — TEMPERATURE: 98.1 F | SYSTOLIC BLOOD PRESSURE: 127 MMHG | DIASTOLIC BLOOD PRESSURE: 71 MMHG

## 2019-11-04 RX ADMIN — AMOXICILLIN AND CLAVULANATE POTASSIUM SCH MG: 875; 125 TABLET, FILM COATED ORAL at 09:13

## 2019-11-04 NOTE — DIS
DATE OF ADMISSION:  10/26/2019



DATE OF DISCHARGE:  11/04/2019



DISCHARGE DISPOSITION:  Back to [facility].



ALLERGIES:  NO KNOWN DRUG ALLERGIES.



DISCHARGE MEDICATIONS:  

1. Augmentin 875 mg b.i.d. for the next 5 days.

2. Dexamethasone taper.

3. All other home medications were left unchanged.



CODE STATUS:  The patient was changed to do not resuscitate this admission.



INPATIENT CONSULTANT:  

1. Neurology, Dr. Trevino.

2. Critical Care, Dr. Espana.

3. Palliative care team. 



The patient was seen and examined on the day of discharge.  Denies any new

complaints.  Poor appetite. 



BRIEF HOSPITAL COURSE:  The patient is a 69-year-old  female with hepatic

adenocarcinoma who was recently discharged from this facility, presented to the

hospital with altered mentation along with seizure-like activity.  Please refer to

the history and physical dated 26 October 2019 by Dr. Chen for further details. 



The patient was admitted to the to the hospital with a diagnosis of toxic metabolic

encephalopathy.  ABGs on admission showed pH of 7.22 with bicarbonate of 18.1, pCO2

of 44.9, and O2 saturation of 88% on room air.  She was found to have significant

acute kidney injury with creatinine of 3.11.  She showed good improvement with IV

hydration.  Blood culture showed Staphylococcus epidermidis and Staphylococcus

hemolyticus.  According to Dr. Espitia, the blood cultures are probably contaminated. 



Chest x-ray showed increased density at the left lung base.  CT scan of the brain

was negative for acute findings.  Echocardiogram showed left ventricular ejection

fraction probably in the in the normal range.  It was technically a difficult exam. 



The patient received sodium bicarbonate drip along with stress dose steroids.  She

was monitored in the intermediate care unit.  Later on, she was transferred to the

regular nursing floor.  She continues to have poor appetite.  Her blood pressure has

significantly improved.  She was also evaluated by Neurology for seizure-like

activity.  MRI was ordered, however, it was not done.  She has been cleared by

consultants for discharge. 



The patient was made do not resuscitate this admission.  They are looking forward to

start hospice once she is discharged from [facility].  Plan of care was discussed with

the patient and the family at the bedside.  They stated understanding. 



FINAL DIAGNOSES:  

1. Severe sepsis, suspected to be secondary to left lower lobe pneumonia

questionable aspiration. 

2. Acute hypoxic respiratory failure secondary to encephalopathy/sepsis.

3. Toxic metabolic encephalopathy.

4. Acute kidney injury on chronic kidney disease stage 3.

5. Hyponatremia.

6. Metabolic acidosis.

7. Chronic anemia.

8. Hyperkalemia on admission, resolved.

9. Relative adrenal insufficiency.

10. Hypotension on admission, resolved.

11. Myoclonic jerking, probably secondary to sepsis.

12. Diabetes mellitus type 2.

13. Hypothyroidism.

14. Depression without any suicidal ideation.

15. Chronic diastolic heart failure.

16. Dementia.



TIME SPENT WITH PATIENT:  Total time coordinating the discharge of this patient was

36 minute. 







Job ID:  [id number]

## 2019-11-25 ENCOUNTER — HOSPITAL ENCOUNTER (INPATIENT)
Dept: HOSPITAL 92 - ERS | Age: 69
LOS: 2 days | Discharge: NURSING, REHAB OR LONG-TERM CARE | DRG: 871 | End: 2019-11-27
Attending: INTERNAL MEDICINE | Admitting: INTERNAL MEDICINE
Payer: MEDICARE

## 2019-11-25 VITALS — BODY MASS INDEX: 45.5 KG/M2

## 2019-11-25 DIAGNOSIS — A41.9: Primary | ICD-10-CM

## 2019-11-25 DIAGNOSIS — J44.9: ICD-10-CM

## 2019-11-25 DIAGNOSIS — N17.9: ICD-10-CM

## 2019-11-25 DIAGNOSIS — C22.0: ICD-10-CM

## 2019-11-25 DIAGNOSIS — E66.01: ICD-10-CM

## 2019-11-25 DIAGNOSIS — R65.21: ICD-10-CM

## 2019-11-25 DIAGNOSIS — E27.40: ICD-10-CM

## 2019-11-25 DIAGNOSIS — I13.0: ICD-10-CM

## 2019-11-25 DIAGNOSIS — K59.00: ICD-10-CM

## 2019-11-25 DIAGNOSIS — E11.22: ICD-10-CM

## 2019-11-25 DIAGNOSIS — E30.9: ICD-10-CM

## 2019-11-25 DIAGNOSIS — I50.9: ICD-10-CM

## 2019-11-25 DIAGNOSIS — N39.0: ICD-10-CM

## 2019-11-25 DIAGNOSIS — Z66: ICD-10-CM

## 2019-11-25 DIAGNOSIS — N18.4: ICD-10-CM

## 2019-11-25 LAB
ALBUMIN SERPL BCG-MCNC: 3.1 G/DL (ref 3.4–4.8)
ALP SERPL-CCNC: 340 U/L (ref 40–110)
ALT SERPL W P-5'-P-CCNC: 25 U/L (ref 8–55)
ANION GAP SERPL CALC-SCNC: 16 MMOL/L (ref 10–20)
AST SERPL-CCNC: 40 U/L (ref 5–34)
BACTERIA UR QL AUTO: (no result) HPF
BASOPHILS # BLD AUTO: 0 THOU/UL (ref 0–0.2)
BASOPHILS NFR BLD AUTO: 0.2 % (ref 0–1)
BICARBONATE (HCO3V): 31 MMOL/L (ref 22–28)
BILIRUB SERPL-MCNC: 1.3 MG/DL (ref 0.2–1.2)
BUN SERPL-MCNC: 33 MG/DL (ref 9.8–20.1)
CA-I BLD-SCNC: 1.16 MMOL/L
CALCIUM SERPL-MCNC: 9.5 MG/DL (ref 7.8–10.44)
CHLORIDE SERPL-SCNC: 92 MMOL/L (ref 98–107)
CHLORIDE SERPL-SCNC: 96 MMOL/L (ref 98–107)
CK MB SERPL-MCNC: 1 NG/ML (ref 0–6.6)
CO2 BLDV CALC-SCNC: 32.6 MMOL/L (ref 22–28)
CO2 SERPL-SCNC: 30 MMOL/L (ref 23–31)
CO2 TENSION (PVCO2): 52.7 MMHG (ref 40–50)
CREAT CL PREDICTED SERPL C-G-VRATE: 0 ML/MIN (ref 70–130)
EOSINOPHIL # BLD AUTO: 0.3 THOU/UL (ref 0–0.7)
EOSINOPHIL NFR BLD AUTO: 2 % (ref 0–10)
GLOBULIN SER CALC-MCNC: 3.8 G/DL (ref 2.4–3.5)
GLUCOSE SERPL-MCNC: 116 MG/DL (ref 80–115)
HCT VFR BLD CALC: 32 % (ref 36–47)
HEMOGLOBIN - CALC: 10.8 G/DL (ref 12–16)
HGB BLD-MCNC: 9.5 G/DL (ref 12–16)
LYMPHOCYTES # BLD: 2.4 THOU/UL (ref 1.2–3.4)
LYMPHOCYTES NFR BLD AUTO: 15.4 % (ref 21–51)
MAGNESIUM SERPL-MCNC: 1.3 MG/DL (ref 1.6–2.6)
MCH RBC QN AUTO: 26.9 PG (ref 27–31)
MCV RBC AUTO: 86.2 FL (ref 78–98)
MONOCYTES # BLD AUTO: 0.6 THOU/UL (ref 0.11–0.59)
MONOCYTES NFR BLD AUTO: 3.7 % (ref 0–10)
NEUTROPHILS # BLD AUTO: 12.4 THOU/UL (ref 1.4–6.5)
NEUTROPHILS NFR BLD AUTO: 78.8 % (ref 42–75)
PLATELET # BLD AUTO: 174 THOU/UL (ref 130–400)
POTASSIUM SERPL-SCNC: 4.3 MMOL/L (ref 3.5–5.1)
POTASSIUM SERPL-SCNC: 4.6 MMOL/L (ref 3.5–5.1)
PROT UR STRIP.AUTO-MCNC: 30 MG/DL
RBC # BLD AUTO: 3.51 MILL/UL (ref 4.2–5.4)
RBC UR QL AUTO: (no result) HPF (ref 0–3)
SAO2 % BLDV FROM PO2: 97.7 % (ref 60–85)
SODIUM SERPL-SCNC: 133 MMOL/L (ref 136–145)
SODIUM SERPL-SCNC: 135 MMOL/L (ref 138–145)
WBC # BLD AUTO: 15.8 THOU/UL (ref 4.8–10.8)

## 2019-11-25 PROCEDURE — 36556 INSERT NON-TUNNEL CV CATH: CPT

## 2019-11-25 PROCEDURE — 94760 N-INVAS EAR/PLS OXIMETRY 1: CPT

## 2019-11-25 PROCEDURE — 84484 ASSAY OF TROPONIN QUANT: CPT

## 2019-11-25 PROCEDURE — 93005 ELECTROCARDIOGRAM TRACING: CPT

## 2019-11-25 PROCEDURE — 81003 URINALYSIS AUTO W/O SCOPE: CPT

## 2019-11-25 PROCEDURE — 82803 BLOOD GASES ANY COMBINATION: CPT

## 2019-11-25 PROCEDURE — 51702 INSERT TEMP BLADDER CATH: CPT

## 2019-11-25 PROCEDURE — 80400 ACTH STIMULATION PANEL: CPT

## 2019-11-25 PROCEDURE — 87077 CULTURE AEROBIC IDENTIFY: CPT

## 2019-11-25 PROCEDURE — 82330 ASSAY OF CALCIUM: CPT

## 2019-11-25 PROCEDURE — 85025 COMPLETE CBC W/AUTO DIFF WBC: CPT

## 2019-11-25 PROCEDURE — 96367 TX/PROPH/DG ADDL SEQ IV INF: CPT

## 2019-11-25 PROCEDURE — 87186 SC STD MICRODIL/AGAR DIL: CPT

## 2019-11-25 PROCEDURE — 82553 CREATINE MB FRACTION: CPT

## 2019-11-25 PROCEDURE — 96366 THER/PROPH/DIAG IV INF ADDON: CPT

## 2019-11-25 PROCEDURE — 96365 THER/PROPH/DIAG IV INF INIT: CPT

## 2019-11-25 PROCEDURE — 87040 BLOOD CULTURE FOR BACTERIA: CPT

## 2019-11-25 PROCEDURE — 87086 URINE CULTURE/COLONY COUNT: CPT

## 2019-11-25 PROCEDURE — 71045 X-RAY EXAM CHEST 1 VIEW: CPT

## 2019-11-25 PROCEDURE — 82533 TOTAL CORTISOL: CPT

## 2019-11-25 PROCEDURE — 87804 INFLUENZA ASSAY W/OPTIC: CPT

## 2019-11-25 PROCEDURE — 83735 ASSAY OF MAGNESIUM: CPT

## 2019-11-25 PROCEDURE — 83605 ASSAY OF LACTIC ACID: CPT

## 2019-11-25 PROCEDURE — 96375 TX/PRO/DX INJ NEW DRUG ADDON: CPT

## 2019-11-25 PROCEDURE — 80048 BASIC METABOLIC PNL TOTAL CA: CPT

## 2019-11-25 PROCEDURE — 36416 COLLJ CAPILLARY BLOOD SPEC: CPT

## 2019-11-25 PROCEDURE — 80053 COMPREHEN METABOLIC PANEL: CPT

## 2019-11-25 PROCEDURE — 81015 MICROSCOPIC EXAM OF URINE: CPT

## 2019-11-25 NOTE — RAD
RIGHT SHOULDER THREE VIEWS:

 

HISTORY:

Right shoulder pain.

 

FINDINGS:

There are degenerative changes in the acromioclavicular joint. No acute fracture, dislocation or bony
 destruction is identified.

 

POS: OFF

## 2019-11-25 NOTE — RAD
PORTABLE CHEST ONE VIEW:

 

11/25/2019

 

9:03       p.m.

 

HISTORY:

Fever. Hypotension. Weakness.

 

COMPARISON:

06/27/2019

 

FINDINGS:

The heart size is borderline. The aorta is tortuous. Opacity in the right paratracheal region is stab
le. No lobar consolidation, pneumothoraces, thea pulmonary edema or pleural effusions are seen.

 

IMPRESSION:

No acute process.

 

POS: OFF

## 2019-11-26 LAB
ANION GAP SERPL CALC-SCNC: 11 MMOL/L (ref 10–20)
BASOPHILS # BLD AUTO: 0 THOU/UL (ref 0–0.2)
BASOPHILS NFR BLD AUTO: 0 % (ref 0–1)
BUN SERPL-MCNC: 35 MG/DL (ref 9.8–20.1)
CALCIUM SERPL-MCNC: 8.5 MG/DL (ref 7.8–10.44)
CHLORIDE SERPL-SCNC: 98 MMOL/L (ref 98–107)
CO2 SERPL-SCNC: 29 MMOL/L (ref 23–31)
CREAT CL PREDICTED SERPL C-G-VRATE: 56 ML/MIN (ref 70–130)
EOSINOPHIL # BLD AUTO: 0.2 THOU/UL (ref 0–0.7)
EOSINOPHIL NFR BLD AUTO: 1.6 % (ref 0–10)
GLUCOSE SERPL-MCNC: 118 MG/DL (ref 80–115)
HGB BLD-MCNC: 8.1 G/DL (ref 12–16)
LYMPHOCYTES # BLD: 1 THOU/UL (ref 1.2–3.4)
LYMPHOCYTES NFR BLD AUTO: 7.1 % (ref 21–51)
MCH RBC QN AUTO: 27.1 PG (ref 27–31)
MCV RBC AUTO: 87 FL (ref 78–98)
MONOCYTES # BLD AUTO: 0.4 THOU/UL (ref 0.11–0.59)
MONOCYTES NFR BLD AUTO: 2.7 % (ref 0–10)
NEUTROPHILS # BLD AUTO: 12.6 THOU/UL (ref 1.4–6.5)
NEUTROPHILS NFR BLD AUTO: 88.6 % (ref 42–75)
PLATELET # BLD AUTO: 131 THOU/UL (ref 130–400)
POTASSIUM SERPL-SCNC: 4.3 MMOL/L (ref 3.5–5.1)
RBC # BLD AUTO: 2.97 MILL/UL (ref 4.2–5.4)
SODIUM SERPL-SCNC: 134 MMOL/L (ref 136–145)
TROPONIN I SERPL DL<=0.01 NG/ML-MCNC: 0.12 NG/ML (ref ?–0.03)
TROPONIN I SERPL DL<=0.01 NG/ML-MCNC: 0.15 NG/ML (ref ?–0.03)
WBC # BLD AUTO: 14.2 THOU/UL (ref 4.8–10.8)

## 2019-11-26 RX ADMIN — INSULIN LISPRO PRN UNIT: 100 INJECTION, SOLUTION INTRAVENOUS; SUBCUTANEOUS at 20:28

## 2019-11-26 RX ADMIN — HEPARIN SODIUM SCH UNITS: 5000 INJECTION, SOLUTION INTRAVENOUS; SUBCUTANEOUS at 15:56

## 2019-11-26 RX ADMIN — HYDROCORTISONE SODIUM SUCCINATE SCH MG: 100 INJECTION, POWDER, FOR SOLUTION INTRAMUSCULAR; INTRAVENOUS at 18:10

## 2019-11-26 RX ADMIN — HEPARIN SODIUM SCH UNITS: 5000 INJECTION, SOLUTION INTRAVENOUS; SUBCUTANEOUS at 20:28

## 2019-11-26 RX ADMIN — HEPARIN SODIUM SCH UNITS: 5000 INJECTION, SOLUTION INTRAVENOUS; SUBCUTANEOUS at 10:55

## 2019-11-26 RX ADMIN — INSULIN LISPRO PRN UNIT: 100 INJECTION, SOLUTION INTRAVENOUS; SUBCUTANEOUS at 16:26

## 2019-11-26 RX ADMIN — HYDROCORTISONE SODIUM SUCCINATE SCH MG: 100 INJECTION, POWDER, FOR SOLUTION INTRAMUSCULAR; INTRAVENOUS at 10:55

## 2019-11-26 RX ADMIN — HYDROCORTISONE SODIUM SUCCINATE SCH MG: 100 INJECTION, POWDER, FOR SOLUTION INTRAMUSCULAR; INTRAVENOUS at 02:51

## 2019-11-26 NOTE — PDOC.PULCN
Pulmonology Consult: HPI





- Date of Consult


Date: 11/26/19


Time: 11:00





- Consult Details


Reason for Consult: 





CCU admission


Requesting Physician: Mina





- History of Present Illness


HPI: 


HERNANDEZ,LAURA REYES is a 69 year-old F with PMH metastatic hepatic 

adenocarcinoma, COPD, CKD3, DM, hypothyroid was brought to ED from [facility] after several day history of generalized weakness, decreased 

appetite. Had reported fever of 103 at NH. Denies current pain, SOB, difficulty 

breathing. This is her 5th admission in the past 3 months. Daughter, GREER is 

present in room and notes patient had been doing well at nursing facility, 

playing [employer] [place]. Baseline is wheelchair bound, unable to perform transfers. She 

is DNR. She is admitted for severe sepsis 2/2 UTI, was started on IV abx. BP 

reportedly did not respond to fluid resuscitation so central line place and 

started on levaphed which was weaned off last night. Patient now feeling well, 

sitting up in bed, tolerating PO intake.











Pulmonology Consult: ROS





- Review of Systems


All systems: reviewed and no additional remarkable complaints except as stated


Constitutional: fever, weakness


Cardiovascular: edema.  negative: chest pain


Respiratory: no reported symptoms, short of breath





Pulmonology Consult: [facility]


Source: patient, family


Past Medical History: 





Liver adenocarcinoma, COPD, HTN, dementia, CKD3, HFpEF, DM, HLD, chronic L 

shoulder pain, hypothyroid, relative adrenal insufficiency





- Family History


Family history: reviewed and not pertinent





- Social History


Smoking Status: Never smoker


Alcohol Use: none


Drug Use History: none


Living Situation: nursing home resident





Pulmonology Consult: Meds





- Medications


MAR Reviewed: Yes


Medications: 


 Current Medications





Acetaminophen (Tylenol)  650 mg PO Q4H PRN


   PRN Reason: Headache/Fever/Mild Pain (1-3)


Acetaminophen (Tylenol)  650 mg WI Q4H PRN


   PRN Reason: Headache/Fever/Mild Pain (1-3)


Bisacodyl (Dulcolax)  10 mg PO DAILYPRN PRN


   PRN Reason: Constipation


Dextrose/Water (Dextrose 50%)  25 gm SLOW IVP PRN PRN


   PRN Reason: Hypoglycemia


Glucagon (Glucagon)  1 mg IM PRN PRN


   PRN Reason: Hypoglycemia


Heparin Sodium (Porcine) (Heparin)  5,000 units SC TID DEDE


Hydrocortisone Sodium Succinate (Solu-Cortef)  100 mg IVP 0200,1000,1800 DEDE


   Last Admin: 11/26/19 02:51 Dose:  100 mg


Cefepime HCl 2 gm/ Sodium (Chloride)  100 mls @ 200 mls/hr IVPB 1200,2359 DEDE


Norepinephrine Bitartrate (Levophed)  250 mls @ 0 mls/hr IVPB INF DEDE; Protocol


Dextrose/Water (D5w)  1,000 mls @ 0 mls/hr IV .Q0M PRN


   PRN Reason: Hypoglycemia


Vancomycin HCl 1.25 gm/ Sodium (Chloride)  250 mls @ 166.667 mls/hr IVPB 2300 

DEDE


Sodium Chloride (Normal Saline 0.9%)  1,000 mls @ 100 mls/hr IV .Q10H DEDE


   Last Admin: 11/26/19 04:17 Dose:  1,000 mls


Insulin Human Lispro (Humalog)  0 units SC .MILD SLIDING SCALE PRN


   PRN Reason: Mild Correctional Scale


Miscellaneous Medication (Pharmacy To Dose)  1 each IVPB ONE PRN


   PRN Reason: Pharmacy to dose


   Stop: 12/26/19 00:29











- Allergies


Allergies/Adverse Reactions: 


 Allergies











Allergy/AdvReac Type Severity Reaction Status Date / Time


 


TAPE Allergy  Rash Uncoded 11/26/19 02:06














Pulmonology Consult: PE





- Physical Exam


Constitutional: NAD


Deviation from normal: MM dry


Cardiovascular: RRR, no significant murmur


Respiratory: clear to auscultation anteriorly.  negative: wheezes


Gastrointestinal: soft, non-tender, positive bowel sounds


Deviation from normal: 2+ pitting edema BLE


Neurological: moves all 4 limbs


Psychiatric: A&O x 3


Skin: cap refill <2 seconds





Pulmonology Consult: Results





- Labs


Result Diagrams: 


 11/26/19 04:20





 11/26/19 04:20





- ABG Interpretation


ABG Results: 


 











POC Bicarbonate Calc  31.0 mmol/L (22.0-28.0)  H*  11/25/19  21:15    














Pulmonology Consult: A/P





- Time


Time: 


50% of the time was spent in coordination of care (as documented) at patient's 

floor/unit and/or counseling patient.





Time with Patient: greater than 50 minutes





- Plan


Plan: 





Severe Sepsis 2/2 UTI


- leukocytosis and reported fever of 103 in NH. 100 rectal temp in ED. No 

tachypnea or tachycardia


- Ucx, Bcx pending


- started on Vanc and cefepime, pending cultures will narrow coverage. Patient 

clinically improving.





Adrenal insufficiency


- diagnosed with reactive insufficiency in prior admission. Dexamethasone 

tapered off as home med 3 days ago


- Cortisol 2.7 this am and now on hydrocortisone IV. B/l adrenal gland mass on 

prior CT could be contributing as well


- appears patient has not had ACTH stim test. Test ordered for tomorrow am





Hepatic adenocarcinoma


- not candidate for chemo, patient now DNR. Dr. Alarcon is oncologist.


- was supposed to be transitioned to hospice care though this was not done





HFpEF


- hx of CHF in chart. Last 3 echos completed in past 5 months are difficult 

studies but show EF 50-55%, 1/3 diastolic dysfunction





Hypothyroid


- continue home synthroid





Dementia


- on namenda





CKD4


- appears to be at baseline





Hx HTN, COPD


- no home medications





IVF discontinued, patient tolerating PO intake well. Home medications will need 

to be restarted pending med rec. ACTH stim test ordered for tomorrow am to 

evaluate adrenal function to help determine if pt needs to be on long term 

steroids. Hydrocortisone stopped after 1800 dose tonight in preparation.  Order 

placed for transfer out of unit. Recommend the continued conversation of 

transition to hospice care upon discharge.

## 2019-11-26 NOTE — RAD
CHEST 1 VIEW:

 

INDICATION: 

Tube and chest line.

 

COMPARISON: 

Prior exam dated 11/25/2019 at 11:59 p.m.

 

FINDINGS: 

Right subclavian central venous catheter has been withdrawn with the tip still remaining within the r
ight atrium.  Cardiomegaly persists.  Lungs are clear.  No pneumothorax is demonstrated.

 

IMPRESSION: 

Right subclavian central venous catheter as above.

 

POS: BH

## 2019-11-26 NOTE — RAD
CHEST 2 VIEW:

 

INDICATION: 

Status post intubation.

 

COMPARISON: 

Prior exam dated 11/25/2019.

 

FINDINGS: 

There is a right subclavian central venous catheter that has been replaced.  The tip of the catheter 
is seen within the region of the right atrium.  Cardiomegaly persists.  Prominence of the upper media
stinum is similar appearing.  Lungs are clear.  No pleural effusion or pneumothorax is evident.

 

IMPRESSION: 

1.  Stable cardiomegaly.

 

2.  New right subclavian central venous catheter.

 

POS: BH

## 2019-11-26 NOTE — HP
PRIMARY CARE PROVIDER:  Taiwo Burns MD



CHIEF COMPLAINT:  Generalized weakness.



HISTORY OF PRESENT ILLNESS:  Ms. Gomez is a pleasant 69-year-old lady, who was

seen at [facility] on November 25, 2019.  She was

hospitalized at this facility from October 26, 2019, to November 4, 2019.  At that

time, her admission was for severe sepsis, suspected to be secondary to left lower

lobe pneumonia, questionable aspiration and toxic metabolic encephalopathy. 



She is able to provide some history.  Collateral history was obtained from

discussion with her sister, who is also her medical power of  over the

telephone. 



The patient was reportedly doing well at [facility].  Her

sister reports that she has had closure of her left eyelid for several months now.

The patient had some pain in the right shoulder over the last few days.  Over the

last week, she has been complaining of generalized weakness.  Her appetite has also

been poor.  The patient denies any dysuria.  She denies any chest pain.  She reports

generalized weakness, but is unable to characterize it further.  She denies any

cough.  She denies any abdominal pain. 



REVIEW OF SYSTEMS:  All systems were reviewed and found to be negative except for

the pertinent positives mentioned above. 



PAST MEDICAL HISTORY:  Hepatocellular carcinoma, chronic left shoulder pain, chronic

left elbow pain, diabetes mellitus, dyslipidemia, hypertension, COPD, dementia,

congestive heart failure, and chronic kidney disease stage 3. 



PAST SURGICAL HISTORY:  Tubal ligation and bilateral knee surgery.



PSYCHIATRIC HISTORY:  Depression.



SOCIAL HISTORY:  No history of tobacco use, alcohol use, or recreational drug use.



FAMILY HISTORY:  Significant for hypertension.



CODE STATUS:  I discussed her code status with her sister.  The patient is DNAR.



ALLERGIES:  NO KNOWN DRUG ALLERGIES.



CURRENT MEDICATIONS:  

1. Atorvastatin 40 mg daily.

2. Calcitriol 0.5 mcg daily.

3. Gabapentin 300 mg 2 times a day.

4. Amitriptyline 25 mg daily.

5. Levothyroxine 100 mcg daily.

6. Tramadol 50 mg at bedtime.

7. Namenda XR 14 mg daily times.

8. Tylenol No. 3 p.r.n.

9. Aspirin 81 mg daily.



PHYSICAL EXAMINATION:

GENERAL:  On examination, Ms. Gomez is sleepy, but arousable, not in acute

distress.  She is obese. 

VITAL SIGNS:  Blood pressure 94/72, pulse 72, respiratory rate 18, and oxygen

saturation 100% on 2 L of oxygen.  Rectal temperature was 100 degrees Fahrenheit at

the time of presentation.  Her sister reports that her temperature at [facility] was 103

degrees Fahrenheit before she was transferred to the emergency room. 

EYES:  No scleral icterus, no conjunctival pallor.  Left eyelid covering the left

eye all the time, the patient has no light perception in the left eye. 

ENT:  Dry mucosal membranes.  No oropharyngeal erythema or exudates. 

NECK:  Supple, nontender, trachea is midline. 

RESPIRATORY:  Accessory muscles of breathing are not active.  Chest wall movements

are symmetric bilaterally.  Lungs are clear to auscultation without wheeze, rhonchi

or crepitations. 

CARDIOVASCULAR:  S1 and S2 are heard, regular.  Peripheral pulses palpable. 

NEUROLOGIC:  Left eye blindness.  Otherwise, cranial nerves 2 through 12 are intact. 

MUSCULOSKELETAL:  Diminished range of movement at the right shoulder secondary to

pain. 

SKIN:  No rashes. 

LYMPHATIC:  No cervical lymphadenopathy. 

PSYCHIATRIC:  Normal mood, normal affect, the patient is oriented to person, place

and time. 



LABS AND INVESTIGATIONS:  Ms. Gomez's labs and investigations were reviewed.  I

reviewed her electrocardiogram, which shows normal sinus rhythm, no ST changes to

suggest an acute coronary syndrome.  I also reviewed her chest x-ray, which does not

show any pulmonary infiltrates.  She also had x-rays of the right shoulder, which

showed degenerative changes in the acromioclavicular joint, but no acute fracture,

dislocation or bony destruction.  She has leukocytosis with 15,800 white cells, of

which 78% are neutrophils, normocytic anemia with hemoglobin of 9.5, normal platelet

count, decreased sodium of 133, normal potassium, elevated blood urea nitrogen of

33, elevated creatinine of 1.86, normal lactic acid, decreased magnesium of 1.3,

elevated total bilirubin of 1.3, elevated AST of 40, normal ALT, elevated alkaline

phosphatase of 340, alkaline phosphatase was 222 on October 29, 2019, indeterminate

troponin-I of 0.177, trending down to 0.125, and urinalysis that is positive for

ketones, blood, bilirubin, leukocyte esterase, urinary transitional epithelial cell,

urinary renal epithelial cells, amorphous crystals, and urine bacteria. 



ASSESSMENT AND PLAN:  Ms. Gomez is a pleasant 69-year-old lady, who was seen at

[facility] on November 25, 2019.  Her problem list includes: 

1. Sepsis:  Ms. Gomez is presenting with sepsis, most likely secondary to

urinary tract infection.  She will be admitted to the hospital for further

management.  She is currently in septic shock, not responding to intravenous fluids.

 Arrangements are being made for a central line.  She will be treated in the

Critical Care Unit. 

2. Urinary tract infection:  The patient has received vancomycin and cefepime in the

emergency room, which I will continue.  We will await urine and blood cultures. 

3. Diabetes mellitus type 2:  We will start the patient on Accu-Cheks and insulin

sliding scale. 

4. Acute on chronic stage 3 renal failure:  The patient is currently in stage 4

renal failure.  We will provide hydration and recheck her GFR. 

5. Hypertension:  The patient is currently in septic shock.  We will hold

antihypertensives at this time. 

6. Chronic obstructive pulmonary disease:  This appears to be stable.

7. Hepatocellular cancer:  The patient is following oncology clinic.  We will

consult Oncology Service for ongoing treatment options. 

Many thanks for allowing me to participate in your patient's care.  Please feel free

to contact me with any questions or concerns. 



LEVEL OF RISK:  High.



LEVEL OF COMPLEXITY:  High.







Job ID:  [id number]

## 2019-11-26 NOTE — PDOC.HOSPP
- Subjective


Encounter Date: 11/26/19


Encounter Time: 17:32


Subjective: 


70 y/o female with hepatocellular ca, DM, Obesity, HTN, COPD, CHF and CKD, 

recently discharged to SNF following treatment for sepsis due to pneumonia, now 

brought in from SNF due to acute onset of malaise and generalized weakness. 

Fouind to be hypotensive which persisted after IVF resuscitation and was 

started on Levophed. Hemodynamics and general condition has improved and she is 

off levophed.





- Objective


Vital Signs & Weight: 


 Vital Signs (12 hours)











  Temp Pulse Ox


 


 11/26/19 16:00  98.8 F 


 


 11/26/19 12:00  98.1 F 


 


 11/26/19 08:00  98.4 F  100








 Weight











Weight                         257 lb 0.944 oz











 Most Recent Monitor Data











Heart Rate from ECG            76


 


NIBP                           130/77


 


NIBP BP-Mean                   94


 


Respiration from ECG           16


 


SpO2                           100














I&O: 


 











 11/25/19 11/26/19 11/27/19





 06:59 06:59 06:59


 


Intake Total  272.6 720


 


Output Total  180 715


 


Balance  92.6 5











Result Diagrams: 


 11/26/19 04:20





 11/26/19 04:20


Additional Labs: 


 Accuchecks











  11/26/19 11/26/19





  16:21 06:16


 


POC Glucose  178 H  119 H














Hospitalist ROS





- Medication


Medications: 


Active Medications











Generic Name Dose Route Start Last Admin





  Trade Name Freq  PRN Reason Stop Dose Admin


 


Heparin Sodium (Porcine)  5,000 units  11/26/19 09:00  11/26/19 15:56





  Heparin  SC   5,000 units





  TID DEDE   Administration





     





     





     





     


 


Hydrocortisone Sodium Succinate  100 mg  11/26/19 02:00  11/26/19 10:55





  Solu-Cortef  IVP  11/26/19 23:00  100 mg





  0200,1000,1800 DEDE   Administration





     





     





     





     


 


Cefepime HCl 2 gm/ Sodium  100 mls @ 200 mls/hr  11/26/19 12:00  11/26/19 12:50





  Chloride  IVPB   100 mls





  1200,2359 DEDE   Administration





     





     





     





     


 


Insulin Human Lispro  0 units  11/26/19 01:14  11/26/19 16:26





  Humalog  SC   2 unit





  .MILD SLIDING SCALE PRN   Administration





  Mild Correctional Scale   





     





     





     














- Exam


General Appearance: awake alert


ENT: normocephalic atraumatic


Neck: supple, symmetric


Heart: RRR


Respiratory: no wheezes, no ronchi, normal chest expansion


Respiratory - other findings: fair air entry with transmitted sound


Gastrointestinal: soft, normal bowel sounds


Gastrointestinal - other findings: morbidly obese


Extremities - other findings: moderate bilateral leg edema


Neurological: cranial nerve grossly intact


Psychiatric: A&O x 3





Hosp A/P


(1) Septic shock


Code(s): A41.9 - SEPSIS, UNSPECIFIED ORGANISM; R65.21 - SEVERE SEPSIS WITH 

SEPTIC SHOCK   Status: Acute   





(2) UTI (urinary tract infection)


Status: Acute   





(3) Secondary adrenal insufficiency


Code(s): E27.49 - OTHER ADRENOCORTICAL INSUFFICIENCY   Status: Acute   





(4) AARON (acute kidney injury)


Code(s): N17.9 - ACUTE KIDNEY FAILURE, UNSPECIFIED   Status: Acute   





(5) Adenocarcinoma of liver


Code(s): C22.9 - MALIG NEOPLASM OF LIVER, NOT SPECIFIED AS PRIMARY OR SEC   

Status: Acute   





(6) CKD (chronic kidney disease) stage 3, GFR 30-59 ml/min


Code(s): N18.3 - CHRONIC KIDNEY DISEASE, STAGE 3 (MODERATE)   Status: Acute   





(7) Morbid obesity with BMI of 40.0-44.9, adult


Code(s): E66.01 - MORBID (SEVERE) OBESITY DUE TO EXCESS CALORIES; Z68.41 - BODY 

MASS INDEX (BMI) 40.0-44.9, ADULT   Status: Acute   





(8) COPD (chronic obstructive pulmonary disease)


Status: Chronic   





(9) Diabetes mellitus


Code(s): E11.9 - TYPE 2 DIABETES MELLITUS WITHOUT COMPLICATIONS   Status: 

Chronic   





- Plan


Continue current treatments

## 2019-11-27 VITALS — TEMPERATURE: 98.4 F | SYSTOLIC BLOOD PRESSURE: 127 MMHG | DIASTOLIC BLOOD PRESSURE: 68 MMHG

## 2019-11-27 LAB
ANION GAP SERPL CALC-SCNC: 11 MMOL/L (ref 10–20)
BASOPHILS # BLD AUTO: 0 THOU/UL (ref 0–0.2)
BASOPHILS NFR BLD AUTO: 0.2 % (ref 0–1)
BUN SERPL-MCNC: 32 MG/DL (ref 9.8–20.1)
CALCIUM SERPL-MCNC: 8.2 MG/DL (ref 7.8–10.44)
CHLORIDE SERPL-SCNC: 99 MMOL/L (ref 98–107)
CO2 SERPL-SCNC: 28 MMOL/L (ref 23–31)
CREAT CL PREDICTED SERPL C-G-VRATE: 64 ML/MIN (ref 70–130)
EOSINOPHIL # BLD AUTO: 0 THOU/UL (ref 0–0.7)
EOSINOPHIL NFR BLD AUTO: 0.1 % (ref 0–10)
GLUCOSE SERPL-MCNC: 189 MG/DL (ref 80–115)
HGB BLD-MCNC: 8.6 G/DL (ref 12–16)
LYMPHOCYTES # BLD: 0.9 THOU/UL (ref 1.2–3.4)
LYMPHOCYTES NFR BLD AUTO: 5.6 % (ref 21–51)
MCH RBC QN AUTO: 27.1 PG (ref 27–31)
MCV RBC AUTO: 86 FL (ref 78–98)
MONOCYTES # BLD AUTO: 0.4 THOU/UL (ref 0.11–0.59)
MONOCYTES NFR BLD AUTO: 2.1 % (ref 0–10)
NEUTROPHILS # BLD AUTO: 15.3 THOU/UL (ref 1.4–6.5)
NEUTROPHILS NFR BLD AUTO: 92.1 % (ref 42–75)
PLATELET # BLD AUTO: 149 THOU/UL (ref 130–400)
POTASSIUM SERPL-SCNC: 3.8 MMOL/L (ref 3.5–5.1)
RBC # BLD AUTO: 3.17 MILL/UL (ref 4.2–5.4)
SODIUM SERPL-SCNC: 134 MMOL/L (ref 136–145)
WBC # BLD AUTO: 16.6 THOU/UL (ref 4.8–10.8)

## 2019-11-27 RX ADMIN — INSULIN LISPRO PRN UNIT: 100 INJECTION, SOLUTION INTRAVENOUS; SUBCUTANEOUS at 06:10

## 2019-11-27 RX ADMIN — INSULIN LISPRO PRN UNIT: 100 INJECTION, SOLUTION INTRAVENOUS; SUBCUTANEOUS at 12:01

## 2019-11-27 RX ADMIN — HEPARIN SODIUM SCH UNITS: 5000 INJECTION, SOLUTION INTRAVENOUS; SUBCUTANEOUS at 08:19

## 2019-11-27 NOTE — PQF
ASIF JUAREZ OBI AJ SHARAD

[id number]                                                             [id number]

[id number]                             

                                   

CLINICAL DOCUMENTATION IMPROVEMENT CLARIFICATION FORM:  ICD-10 Updated



PLEASE DO AN ADDENDUM TO THE PROGRESS NOTE WITH ANY DOCUMENTATION UPDATES OR 
ADDITIONS AND CARRY THROUGH TO DC SUMMARY.   THANK YOU.



DATE:                       11/27/2019                   ATTN:DR. SHARAD DE LEON 



Please exercise your independent, professional judgment in responding to the 
clarification form. Clinical indicators are provided on the bottom of this form 
for your review.



Please check appropriate box(s):



                                      CONGESTIVE HEART FAILURE:



A. ACUITY

     [  ] Acute          [  ] Acute on Chronic          [  ] Chronic



B.  TYPE

     [  ] Systolic / HFrEF      [  ] Diastolic / HFpEF       [  ] Combined 
Systolic / Diastolic

     [  ] Hypertensive Heart and Kidney disease

     [  ] Hypertensive Heart Disease

     [  ] Hypertensive Kidney Disease



[  ] Other diagnosis ___________

[  ] Unable to determine



In addition, please specify:

Present on Admission (POA):  [  ] Yes             [  ] No             [  ] 
Unable to determine



For continuity of documentation, please document condition throughout progress 
notes and discharge summary.  Thank You.



CLINICAL INDICATORS - SIGNS / SYMPTOMS / LABS  / RESULTS AND LOCATION IN EMR





11/26 H&P ([facility]) PMH: CONGESTIVE HEART FAILURE



11/26 PULM CONSULT ([facility])   HFpEF- HX OF CHF IN CHART. LAST 3 ECHOS 
COMPLETED IN PAST 5 MONTHS ARE DIFFICULT STUDIES BUT SHOW EF 50-55%, 1/3 
DIASTOLIC DYSFUNCTION



11/27 PN (OBI) HX OF HTN, COPD, CHF, AND CKD; A/P: 11). ACUTE BILATERAL EDEMA 
TO LOWER EXTREMITY, START LOW DOSE LASIX. WILL ADD SPIRONOLACTONE AFTER 
COSYNTROPIN TEST.



RISK: 

DX CHRONIC KIDNEY DISEASE STAGE 3,  HTN , SEVERE SEPSIS WITH SEPTIC SHOCK ( [facility]/
PN ) 11/27



TREATMENTS:

LASIX STARTED( 11/27)

SUPPLEMENTAL OXYGEN (11/25-PRESENT)



THANK YOU! LANA





(This form is maintained as a part of the permanent medical record)

 2015 [employer].  All Rights Reserved

[email or web address]    [phone]

                                                              

MTDD

## 2019-11-27 NOTE — PRG
DATE OF SERVICE:  11/27/2019



SERVICE:  Pulmonary Medicine.



INTERVAL HISTORY:  The patient is doing fine from respiratory standpoint.  Breathing

comfortably.  She has been weaned down to room air.  She has no complaints of

shortness of breath or chest discomfort.  She is awake and alert.  She is very

oriented.  She has ptosis of the left eye and has a hard time moving it about.  She

has weakness in the right upper extremity.  Otherwise, there has been no interval

change to her condition. 



PHYSICAL EXAMINATION:

VITAL SIGNS:  Afebrile, pulse 73, blood pressure 170/85, respirations 20, and

saturation 95% on 2 L nasal cannula. 

GENERAL:  The patient is awake and alert, in no apparent distress. 

LUNGS:  Good air entry bilaterally.  No prolonged expiratory phase or wheezing is

appreciated. 

HEART:  Normal rate and regular. 

ABDOMEN:  Soft, nontender, and nondistended.  Bowel sounds are positive. 

MUSCULOSKELETAL:  No cyanosis or clubbing.  There is no pitting in the bilateral

lower extremities. 

:  Villanueva catheter in place.



LABORATORY DATA:  WBC 16.6, hemoglobin 8.6 and up trending, __________ 149,000, also

improving.  Creatinine 1.58 and downtrending.  Basic metabolic profile is otherwise

unremarkable.  The cosyntropin stimulation test was not performed as the baseline

value was 36.8.  That being said, this was on the heels of her being on high doses

of stress doses of corticosteroid.  Urinalysis is significant for pyuria.

Citrobacter is growing in the urine, which is a pansensitive organism.  Blood

cultures x2 and influenza are otherwise unremarkable. 



ASSESSMENT:  

1. Severe sepsis, secondary to urinary tract infection.

2. Urinary tract infection.

3. Adrenal insufficiency versus relative adrenal insufficiency.

4. Adenocarcinoma of the liver.

5. Dementia, fairly advanced.



DISCUSSION AND PLAN:  The patient is doing fine from respiratory standpoint and from

a hemodynamic standpoint.  At this point, she has no further requirements for

inpatient Pulmonary or Critical Care opinion.  Prior to leaving the hospital, I

would re-attempt the cosyntropin stimulation test when she has had an adequate time

for the hydrocortisone to be out of her system.  This can be considered on Friday

morning.  Either way, she has no further requirements for inpatient Pulmonary or

Critical Care opinion, and I will sign off.  Please call with additional questions

or concerns through time. 







Job ID:  [id number]

## 2019-11-27 NOTE — PDOC.HOSPP
- Subjective


Encounter Date: 11/27/19


Encounter Time: 07:26


Subjective: 


68 y/o female with hepatocellular ca, DM, Obesity, HTN, COPD, CHF and CKD, 

recently discharged to SNF following treatment for sepsis due to pneumonia, now 

brought in from SNF due to acute onset of malaise and generalized weakness. 

Fouind to be hypotensive which persisted after IVF resuscitation and was 

started on Levophed. Hemodynamically improved and levophed was weaned off. 

Feeling better. No BM is several days.





- Objective


Vital Signs & Weight: 


 Vital Signs (12 hours)











  Temp Pulse Resp BP Pulse Ox


 


 11/27/19 04:34  97.5 F L  68  14  154/78 H  96


 


 11/27/19 00:25  98.2 F  66  14  147/75 H  94 L


 


 11/26/19 21:16      95


 


 11/26/19 21:00      93 L


 


 11/26/19 20:58  98.2 F  71  12  148/69 H  93 L


 


 11/26/19 20:00      96








 Weight











Weight                         257 lb 0.944 oz











 Most Recent Monitor Data











Heart Rate from ECG            73


 


NIBP                           137/67


 


NIBP BP-Mean                   90


 


Respiration from ECG           14


 


SpO2                           96














I&O: 


 











 11/26/19 11/27/19 11/28/19





 06:59 06:59 06:59


 


Intake Total 272.6 1911 


 


Output Total 180 1210 


 


Balance 92.6 701 











Result Diagrams: 


 11/27/19 06:00





 11/27/19 06:00


Additional Labs: 


 Accuchecks











  11/27/19 11/26/19 11/26/19





  05:58 20:27 16:21


 


POC Glucose  182 H  197 H  178 H














Hospitalist ROS





- Medication


Medications: 


Active Medications











Generic Name Dose Route Start Last Admin





  Trade Name Freq  PRN Reason Stop Dose Admin


 


Heparin Sodium (Porcine)  5,000 units  11/26/19 09:00  11/26/19 20:28





  Heparin  SC   5,000 units





  TID DEDE   Administration





     





     





     





     


 


Insulin Human Lispro  0 units  11/26/19 01:14  11/27/19 06:10





  Humalog  SC   2 unit





  .MILD SLIDING SCALE PRN   Administration





  Mild Correctional Scale   





     





     





     














- Exam


General Appearance: awake alert


General - other findings: morbidly obese


ENT: normocephalic atraumatic


ENT - other findings: Ptosis of left eye


Neck: supple, symmetric


Heart: RRR, murmur present


Respiratory - other findings: air air entry bilaterally with few bibasal 

crackles and rhonchi


Gastrointestinal: soft, non-tender, non-distended, normal bowel sounds


Gastrointestinal - other findings: obese


Extremities - other findings: moderate edema of both feet


Neurological: cranial nerve grossly intact, no focal deficits


Psychiatric: A&O x 3





Hosp A/P


(1) Septic shock


Code(s): A41.9 - SEPSIS, UNSPECIFIED ORGANISM; R65.21 - SEVERE SEPSIS WITH 

SEPTIC SHOCK   Status: Acute   





(2) UTI (urinary tract infection)


Status: Acute   





(3) Secondary adrenal insufficiency


Code(s): E27.49 - OTHER ADRENOCORTICAL INSUFFICIENCY   Status: Acute   





(4) AARON (acute kidney injury)


Code(s): N17.9 - ACUTE KIDNEY FAILURE, UNSPECIFIED   Status: Acute   





(5) Adenocarcinoma of liver


Code(s): C22.9 - MALIG NEOPLASM OF LIVER, NOT SPECIFIED AS PRIMARY OR SEC   

Status: Acute   





(6) CKD (chronic kidney disease) stage 3, GFR 30-59 ml/min


Code(s): N18.3 - CHRONIC KIDNEY DISEASE, STAGE 3 (MODERATE)   Status: Acute   





(7) Morbid obesity with BMI of 40.0-44.9, adult


Code(s): E66.01 - MORBID (SEVERE) OBESITY DUE TO EXCESS CALORIES; Z68.41 - BODY 

MASS INDEX (BMI) 40.0-44.9, ADULT   Status: Acute   





(8) COPD (chronic obstructive pulmonary disease)


Status: Chronic   





(9) Diabetes mellitus


Code(s): E11.9 - TYPE 2 DIABETES MELLITUS WITHOUT COMPLICATIONS   Status: 

Chronic   





(10) Constipation


Code(s): K59.00 - CONSTIPATION, UNSPECIFIED   Status: Acute   





(11) Bilateral edema of lower extremity


Code(s): R60.0 - LOCALIZED EDEMA   Status: Acute   





- Plan


DC IV vanc and Cefepime and start oral levaquin in line with microbe 

susceptibility.


Start low dose lasix. Will add spironolactone after cosyntropin test.


Restart levothryroxine, ASA, PPI and other home medication.


PT /OT eval and treat.


Possible discharge in a day contemplated.


Follow CBC and renal function.

## 2019-11-28 NOTE — DIS
DATE OF ADMISSION:  11/25/2019



DATE OF DISCHARGE:  11/27/2019



PRIMARY CARE PHYSICIAN:  Taiwo Burns MD



DISCHARGE DIAGNOSES:  

1. Septic shock.

2. Citrobacter and Pseudomonas urinary tract infection.

3. Secondary adrenal insufficiency.

4. Acute kidney injury.

5. Diabetes mellitus.

6. Bilateral lower extremity edema.

7. Constipation.

8. Diabetes mellitus.

9. Chronic obstructive pulmonary disease without acute exacerbation.

10. Morbid obesity with body mass index of 44.9.

11. Adenocarcinoma of the liver.

12. Chronic kidney disease stage 3.

13. Dementia.

14. Hypertension.

15. Chronic diastolic heart failure.

16. Mild mitral regurgitation.



CONSULTS:  Pulmonary and Critical Care.



HOSPITAL COURSE:  A 69-year-old female with hepatocellular cancer, diabetes,

obesity, hypertension, COPD, CHF, and CKD, recently discharged from the hospital to

skilled nursing facility following treatment for sepsis due to pneumonia, now

readmitted from a skilled nursing facility due to acute onset of malaise and

generalized weakness.  The patient on presentation to the ER was found to be

hypotensive, which persisted after IV fluid resuscitation and was subsequently

started on Levophed.  The patient also was started on broad-spectrum antibiotic

therapy and further evaluation revealed urinary tract infection as the source of

sepsis.  The patient improved promptly and Levophed was weaned off.  Urine culture

grew Citrobacter koseri and Pseudomonas.  Antibiotics were tuned down to oral

levofloxacin in line with susceptibility test.  The patient remained stable and was

subsequently discharged back to the nursing home to continue restorative therapy. 



PHYSICAL EXAMINATION:

VITAL SIGNS:  Temperature 98.4, pulse 71, respiratory rate 18, SpO2 of 96% on 2 L

nasal cannula, blood pressure 127/68. 

GENERAL:  Morbidly obese female, in no distress.  Oral mucosa is moist. 

HEENT:  Ptosis of left eye noted.  Otherwise normocephalic, atraumatic. 

CARDIOVASCULAR:  Regular rhythm and rate with normal heart sounds 1 and 2. 

RESPIRATORY:  Fair air entry bilaterally with few bibasilar crackles and rhonchi. 

GI:  Morbidly obese, soft, nontender, nondistended with normal bowel sounds. 

EXTREMITIES:  Mild-to-moderate 

bilateral edema obviously on the distal leg noted. 

CNS:  Conscious, alert, and oriented x3 with appropriate mental status.



DISCHARGE DISPOSITION:  Skilled nursing facility.



DISCHARGE CONDITION:  Improved.



DISCHARGE MEDICATIONS:  

1. Acetaminophen with Codeine 300/60 mg q.6 p.r.n.

2. Tramadol 50 mg q.6 p.r.n.

3. Acetaminophen 650 mg q.4 p.r.n.

4. Elavil 25 mg p.o. daily at bedtime.

5. Aspirin 81 mg p.o. daily.

6. Lipitor 40 mg p.o. daily.

7. Docusate 100 mg p.o. b.i.d.

8. Folic acid with multivitamin and iron one tablet p.o. daily.

9. Sliding scale insulin.

10. Zofran 4 mg q.6 p.r.n.

11. Protonix 40 mg p.o. daily.

12. Ventolin HFA 2 puffs inhalation q.6 p.r.n. for shortness of breath.

13. Calcitriol 0.5 mcg p.o. daily.

14. Furosemide 20 mg p.o. daily.

15. Gabapentin 300 mg p.o. b.i.d.

16. Lactulose 10 g p.o. daily.

17. Levofloxacin 750 mg every other day for 5 doses.

18. Levothyroxine 100 mcg p.o. daily.

19. Namenda 5 mg p.o. b.i.d.

20. Spironolactone 50 mg p.o. daily. 



This discharge took more than 40 minutes.







Job ID:  [id number]

## 2024-04-11 NOTE — CON
DATE OF CONSULTATION:  10/29/2019



Consultation via telemedicine.



CHIEF COMPLAINT:  Seizure-like episode.



HISTORY OF PRESENT ILLNESS:  The patient never had seizures per her sister.  
She was

last independent about 2 years ago and she has been living in an assisted 
living for

10 years.  Sister thinks she might have some dementia at home.  She did pass 
out in

August 2019 and she has had episodes of loss of consciousness on and off.  The

patient has a mass on her liver and they are not sure if she can have 
chemotherapy,

at this time that is being investigated.  The patient's sister says she was 
confused

as well and yesterday she was on the floor and they noticed she had significant 
drop

in blood pressure and the team noticed bilateral upper and lower extremity 
tremor.

Therefore, this consultation was requested.  The patient's blood pressures went 
all

the way down to 60s and she is currently being maintained on vasopressors and 
the

question is whether she has any adrenal insufficiency or cirrhosis of her liver 
that

is new in onset. 



PREVIOUS MEDICAL HISTORY:  Positive for dementia, hepatic adenocarcinoma, 
diabetes,

chronic kidney disease, CHF, GERD, hypothyroidism. 



PAST SURGICAL HISTORY:  None other than total knee replacement.



FAMILY HISTORY:  Negative for any acute strokes or seizures.



SOCIAL HISTORY:  Lives in an assisted living facility.  No alcohol, drugs, or

smoking. 



REVIEW OF SYSTEMS:  PULMONARY:  Negative for shortness of breath. 

CARDIAC:  Negative for any dizziness or palpitations or chest pain. 

ENDOCRINE:  Negative. 

OPHTHALMOLOGIC:  Negative for vision symptoms. 

GENITOURINARY:  Negative for any increased urination. 

NEUROLOGIC:  Positive for seizure-like events.



LABORATORY WORKUP:  White count 12.5, hemoglobin 9.6, hematocrit 31.1, platelet

count 299.  Chemistry; sodium 132, potassium 4.8, chloride 104, bicarb 19, BUN 
36,

creatinine 2.32, glucose is 198.  Liver functions within normal limits except

alkaline phosphatase 222, which was high.  Her urinalysis is slightly abnormal.

Urine tox screen showed random vancomycin level 17, and her blood gas results 
were

noted.  CT of the head did not show any acute intracranial hemorrhage and she 
did

have moderate white matter ischemic changes.  Her MRI is pending. 



PHYSICAL EXAMINATION:

VITAL SIGNS:  Temperature is 97.6, her blood pressure was 118/62, pulse rate of 
80. 

GENERAL APPEARANCE:  Well-built, well-nourished, slightly overweight lady, who 
is

pleasant. 

CHEST:  Clear vesicular breathing. 

CARDIOVASCULAR:  S1 and S2 heard.  No murmurs. 

ABDOMEN:  Soft. 

NEUROLOGIC:  Higher intellectual functions, please note the patient is somewhat

confused about time, rest normal.  Orientation to place and person.  Appropriate

conversation.  Cranial nerves normal.  Normal extraocular movements.  Normal

pupillary reaction.  Tongue, midline.  No atrophy noted.  Normal elevation of 
palate

and normal sensation of face bilaterally.  Motor; bulk normal, tone normal, 
strength

5/5 throughout, but slightly difficult to assess in the lower extremity.  She is

cooperative.  Her right arm is hurting and she was unable to lift her legs, 
which

were swollen.  Cerebellar, finger-to-nose is normal.  Deep tendon reflexes were

absent.  Sensory was normal. 



IMPRESSION AND RECOMMENDATIONS:  The patient is a 69-year-old lady with seizure-
like event.  On exam,

she did have mild tremor bilaterally in upper and lower extremities with 
action.  I

am suspecting that this tremor got worse when she became hypotensive and 
therefore

misinterpreted as seizure.  Her examination is relatively intact except for some

confusion, but she was able to follow commands.  At this time, we will wait for 
MRI

of the brain before making any further recommendations.  I do not think she 
needs to

be on antiepileptics at this time. 







Job ID:  [id number]



[facility] Nutrition Follow-Up/Recommendations:    Resume previous TF regimen as able post-procedure:   Nepro @ cyclic goal rate of 45mL/hr x20hrs/day d/t hold times for meds, Prosource Protein Liquid one packet BID, Refugio one packet BID.   Provides 1920 kcals, 108g protein, 1014mL water including water for prosource and Refugio administration.     Adjustments in additional free water flushes per critical care.